# Patient Record
Sex: FEMALE | Race: WHITE | NOT HISPANIC OR LATINO
[De-identification: names, ages, dates, MRNs, and addresses within clinical notes are randomized per-mention and may not be internally consistent; named-entity substitution may affect disease eponyms.]

---

## 2017-05-26 ENCOUNTER — NON-APPOINTMENT (OUTPATIENT)
Age: 37
End: 2017-05-26

## 2017-05-26 ENCOUNTER — APPOINTMENT (OUTPATIENT)
Dept: CARDIOLOGY | Facility: CLINIC | Age: 37
End: 2017-05-26

## 2017-05-26 VITALS
OXYGEN SATURATION: 97 % | DIASTOLIC BLOOD PRESSURE: 69 MMHG | HEIGHT: 63 IN | BODY MASS INDEX: 19.31 KG/M2 | WEIGHT: 109 LBS | HEART RATE: 72 BPM | SYSTOLIC BLOOD PRESSURE: 110 MMHG

## 2017-05-26 DIAGNOSIS — Z00.00 ENCOUNTER FOR GENERAL ADULT MEDICAL EXAMINATION W/OUT ABNORMAL FINDINGS: ICD-10-CM

## 2017-05-26 DIAGNOSIS — R00.2 PALPITATIONS: ICD-10-CM

## 2017-12-21 ENCOUNTER — NON-APPOINTMENT (OUTPATIENT)
Age: 37
End: 2017-12-21

## 2017-12-21 ENCOUNTER — APPOINTMENT (OUTPATIENT)
Dept: CARDIOLOGY | Facility: CLINIC | Age: 37
End: 2017-12-21
Payer: COMMERCIAL

## 2017-12-21 VITALS
BODY MASS INDEX: 18.43 KG/M2 | WEIGHT: 104 LBS | HEIGHT: 63 IN | OXYGEN SATURATION: 97 % | SYSTOLIC BLOOD PRESSURE: 108 MMHG | HEART RATE: 77 BPM | RESPIRATION RATE: 15 BRPM | DIASTOLIC BLOOD PRESSURE: 73 MMHG

## 2017-12-21 DIAGNOSIS — R07.9 CHEST PAIN, UNSPECIFIED: ICD-10-CM

## 2017-12-21 PROCEDURE — 93000 ELECTROCARDIOGRAM COMPLETE: CPT

## 2017-12-21 PROCEDURE — 99204 OFFICE O/P NEW MOD 45 MIN: CPT

## 2018-01-25 ENCOUNTER — APPOINTMENT (OUTPATIENT)
Dept: CARDIOLOGY | Facility: CLINIC | Age: 38
End: 2018-01-25
Payer: COMMERCIAL

## 2018-01-25 ENCOUNTER — NON-APPOINTMENT (OUTPATIENT)
Age: 38
End: 2018-01-25

## 2018-01-25 VITALS
DIASTOLIC BLOOD PRESSURE: 78 MMHG | BODY MASS INDEX: 18.78 KG/M2 | SYSTOLIC BLOOD PRESSURE: 111 MMHG | WEIGHT: 106 LBS | HEIGHT: 63 IN | HEART RATE: 80 BPM | OXYGEN SATURATION: 97 % | RESPIRATION RATE: 15 BRPM

## 2018-01-25 PROCEDURE — 93306 TTE W/DOPPLER COMPLETE: CPT

## 2018-01-25 PROCEDURE — 99213 OFFICE O/P EST LOW 20 MIN: CPT

## 2018-01-25 PROCEDURE — 93000 ELECTROCARDIOGRAM COMPLETE: CPT

## 2018-01-26 ENCOUNTER — APPOINTMENT (OUTPATIENT)
Dept: CARDIOLOGY | Facility: CLINIC | Age: 38
End: 2018-01-26

## 2018-04-06 ENCOUNTER — OUTPATIENT (OUTPATIENT)
Dept: OUTPATIENT SERVICES | Facility: HOSPITAL | Age: 38
LOS: 1 days | End: 2018-04-06
Payer: COMMERCIAL

## 2018-04-06 VITALS
TEMPERATURE: 99 F | SYSTOLIC BLOOD PRESSURE: 105 MMHG | HEART RATE: 83 BPM | HEIGHT: 63 IN | RESPIRATION RATE: 18 BRPM | DIASTOLIC BLOOD PRESSURE: 73 MMHG | WEIGHT: 104.94 LBS | OXYGEN SATURATION: 98 %

## 2018-04-06 DIAGNOSIS — M67.40 GANGLION, UNSPECIFIED SITE: ICD-10-CM

## 2018-04-06 DIAGNOSIS — Z01.818 ENCOUNTER FOR OTHER PREPROCEDURAL EXAMINATION: ICD-10-CM

## 2018-04-06 PROCEDURE — G0463: CPT

## 2018-04-06 RX ORDER — SODIUM CHLORIDE 9 MG/ML
3 INJECTION INTRAMUSCULAR; INTRAVENOUS; SUBCUTANEOUS EVERY 8 HOURS
Qty: 0 | Refills: 0 | Status: DISCONTINUED | OUTPATIENT
Start: 2018-04-13 | End: 2018-04-28

## 2018-04-06 RX ORDER — ACETAMINOPHEN 500 MG
1000 TABLET ORAL ONCE
Qty: 0 | Refills: 0 | Status: COMPLETED | OUTPATIENT
Start: 2018-04-13 | End: 2018-04-13

## 2018-04-06 RX ORDER — LIDOCAINE HCL 20 MG/ML
0.2 VIAL (ML) INJECTION ONCE
Qty: 0 | Refills: 0 | Status: DISCONTINUED | OUTPATIENT
Start: 2018-04-13 | End: 2018-04-28

## 2018-04-06 NOTE — H&P PST ADULT - NSANTHOSAYNRD_GEN_A_CORE
No. IVY screening performed.  STOP BANG Legend: 0-2 = LOW Risk; 3-4 = INTERMEDIATE Risk; 5-8 = HIGH Risk

## 2018-04-13 ENCOUNTER — OUTPATIENT (OUTPATIENT)
Dept: OUTPATIENT SERVICES | Facility: HOSPITAL | Age: 38
LOS: 1 days | End: 2018-04-13
Payer: COMMERCIAL

## 2018-04-13 ENCOUNTER — RESULT REVIEW (OUTPATIENT)
Age: 38
End: 2018-04-13

## 2018-04-13 VITALS
OXYGEN SATURATION: 99 % | TEMPERATURE: 98 F | HEART RATE: 78 BPM | RESPIRATION RATE: 16 BRPM | SYSTOLIC BLOOD PRESSURE: 108 MMHG | DIASTOLIC BLOOD PRESSURE: 58 MMHG

## 2018-04-13 VITALS
TEMPERATURE: 98 F | HEIGHT: 63 IN | WEIGHT: 104.94 LBS | RESPIRATION RATE: 20 BRPM | OXYGEN SATURATION: 100 % | HEART RATE: 865 BPM | DIASTOLIC BLOOD PRESSURE: 78 MMHG | SYSTOLIC BLOOD PRESSURE: 111 MMHG

## 2018-04-13 DIAGNOSIS — M67.40 GANGLION, UNSPECIFIED SITE: ICD-10-CM

## 2018-04-13 PROCEDURE — 25111 REMOVE WRIST TENDON LESION: CPT | Mod: RT

## 2018-04-13 PROCEDURE — 88304 TISSUE EXAM BY PATHOLOGIST: CPT

## 2018-04-13 PROCEDURE — 88304 TISSUE EXAM BY PATHOLOGIST: CPT | Mod: 26

## 2018-04-13 RX ORDER — ONDANSETRON 8 MG/1
4 TABLET, FILM COATED ORAL ONCE
Qty: 0 | Refills: 0 | Status: DISCONTINUED | OUTPATIENT
Start: 2018-04-13 | End: 2018-04-28

## 2018-04-13 RX ORDER — CELECOXIB 200 MG/1
200 CAPSULE ORAL ONCE
Qty: 0 | Refills: 0 | Status: COMPLETED | OUTPATIENT
Start: 2018-04-13 | End: 2018-04-13

## 2018-04-13 RX ORDER — FAMOTIDINE 10 MG/ML
0 INJECTION INTRAVENOUS
Qty: 0 | Refills: 0 | COMMUNITY

## 2018-04-13 RX ORDER — CELECOXIB 200 MG/1
200 CAPSULE ORAL ONCE
Qty: 0 | Refills: 0 | Status: DISCONTINUED | OUTPATIENT
Start: 2018-04-13 | End: 2018-04-28

## 2018-04-13 RX ORDER — SODIUM CHLORIDE 9 MG/ML
1000 INJECTION, SOLUTION INTRAVENOUS
Qty: 0 | Refills: 0 | Status: DISCONTINUED | OUTPATIENT
Start: 2018-04-13 | End: 2018-04-28

## 2018-04-13 RX ADMIN — Medication 1000 MILLIGRAM(S): at 14:03

## 2018-04-13 RX ADMIN — CELECOXIB 200 MILLIGRAM(S): 200 CAPSULE ORAL at 14:04

## 2018-04-13 NOTE — ASU DISCHARGE PLAN (ADULT/PEDIATRIC). - NURSING INSTRUCTIONS
Tylenol/Motrin as needed for pain, OK @ 8:00pm.  Keep right wrist elevated as much as possible.  Follow up as per MD request.

## 2018-04-13 NOTE — ASU DISCHARGE PLAN (ADULT/PEDIATRIC). - SPECIAL INSTRUCTIONS
You may take over-the-counter pain medications for post-operative pain.      Keep you arm elevated when not in use.  This will help reduce swelling.  You may apply cold-packs to the wrist as well for pain/swelling relief.

## 2019-02-09 ENCOUNTER — APPOINTMENT (OUTPATIENT)
Dept: ORTHOPEDIC SURGERY | Facility: CLINIC | Age: 39
End: 2019-02-09
Payer: COMMERCIAL

## 2019-02-09 VITALS
HEIGHT: 63 IN | WEIGHT: 106 LBS | SYSTOLIC BLOOD PRESSURE: 111 MMHG | BODY MASS INDEX: 18.78 KG/M2 | DIASTOLIC BLOOD PRESSURE: 77 MMHG | HEART RATE: 78 BPM

## 2019-02-09 DIAGNOSIS — M54.5 LOW BACK PAIN: ICD-10-CM

## 2019-02-09 DIAGNOSIS — M51.36 OTHER INTERVERTEBRAL DISC DEGENERATION, LUMBAR REGION: ICD-10-CM

## 2019-02-09 PROCEDURE — 99214 OFFICE O/P EST MOD 30 MIN: CPT

## 2019-02-09 PROCEDURE — 72170 X-RAY EXAM OF PELVIS: CPT | Mod: 59

## 2019-02-09 PROCEDURE — 72110 X-RAY EXAM L-2 SPINE 4/>VWS: CPT

## 2019-02-09 NOTE — HISTORY OF PRESENT ILLNESS
[Pain Location] : pain [] : right buttock [Lumbar] : lumbar region [___ wks] : [unfilled] week(s) ago [0] : a current pain level of 0/10 [Bending] : worsened by bending [Lifting] : worsened by lifting [Prolonged Sitting] : worsened by prolonged sitting [Prolonged Standing] : worsened by prolonged standing [None] : No exacerbating factors are noted [NSAIDs] : relieved by nonsteroidal anti-inflammatory drugs [Rest] : relieved by rest [de-identified] : Patient was lifting heavy items and stretch over to redecorate the back splash and had a sudden onset of low back pain with no radiculopathy to the lower extremities. Patient has some mild weakness on the right buttock and right leg.\par Denies any buckling.\par Patient lies down and rests for several days and takes Aleve to help decrease the pain.

## 2019-02-09 NOTE — DISCUSSION/SUMMARY
[Medication Risks Reviewed] : Medication risks reviewed [Surgical risks reviewed] : Surgical risks reviewed [de-identified] : Physical therapy prescription provided and patient is to return in 4-6 weeks\par If PT helps alleviate the back pain to continue, if not will discuss of MRI of the lumbar spine.

## 2019-02-09 NOTE — PHYSICAL EXAM
[Antalgic] : antalgic [UE/LE] : Sensory: Intact in bilateral upper & lower extremities [ALL] : dorsalis pedis, posterior tibial, femoral, popliteal, and radial 2+ and symmetric bilaterally [Normal RLE] : Right Lower Extremity: No scars, rashes, lesions, ulcers, skin intact [Normal LLE] : Left Lower Extremity: No scars, rashes, lesions, ulcers, skin intact [Normal DTR Reflexes] : DTR reflexes normal [Normal Touch] : sensation intact for touch [Normal] : No costovertebral angle tenderness and no spinal tenderness [Poor Appearance] : well-appearing [Acute Distress] : not in acute distress [Obese] : not obese [Poor Coordination] : normal coordination [de-identified] : Negative Fabers test [de-identified] : Straight leg raise of the right leg demonstrates low back pain.\par Normal toe and heel walk [de-identified] : No palpable tenderness of the spine [de-identified] : Xrays 4 views of the lumbar spine with good alignment on AP view, Lateral view demonstrating a decreased disc space at L5-S1 and vertebral spurring at L5. No fractures. No instability with flexion and extension views. \par AP view of the pelvis with well preserved joint spaces of bilateral hips.

## 2019-03-11 ENCOUNTER — APPOINTMENT (OUTPATIENT)
Dept: ORTHOPEDIC SURGERY | Facility: CLINIC | Age: 39
End: 2019-03-11

## 2019-03-20 ENCOUNTER — RESULT REVIEW (OUTPATIENT)
Age: 39
End: 2019-03-20

## 2019-10-27 ENCOUNTER — TRANSCRIPTION ENCOUNTER (OUTPATIENT)
Age: 39
End: 2019-10-27

## 2020-01-16 ENCOUNTER — TRANSCRIPTION ENCOUNTER (OUTPATIENT)
Age: 40
End: 2020-01-16

## 2020-01-20 ENCOUNTER — TRANSCRIPTION ENCOUNTER (OUTPATIENT)
Age: 40
End: 2020-01-20

## 2020-01-29 ENCOUNTER — APPOINTMENT (OUTPATIENT)
Dept: SURGERY | Facility: CLINIC | Age: 40
End: 2020-01-29
Payer: COMMERCIAL

## 2020-01-29 VITALS — BODY MASS INDEX: 18.61 KG/M2 | HEIGHT: 63 IN | WEIGHT: 105 LBS

## 2020-01-29 DIAGNOSIS — Z87.09 PERSONAL HISTORY OF OTHER DISEASES OF THE RESPIRATORY SYSTEM: ICD-10-CM

## 2020-01-29 PROCEDURE — 99203 OFFICE O/P NEW LOW 30 MIN: CPT

## 2020-01-30 NOTE — HISTORY OF PRESENT ILLNESS
[de-identified] : This 39 year old woman developed a painful anal mass four days ago. The mass is no longer painful and is slightly smaller. She has a past history of a similar problem which occurs several times a year. The patient denies any bleeding or passage of pus from the area.

## 2020-01-30 NOTE — PHYSICAL EXAM
[Normal Breath Sounds] : Normal breath sounds [Normal Heart Sounds] : normal heart sounds [Normal Rate and Rhythm] : normal rate and rhythm [Abdomen Tenderness] : ~T ~M No abdominal tenderness [Abdominal Masses] : No abdominal masses [No Rash or Lesion] : No rash or lesion [de-identified] : nl [de-identified] : nl [de-identified] : resolving thrombosed external hemorrhoid - anterior - 6 o'clock [de-identified] : nl

## 2020-01-30 NOTE — ASSESSMENT
[FreeTextEntry1] : Long discussion all options and risks\par Advised ice on the area to speed the healing process\par  I assured her that this was a self-limiting process that did not require any specific treatment\par  We discussed the possibility of undergoing a hemorrhoidectomy in the future as this has been a frequent occurrence.

## 2020-01-30 NOTE — REVIEW OF SYSTEMS
[Chest Pain] : no chest pain [Heart Rate Is Slow] : the heart rate was not slow [Shortness Of Breath] : no shortness of breath [Abdominal Pain] : no abdominal pain [Constipation] : no constipation [Negative] : Genitourinary

## 2020-12-18 ENCOUNTER — TRANSCRIPTION ENCOUNTER (OUTPATIENT)
Age: 40
End: 2020-12-18

## 2021-03-21 ENCOUNTER — TRANSCRIPTION ENCOUNTER (OUTPATIENT)
Age: 41
End: 2021-03-21

## 2021-03-29 ENCOUNTER — TRANSCRIPTION ENCOUNTER (OUTPATIENT)
Age: 41
End: 2021-03-29

## 2022-01-13 NOTE — H&P PST ADULT - NSANTHTOTALSCORECAL_ENT_A_CORE
[NL] : regular rate and rhythm, normal S1, S2 audible, no murmurs [Normotonic] : normotonic [de-identified] : moist mucus membranes [de-identified] : diffuse eczematous patches on b/l antecubital fossa and behind knees, dry skin throughout. 0

## 2022-10-28 ENCOUNTER — OUTPATIENT (OUTPATIENT)
Dept: OUTPATIENT SERVICES | Facility: HOSPITAL | Age: 42
LOS: 1 days | End: 2022-10-28
Payer: COMMERCIAL

## 2022-10-28 ENCOUNTER — APPOINTMENT (OUTPATIENT)
Dept: CT IMAGING | Facility: CLINIC | Age: 42
End: 2022-10-28

## 2022-10-28 DIAGNOSIS — Z00.8 ENCOUNTER FOR OTHER GENERAL EXAMINATION: ICD-10-CM

## 2022-10-28 PROCEDURE — 70486 CT MAXILLOFACIAL W/O DYE: CPT

## 2022-10-28 PROCEDURE — 70486 CT MAXILLOFACIAL W/O DYE: CPT | Mod: 26

## 2022-11-27 ENCOUNTER — NON-APPOINTMENT (OUTPATIENT)
Age: 42
End: 2022-11-27

## 2022-12-06 ENCOUNTER — NON-APPOINTMENT (OUTPATIENT)
Age: 42
End: 2022-12-06

## 2022-12-06 ENCOUNTER — APPOINTMENT (OUTPATIENT)
Dept: GASTROENTEROLOGY | Facility: CLINIC | Age: 42
End: 2022-12-06

## 2022-12-06 VITALS
SYSTOLIC BLOOD PRESSURE: 109 MMHG | RESPIRATION RATE: 15 BRPM | DIASTOLIC BLOOD PRESSURE: 75 MMHG | TEMPERATURE: 98.5 F | WEIGHT: 113 LBS | OXYGEN SATURATION: 98 % | BODY MASS INDEX: 20.02 KG/M2 | HEART RATE: 80 BPM | HEIGHT: 63 IN

## 2022-12-06 DIAGNOSIS — R12 HEARTBURN: ICD-10-CM

## 2022-12-06 DIAGNOSIS — K21.9 GASTRO-ESOPHAGEAL REFLUX DISEASE W/OUT ESOPHAGITIS: ICD-10-CM

## 2022-12-06 PROCEDURE — 99203 OFFICE O/P NEW LOW 30 MIN: CPT

## 2022-12-06 RX ORDER — OMEPRAZOLE 40 MG/1
CAPSULE, DELAYED RELEASE ORAL
Refills: 0 | Status: COMPLETED | COMMUNITY
End: 2022-12-06

## 2022-12-06 RX ORDER — CAMPHOR 0.45 %
GEL (GRAM) TOPICAL
Refills: 0 | Status: COMPLETED | COMMUNITY
End: 2022-12-06

## 2022-12-06 RX ORDER — SODIUM SULFATE, POTASSIUM SULFATE AND MAGNESIUM SULFATE 1.6; 3.13; 17.5 G/177ML; G/177ML; G/177ML
17.5-3.13-1.6 SOLUTION ORAL
Qty: 1 | Refills: 0 | Status: COMPLETED | COMMUNITY
Start: 2022-12-06 | End: 2022-12-07

## 2022-12-06 RX ORDER — VALACYCLOVIR 500 MG/1
500 TABLET, FILM COATED ORAL
Qty: 30 | Refills: 0 | Status: COMPLETED | COMMUNITY
Start: 2018-09-08 | End: 2022-12-06

## 2022-12-06 NOTE — PHYSICAL EXAM
[Alert] : alert [Normal Voice/Communication] : normal voice/communication [Healthy Appearing] : healthy appearing [No Acute Distress] : no acute distress [Sclera] : the sclera and conjunctiva were normal [Hearing Threshold Finger Rub Not [place]] : hearing was normal [Normal Lips/Gums] : the lips and gums were normal [Oropharynx] : the oropharynx was normal [Normal Appearance] : the appearance of the neck was normal [No Neck Mass] : no neck mass was observed [No Respiratory Distress] : no respiratory distress [No Acc Muscle Use] : no accessory muscle use [Respiration, Rhythm And Depth] : normal respiratory rhythm and effort [Auscultation Breath Sounds / Voice Sounds] : lungs were clear to auscultation bilaterally [Heart Rate And Rhythm] : heart rate was normal and rhythm regular [Normal S1, S2] : normal S1 and S2 [Murmurs] : no murmurs [Bowel Sounds] : normal bowel sounds [Abdomen Tenderness] : non-tender [No Masses] : no abdominal mass palpated [Abdomen Soft] : soft [Oriented To Time, Place, And Person] : oriented to person, place, and time [No CVA Tenderness] : no CVA  tenderness [Involuntary Movements] : no involuntary movements were seen [Normal Color / Pigmentation] : normal skin color and pigmentation [] : no rash [Cranial Nerves Intact] : cranial nerves 2-12 were intact

## 2022-12-06 NOTE — REVIEW OF SYSTEMS
[As Noted in HPI] : as noted in HPI [Negative] : Heme/Lymph [FreeTextEntry7] : rectal bleeding/melena per HPI

## 2022-12-06 NOTE — ASSESSMENT
[FreeTextEntry1] : Check CBC to r/o anemia.\par \par Get colonoscopy.\par Explained the risks, benefits, indications, alternatives. The risks include but are not limited to bleeding, infection, perforation, reaction to anesthesia, or missed lesions. The patient verbalized understanding and wishes to proceed.\par \par Follow up after testing. She agrees.\par

## 2022-12-06 NOTE — HISTORY OF PRESENT ILLNESS
[FreeTextEntry1] : Rectal bleeding with urgency starting 9 days ago.\par Denies change in stools.\par Denies pain with BM at present.\par Denies weight loss or loss of appetite.\par Has had previous episodes attributed to hemorrhoids.\par Denies previous anoscopy, sigmoidoscopy, colonoscopy.\par \par No PMD or labs since before pandemic, per patient.

## 2022-12-07 ENCOUNTER — NON-APPOINTMENT (OUTPATIENT)
Age: 42
End: 2022-12-07

## 2022-12-07 LAB
BASOPHILS # BLD AUTO: 0.05 K/UL
BASOPHILS NFR BLD AUTO: 0.6 %
EOSINOPHIL # BLD AUTO: 0.12 K/UL
EOSINOPHIL NFR BLD AUTO: 1.5 %
HCT VFR BLD CALC: 34.6 %
HGB BLD-MCNC: 10.5 G/DL
IMM GRANULOCYTES NFR BLD AUTO: 0.4 %
LYMPHOCYTES # BLD AUTO: 1.69 K/UL
LYMPHOCYTES NFR BLD AUTO: 21.6 %
MAN DIFF?: NORMAL
MCHC RBC-ENTMCNC: 24.3 PG
MCHC RBC-ENTMCNC: 30.3 GM/DL
MCV RBC AUTO: 80.1 FL
MONOCYTES # BLD AUTO: 0.54 K/UL
MONOCYTES NFR BLD AUTO: 6.9 %
NEUTROPHILS # BLD AUTO: 5.39 K/UL
NEUTROPHILS NFR BLD AUTO: 69 %
PLATELET # BLD AUTO: 369 K/UL
RBC # BLD: 4.32 M/UL
RBC # FLD: 15.3 %
WBC # FLD AUTO: 7.82 K/UL

## 2023-02-02 ENCOUNTER — APPOINTMENT (OUTPATIENT)
Dept: GASTROENTEROLOGY | Facility: HOSPITAL | Age: 43
End: 2023-02-02

## 2023-03-01 ENCOUNTER — NON-APPOINTMENT (OUTPATIENT)
Age: 43
End: 2023-03-01

## 2023-03-02 ENCOUNTER — EMERGENCY (EMERGENCY)
Facility: HOSPITAL | Age: 43
LOS: 1 days | Discharge: HOME | End: 2023-03-02
Attending: EMERGENCY MEDICINE | Admitting: EMERGENCY MEDICINE
Payer: COMMERCIAL

## 2023-03-02 VITALS
SYSTOLIC BLOOD PRESSURE: 134 MMHG | TEMPERATURE: 98 F | OXYGEN SATURATION: 94 % | HEART RATE: 97 BPM | HEIGHT: 63 IN | WEIGHT: 104.94 LBS | DIASTOLIC BLOOD PRESSURE: 63 MMHG | RESPIRATION RATE: 25 BRPM

## 2023-03-02 VITALS
RESPIRATION RATE: 18 BRPM | DIASTOLIC BLOOD PRESSURE: 67 MMHG | OXYGEN SATURATION: 96 % | SYSTOLIC BLOOD PRESSURE: 100 MMHG | HEART RATE: 112 BPM | TEMPERATURE: 100 F

## 2023-03-02 LAB
ALBUMIN SERPL ELPH-MCNC: 3.4 G/DL — SIGNIFICANT CHANGE UP (ref 3.3–5)
ALP SERPL-CCNC: 96 U/L — SIGNIFICANT CHANGE UP (ref 40–120)
ALT FLD-CCNC: 14 U/L — SIGNIFICANT CHANGE UP (ref 10–45)
ANION GAP SERPL CALC-SCNC: 8 MMOL/L — SIGNIFICANT CHANGE UP (ref 5–17)
APPEARANCE UR: CLEAR — SIGNIFICANT CHANGE UP
AST SERPL-CCNC: 15 U/L — SIGNIFICANT CHANGE UP (ref 10–40)
BACTERIA # UR AUTO: ABNORMAL /HPF
BASOPHILS # BLD AUTO: 0.01 K/UL — SIGNIFICANT CHANGE UP (ref 0–0.2)
BASOPHILS NFR BLD AUTO: 0.1 % — SIGNIFICANT CHANGE UP (ref 0–2)
BILIRUB SERPL-MCNC: 0.6 MG/DL — SIGNIFICANT CHANGE UP (ref 0.2–1.2)
BILIRUB UR-MCNC: NEGATIVE — SIGNIFICANT CHANGE UP
BUN SERPL-MCNC: 9 MG/DL — SIGNIFICANT CHANGE UP (ref 7–23)
CALCIUM SERPL-MCNC: 8.9 MG/DL — SIGNIFICANT CHANGE UP (ref 8.4–10.5)
CHLORIDE SERPL-SCNC: 100 MMOL/L — SIGNIFICANT CHANGE UP (ref 96–108)
CO2 SERPL-SCNC: 27 MMOL/L — SIGNIFICANT CHANGE UP (ref 22–31)
COLOR SPEC: YELLOW — SIGNIFICANT CHANGE UP
CREAT SERPL-MCNC: 0.59 MG/DL — SIGNIFICANT CHANGE UP (ref 0.5–1.3)
DIFF PNL FLD: ABNORMAL
EGFR: 116 ML/MIN/1.73M2 — SIGNIFICANT CHANGE UP
EOSINOPHIL # BLD AUTO: 0.01 K/UL — SIGNIFICANT CHANGE UP (ref 0–0.5)
EOSINOPHIL NFR BLD AUTO: 0.1 % — SIGNIFICANT CHANGE UP (ref 0–6)
EPI CELLS # UR: SIGNIFICANT CHANGE UP
GLUCOSE SERPL-MCNC: 111 MG/DL — HIGH (ref 70–99)
GLUCOSE UR QL: NEGATIVE — SIGNIFICANT CHANGE UP
HCG SERPL-ACNC: <1 MIU/ML — SIGNIFICANT CHANGE UP
HCT VFR BLD CALC: 33.1 % — LOW (ref 34.5–45)
HGB BLD-MCNC: 10.3 G/DL — LOW (ref 11.5–15.5)
IMM GRANULOCYTES NFR BLD AUTO: 0.2 % — SIGNIFICANT CHANGE UP (ref 0–0.9)
KETONES UR-MCNC: ABNORMAL
LEUKOCYTE ESTERASE UR-ACNC: NEGATIVE — SIGNIFICANT CHANGE UP
LYMPHOCYTES # BLD AUTO: 0.42 K/UL — LOW (ref 1–3.3)
LYMPHOCYTES # BLD AUTO: 5.2 % — LOW (ref 13–44)
MCHC RBC-ENTMCNC: 23.4 PG — LOW (ref 27–34)
MCHC RBC-ENTMCNC: 31.1 GM/DL — LOW (ref 32–36)
MCV RBC AUTO: 75.2 FL — LOW (ref 80–100)
MONOCYTES # BLD AUTO: 0.07 K/UL — SIGNIFICANT CHANGE UP (ref 0–0.9)
MONOCYTES NFR BLD AUTO: 0.9 % — LOW (ref 2–14)
NEUTROPHILS # BLD AUTO: 7.48 K/UL — HIGH (ref 1.8–7.4)
NEUTROPHILS NFR BLD AUTO: 93.5 % — HIGH (ref 43–77)
NITRITE UR-MCNC: NEGATIVE — SIGNIFICANT CHANGE UP
NRBC # BLD: 0 /100 WBCS — SIGNIFICANT CHANGE UP (ref 0–0)
PH UR: 7 — SIGNIFICANT CHANGE UP (ref 5–8)
PLATELET # BLD AUTO: 312 K/UL — SIGNIFICANT CHANGE UP (ref 150–400)
POTASSIUM SERPL-MCNC: 3.8 MMOL/L — SIGNIFICANT CHANGE UP (ref 3.5–5.3)
POTASSIUM SERPL-SCNC: 3.8 MMOL/L — SIGNIFICANT CHANGE UP (ref 3.5–5.3)
PROT SERPL-MCNC: 6.8 G/DL — SIGNIFICANT CHANGE UP (ref 6–8.3)
PROT UR-MCNC: 15
RBC # BLD: 4.4 M/UL — SIGNIFICANT CHANGE UP (ref 3.8–5.2)
RBC # FLD: 15.2 % — HIGH (ref 10.3–14.5)
RBC CASTS # UR COMP ASSIST: SIGNIFICANT CHANGE UP /HPF (ref 0–4)
SODIUM SERPL-SCNC: 135 MMOL/L — SIGNIFICANT CHANGE UP (ref 135–145)
SP GR SPEC: 1 — LOW (ref 1.01–1.02)
UROBILINOGEN FLD QL: NEGATIVE — SIGNIFICANT CHANGE UP
WBC # BLD: 8.01 K/UL — SIGNIFICANT CHANGE UP (ref 3.8–10.5)
WBC # FLD AUTO: 8.01 K/UL — SIGNIFICANT CHANGE UP (ref 3.8–10.5)
WBC UR QL: NEGATIVE /HPF — SIGNIFICANT CHANGE UP (ref 0–5)

## 2023-03-02 PROCEDURE — 74177 CT ABD & PELVIS W/CONTRAST: CPT | Mod: MA

## 2023-03-02 PROCEDURE — 85025 COMPLETE CBC W/AUTO DIFF WBC: CPT

## 2023-03-02 PROCEDURE — 36415 COLL VENOUS BLD VENIPUNCTURE: CPT

## 2023-03-02 PROCEDURE — 76830 TRANSVAGINAL US NON-OB: CPT

## 2023-03-02 PROCEDURE — 96361 HYDRATE IV INFUSION ADD-ON: CPT

## 2023-03-02 PROCEDURE — 74177 CT ABD & PELVIS W/CONTRAST: CPT | Mod: 26,MA

## 2023-03-02 PROCEDURE — 99284 EMERGENCY DEPT VISIT MOD MDM: CPT | Mod: 25

## 2023-03-02 PROCEDURE — 99285 EMERGENCY DEPT VISIT HI MDM: CPT

## 2023-03-02 PROCEDURE — 76830 TRANSVAGINAL US NON-OB: CPT | Mod: 26

## 2023-03-02 PROCEDURE — 0225U NFCT DS DNA&RNA 21 SARSCOV2: CPT

## 2023-03-02 PROCEDURE — 84702 CHORIONIC GONADOTROPIN TEST: CPT

## 2023-03-02 PROCEDURE — 71250 CT THORAX DX C-: CPT | Mod: 26,MA

## 2023-03-02 PROCEDURE — 71250 CT THORAX DX C-: CPT | Mod: MA

## 2023-03-02 PROCEDURE — 80053 COMPREHEN METABOLIC PANEL: CPT

## 2023-03-02 PROCEDURE — 96374 THER/PROPH/DIAG INJ IV PUSH: CPT | Mod: XU

## 2023-03-02 PROCEDURE — 81001 URINALYSIS AUTO W/SCOPE: CPT

## 2023-03-02 RX ORDER — OXYCODONE AND ACETAMINOPHEN 5; 325 MG/1; MG/1
1 TABLET ORAL
Qty: 12 | Refills: 0
Start: 2023-03-02 | End: 2023-03-04

## 2023-03-02 RX ORDER — METHOCARBAMOL 500 MG/1
500 TABLET, FILM COATED ORAL ONCE
Refills: 0 | Status: COMPLETED | OUTPATIENT
Start: 2023-03-02 | End: 2023-03-02

## 2023-03-02 RX ORDER — SODIUM CHLORIDE 9 MG/ML
1000 INJECTION INTRAMUSCULAR; INTRAVENOUS; SUBCUTANEOUS ONCE
Refills: 0 | Status: COMPLETED | OUTPATIENT
Start: 2023-03-02 | End: 2023-03-02

## 2023-03-02 RX ORDER — KETOROLAC TROMETHAMINE 30 MG/ML
15 SYRINGE (ML) INJECTION ONCE
Refills: 0 | Status: DISCONTINUED | OUTPATIENT
Start: 2023-03-02 | End: 2023-03-02

## 2023-03-02 RX ORDER — LIDOCAINE 4 G/100G
1 CREAM TOPICAL ONCE
Refills: 0 | Status: COMPLETED | OUTPATIENT
Start: 2023-03-02 | End: 2023-03-02

## 2023-03-02 RX ORDER — METHOCARBAMOL 500 MG/1
1 TABLET, FILM COATED ORAL
Qty: 15 | Refills: 0
Start: 2023-03-02 | End: 2023-03-06

## 2023-03-02 RX ORDER — ACETAMINOPHEN 500 MG
650 TABLET ORAL ONCE
Refills: 0 | Status: COMPLETED | OUTPATIENT
Start: 2023-03-02 | End: 2023-03-02

## 2023-03-02 RX ORDER — IBUPROFEN 200 MG
1 TABLET ORAL
Qty: 28 | Refills: 0
Start: 2023-03-02 | End: 2023-03-08

## 2023-03-02 RX ADMIN — LIDOCAINE 1 PATCH: 4 CREAM TOPICAL at 13:40

## 2023-03-02 RX ADMIN — Medication 15 MILLIGRAM(S): at 13:42

## 2023-03-02 RX ADMIN — METHOCARBAMOL 500 MILLIGRAM(S): 500 TABLET, FILM COATED ORAL at 13:43

## 2023-03-02 RX ADMIN — SODIUM CHLORIDE 1000 MILLILITER(S): 9 INJECTION INTRAMUSCULAR; INTRAVENOUS; SUBCUTANEOUS at 14:42

## 2023-03-02 RX ADMIN — Medication 650 MILLIGRAM(S): at 20:22

## 2023-03-02 RX ADMIN — SODIUM CHLORIDE 2000 MILLILITER(S): 9 INJECTION INTRAMUSCULAR; INTRAVENOUS; SUBCUTANEOUS at 13:42

## 2023-03-02 RX ADMIN — Medication 15 MILLIGRAM(S): at 13:57

## 2023-03-02 NOTE — ED PROVIDER NOTE - PHYSICAL EXAMINATION
Gen: Well appearing in NAD.  AAOx3  Head: atraumatic  Abd: soft, +mild TTP over LLQ, no rebound or guarding, NCVAT  Msk: pelvis stable.  +TTP over the left inguinal region. limited ROM of left hip 2/2 pain.  Pain to the left inguinal region with SLR. No bruising, hematoma or rash noted. 2+ pedal pulses. No neurovasc compromise. compartments soft   Neuro: patient moving all extremity equally, no focal neuro deficits noted  Skin: Normal for race. No rashes or bruising   Psych: Alert and oriented

## 2023-03-02 NOTE — ED PROVIDER NOTE - CLINICAL SUMMARY MEDICAL DECISION MAKING FREE TEXT BOX
42-year-old female with past medical history of acid reflux was sent to the ED by urgent care for left hip pain X 1-1/2 to 2 months worsened today.  Patient reports she was on a ski trip about a month ago she thinks she aggravated the injury in her left hip.  She was doing stretching exercises today and felt the spasm in her hip got worse so she went to urgent care.  Patient reports that she was having chills from the pain in urgent care left eye they sent her in.  She denies fever, direct trauma, numbness or tingling down the leg, urinary or bowel incontinence continence, abdominal pain, nausea, vomiting or urinary symptoms. PE as noted above. labs/UA/CT pending. pain control, reassess.  Pt likely has psoas muscle injury. 42-year-old female with past medical history of acid reflux was sent to the ED by urgent care for left hip pain X 1-1/2 to 2 months worsened today.  Patient reports she was on a ski trip about a month ago she thinks she aggravated the injury in her left hip.  She was doing stretching exercises today and felt the spasm in her hip got worse so she went to urgent care.  Patient reports that she was having chills from the pain in urgent care left eye they sent her in.  She denies fever, direct trauma, numbness or tingling down the leg, urinary or bowel incontinence continence, abdominal pain, nausea, vomiting or urinary symptoms. PE as noted above. labs/UA/CT pending. pain control, reassess.  Pt likely has psoas muscle injury.    732pm: labs reviewed. UA reviewed. US/Abd CT and chest CT results reviewed. I spoke with Dr. Donaldson from [facility], he will see patient on Monday morning, he plans patient to call the office tomorrow for an appointment time slot. I discussed with patient and her mother at bedside. They verbalized understanding of the plan

## 2023-03-02 NOTE — ED PROVIDER NOTE - OBJECTIVE STATEMENT
42-year-old female with past medical history of acid reflux was sent to the ED by urgent care for left hip pain X 1-1/2 to 2 months worsened today.  Patient reports she was on a ski trip about a month ago she thinks she aggravated the injury in her left hip. She was doing stretching exercises today and felt the spasm in her hip got worse so she went to urgent care.  Patient reports that she was having chills from the pain in urgent care left eye they sent her in.  She denies fever, direct trauma, numbness or tingling down the leg, urinary or bowel incontinence continence, abdominal pain, nausea, vomiting or urinary symptoms

## 2023-03-02 NOTE — ED ADULT TRIAGE NOTE - CHIEF COMPLAINT QUOTE
c/o left hip pain with spasms since this morning. pt reports she went skiing a few days ago and pain has worsened since injury in jan. Reports she took an aspirin at 10 am.

## 2023-03-02 NOTE — ED PROVIDER NOTE - PATIENT PORTAL LINK FT
You can access the FollowMyHealth Patient Portal offered by [facility] by registering at the following website: [email or web address]. By joining [employer]’s FollowMyHealth portal, you will also be able to view your health information using other applications (apps) compatible with our system.

## 2023-03-02 NOTE — ED PROVIDER NOTE - NSFOLLOWUPINSTRUCTIONS_ED_ALL_ED_FT
Follow up with The gynecologist Dr. Donaldson on Monday.  Call his office tomorrow morning for an appointment time slot for Monday    Take the prescribed medications as directed for pain     Stay hydrated    Return to the ER if your symptoms worsen or for any other medical emergencies   *************

## 2023-03-02 NOTE — ED ADULT NURSE NOTE - OBJECTIVE STATEMENT
pt c/o L-groin pain rated 10/10 worsening since injury while skiing 1month ago. reports she was stretching this morning and got worse.

## 2023-03-02 NOTE — ED ADULT NURSE NOTE - NS PRO AD BILL OF RIGHTS
I can refer him back to dietician but he can't read and has poor focus - so if he doesn't remember what was said - he can't read handouts later. He really needs someone to shop with him and instructions in photos - but he has even had trouble following photos on TV dinners. It is rather frustrating. Germán Maciel was not hopeful for him the last time she saw him. I discussed with him at last visit - moving into assisted living and having them make food for him and he won't. Ren Baldwin was doing a really good job keeping him on track and shopping with him but now that he is out of state, patient is worsening. He is not taking medication from [facility] and I sent him back to [facility] to get meds restarted to help him sleep, focus, and treat irritability. Carin - order referral to karely Muniz.
Yes

## 2023-03-02 NOTE — ED PROVIDER NOTE - CARE PROVIDER_API CALL
Oscar Donaldson)  Obstetrics and Gynecology  [address], [address]  [place] [address]  Phone: [phone]  Fax: [phone]  Follow Up Time:

## 2023-03-02 NOTE — ED ADULT NURSE NOTE - NSICDXPASTSURGICALHX_GEN_ALL_CORE_FT
Bed rails are up and call light is within patient reach.   PAST SURGICAL HISTORY:  No significant past surgical history

## 2023-03-02 NOTE — ED PROVIDER NOTE - ATTENDING APP SHARED VISIT CONTRIBUTION OF CARE
Dylan with [facility]. 42-year-old female with past medical history of acid reflux was sent to the ED by urgent care for left hip pain X 1-1/2 to 2 months worsened today.  Patient reports she was on a ski trip about a month ago she thinks she aggravated the injury in her left hip.  She was doing stretching exercises today and felt the spasm in her hip got worse so she went to urgent care.  Patient reports that she was having chills from the pain in urgent care left eye they sent her in.  She denies fever, direct trauma, numbness or tingling down the leg, urinary or bowel incontinence continence, abdominal pain, nausea, vomiting or urinary symptoms. PE as noted above. labs/UA/CT pending. pain control, reassess.  Pt likely has psoas muscle injury. Will reassess      I performed a face to face bedside interview with patient regarding history of present illness, review of symptoms and past medical history. I completed an independent physical exam.  I have discussed the patient's plan of care with Physician Assistant (PA). I agree with note as stated above, having amended the EMR as needed to reflect my findings.   This includes History of Present Illness, HIV, Past Medical/Surgical/Family/Social History, Allergies and Home Medications, Review of Systems, Physical Exam, and any Progress Notes during the time I functioned as the attending physician for this patient.

## 2023-03-03 ENCOUNTER — INPATIENT (INPATIENT)
Facility: HOSPITAL | Age: 43
LOS: 5 days | Discharge: HOME HEALTH | DRG: 872 | End: 2023-03-09
Attending: OBSTETRICS & GYNECOLOGY | Admitting: OBSTETRICS & GYNECOLOGY
Payer: COMMERCIAL

## 2023-03-03 VITALS
DIASTOLIC BLOOD PRESSURE: 67 MMHG | WEIGHT: 100.09 LBS | TEMPERATURE: 98 F | OXYGEN SATURATION: 98 % | RESPIRATION RATE: 18 BRPM | SYSTOLIC BLOOD PRESSURE: 100 MMHG | HEART RATE: 124 BPM | HEIGHT: 63 IN

## 2023-03-03 DIAGNOSIS — N83.209 UNSPECIFIED OVARIAN CYST, UNSPECIFIED SIDE: ICD-10-CM

## 2023-03-03 LAB
ALBUMIN SERPL ELPH-MCNC: 3 G/DL — LOW (ref 3.3–5)
ALBUMIN SERPL ELPH-MCNC: 3.6 G/DL — SIGNIFICANT CHANGE UP (ref 3.3–5)
ALP SERPL-CCNC: 80 U/L — SIGNIFICANT CHANGE UP (ref 40–120)
ALP SERPL-CCNC: 84 U/L — SIGNIFICANT CHANGE UP (ref 40–120)
ALT FLD-CCNC: 11 U/L — SIGNIFICANT CHANGE UP (ref 10–45)
ALT FLD-CCNC: 11 U/L — SIGNIFICANT CHANGE UP (ref 10–45)
ANION GAP SERPL CALC-SCNC: 11 MMOL/L — SIGNIFICANT CHANGE UP (ref 5–17)
ANION GAP SERPL CALC-SCNC: 13 MMOL/L — SIGNIFICANT CHANGE UP (ref 5–17)
APTT BLD: 33.8 SEC — SIGNIFICANT CHANGE UP (ref 27.5–35.5)
APTT BLD: 33.9 SEC — SIGNIFICANT CHANGE UP (ref 27.5–35.5)
AST SERPL-CCNC: 10 U/L — SIGNIFICANT CHANGE UP (ref 10–40)
AST SERPL-CCNC: 10 U/L — SIGNIFICANT CHANGE UP (ref 10–40)
BASE EXCESS BLDV CALC-SCNC: -0.7 MMOL/L — SIGNIFICANT CHANGE UP (ref -2–3)
BASOPHILS # BLD AUTO: 0.42 K/UL — HIGH (ref 0–0.2)
BASOPHILS NFR BLD AUTO: 2.4 % — HIGH (ref 0–2)
BILIRUB SERPL-MCNC: 0.5 MG/DL — SIGNIFICANT CHANGE UP (ref 0.2–1.2)
BILIRUB SERPL-MCNC: 0.7 MG/DL — SIGNIFICANT CHANGE UP (ref 0.2–1.2)
BLD GP AB SCN SERPL QL: NEGATIVE — SIGNIFICANT CHANGE UP
BUN SERPL-MCNC: 10 MG/DL — SIGNIFICANT CHANGE UP (ref 7–23)
BUN SERPL-MCNC: 13 MG/DL — SIGNIFICANT CHANGE UP (ref 7–23)
CA-I SERPL-SCNC: 1.2 MMOL/L — SIGNIFICANT CHANGE UP (ref 1.15–1.33)
CALCIUM SERPL-MCNC: 8.4 MG/DL — SIGNIFICANT CHANGE UP (ref 8.4–10.5)
CALCIUM SERPL-MCNC: 9 MG/DL — SIGNIFICANT CHANGE UP (ref 8.4–10.5)
CHLORIDE BLDV-SCNC: 100 MMOL/L — SIGNIFICANT CHANGE UP (ref 96–108)
CHLORIDE SERPL-SCNC: 106 MMOL/L — SIGNIFICANT CHANGE UP (ref 96–108)
CHLORIDE SERPL-SCNC: 98 MMOL/L — SIGNIFICANT CHANGE UP (ref 96–108)
CO2 BLDV-SCNC: 25 MMOL/L — SIGNIFICANT CHANGE UP (ref 22–26)
CO2 SERPL-SCNC: 20 MMOL/L — LOW (ref 22–31)
CO2 SERPL-SCNC: 23 MMOL/L — SIGNIFICANT CHANGE UP (ref 22–31)
CREAT SERPL-MCNC: 0.51 MG/DL — SIGNIFICANT CHANGE UP (ref 0.5–1.3)
CREAT SERPL-MCNC: 0.63 MG/DL — SIGNIFICANT CHANGE UP (ref 0.5–1.3)
EGFR: 114 ML/MIN/1.73M2 — SIGNIFICANT CHANGE UP
EGFR: 120 ML/MIN/1.73M2 — SIGNIFICANT CHANGE UP
ELLIPTOCYTES BLD QL SMEAR: SIGNIFICANT CHANGE UP
EOSINOPHIL # BLD AUTO: 0 K/UL — SIGNIFICANT CHANGE UP (ref 0–0.5)
EOSINOPHIL NFR BLD AUTO: 0 % — SIGNIFICANT CHANGE UP (ref 0–6)
GAS PNL BLDV: 132 MMOL/L — LOW (ref 136–145)
GAS PNL BLDV: SIGNIFICANT CHANGE UP
GAS PNL BLDV: SIGNIFICANT CHANGE UP
GLUCOSE BLDV-MCNC: 114 MG/DL — HIGH (ref 70–99)
GLUCOSE SERPL-MCNC: 109 MG/DL — HIGH (ref 70–99)
GLUCOSE SERPL-MCNC: 121 MG/DL — HIGH (ref 70–99)
HCG SERPL-ACNC: <2 MIU/ML — SIGNIFICANT CHANGE UP
HCO3 BLDV-SCNC: 24 MMOL/L — SIGNIFICANT CHANGE UP (ref 22–29)
HCT VFR BLD CALC: 27.5 % — LOW (ref 34.5–45)
HCT VFR BLD CALC: 29.7 % — LOW (ref 34.5–45)
HCT VFR BLDA CALC: 27 % — LOW (ref 34.5–46.5)
HGB BLD CALC-MCNC: 9.1 G/DL — LOW (ref 11.7–16.1)
HGB BLD-MCNC: 8.6 G/DL — LOW (ref 11.5–15.5)
HGB BLD-MCNC: 9.2 G/DL — LOW (ref 11.5–15.5)
HIV 1 & 2 AB SERPL IA.RAPID: SIGNIFICANT CHANGE UP
INR BLD: 2.07 RATIO — HIGH (ref 0.88–1.16)
INR BLD: 2.29 RATIO — HIGH (ref 0.88–1.16)
LACTATE BLDV-MCNC: 1.6 MMOL/L — SIGNIFICANT CHANGE UP (ref 0.5–2)
LIDOCAIN IGE QN: 13 U/L — SIGNIFICANT CHANGE UP (ref 7–60)
LYMPHOCYTES # BLD AUTO: 30.9 % — SIGNIFICANT CHANGE UP (ref 13–44)
LYMPHOCYTES # BLD AUTO: 5.43 K/UL — HIGH (ref 1–3.3)
MAGNESIUM SERPL-MCNC: 1.7 MG/DL — SIGNIFICANT CHANGE UP (ref 1.6–2.6)
MANUAL SMEAR VERIFICATION: SIGNIFICANT CHANGE UP
MCHC RBC-ENTMCNC: 23.1 PG — LOW (ref 27–34)
MCHC RBC-ENTMCNC: 23.2 PG — LOW (ref 27–34)
MCHC RBC-ENTMCNC: 31 GM/DL — LOW (ref 32–36)
MCHC RBC-ENTMCNC: 31.3 GM/DL — LOW (ref 32–36)
MCV RBC AUTO: 74.3 FL — LOW (ref 80–100)
MCV RBC AUTO: 74.4 FL — LOW (ref 80–100)
METAMYELOCYTES # FLD: 2.4 % — HIGH (ref 0–0)
MONOCYTES # BLD AUTO: 0.84 K/UL — SIGNIFICANT CHANGE UP (ref 0–0.9)
MONOCYTES NFR BLD AUTO: 4.8 % — SIGNIFICANT CHANGE UP (ref 2–14)
NEUTROPHILS # BLD AUTO: 10.46 K/UL — HIGH (ref 1.8–7.4)
NEUTROPHILS NFR BLD AUTO: 50 % — SIGNIFICANT CHANGE UP (ref 43–77)
NEUTS BAND # BLD: 9.5 % — HIGH (ref 0–8)
NRBC # BLD: 0 /100 WBCS — SIGNIFICANT CHANGE UP (ref 0–0)
PCO2 BLDV: 40 MMHG — SIGNIFICANT CHANGE UP (ref 39–42)
PH BLDV: 7.39 — SIGNIFICANT CHANGE UP (ref 7.32–7.43)
PHOSPHATE SERPL-MCNC: 1.5 MG/DL — LOW (ref 2.5–4.5)
PLAT MORPH BLD: NORMAL — SIGNIFICANT CHANGE UP
PLATELET # BLD AUTO: 260 K/UL — SIGNIFICANT CHANGE UP (ref 150–400)
PLATELET # BLD AUTO: 317 K/UL — SIGNIFICANT CHANGE UP (ref 150–400)
PO2 BLDV: 49 MMHG — HIGH (ref 25–45)
POIKILOCYTOSIS BLD QL AUTO: SIGNIFICANT CHANGE UP
POTASSIUM BLDV-SCNC: 3.3 MMOL/L — LOW (ref 3.5–5.1)
POTASSIUM SERPL-MCNC: 3.4 MMOL/L — LOW (ref 3.5–5.3)
POTASSIUM SERPL-MCNC: 3.5 MMOL/L — SIGNIFICANT CHANGE UP (ref 3.5–5.3)
POTASSIUM SERPL-SCNC: 3.4 MMOL/L — LOW (ref 3.5–5.3)
POTASSIUM SERPL-SCNC: 3.5 MMOL/L — SIGNIFICANT CHANGE UP (ref 3.5–5.3)
PROCALCITONIN SERPL-MCNC: 16.61 NG/ML — HIGH (ref 0.02–0.1)
PROT SERPL-MCNC: 6.1 G/DL — SIGNIFICANT CHANGE UP (ref 6–8.3)
PROT SERPL-MCNC: 6.6 G/DL — SIGNIFICANT CHANGE UP (ref 6–8.3)
PROTHROM AB SERPL-ACNC: 24.2 SEC — HIGH (ref 10.5–13.4)
PROTHROM AB SERPL-ACNC: 26.8 SEC — HIGH (ref 10.5–13.4)
RAPID RVP RESULT: SIGNIFICANT CHANGE UP
RBC # BLD: 3.7 M/UL — LOW (ref 3.8–5.2)
RBC # BLD: 3.99 M/UL — SIGNIFICANT CHANGE UP (ref 3.8–5.2)
RBC # FLD: 15.8 % — HIGH (ref 10.3–14.5)
RBC # FLD: 15.8 % — HIGH (ref 10.3–14.5)
RBC BLD AUTO: ABNORMAL
RH IG SCN BLD-IMP: POSITIVE — SIGNIFICANT CHANGE UP
SAO2 % BLDV: 81.4 % — SIGNIFICANT CHANGE UP (ref 67–88)
SARS-COV-2 RNA SPEC QL NAA+PROBE: SIGNIFICANT CHANGE UP
SODIUM SERPL-SCNC: 134 MMOL/L — LOW (ref 135–145)
SODIUM SERPL-SCNC: 137 MMOL/L — SIGNIFICANT CHANGE UP (ref 135–145)
T PALLIDUM AB TITR SER: NEGATIVE — SIGNIFICANT CHANGE UP
WBC # BLD: 14.94 K/UL — HIGH (ref 3.8–10.5)
WBC # BLD: 17.58 K/UL — HIGH (ref 3.8–10.5)
WBC # FLD AUTO: 14.94 K/UL — HIGH (ref 3.8–10.5)
WBC # FLD AUTO: 17.58 K/UL — HIGH (ref 3.8–10.5)

## 2023-03-03 PROCEDURE — 93975 VASCULAR STUDY: CPT | Mod: 26

## 2023-03-03 PROCEDURE — 76830 TRANSVAGINAL US NON-OB: CPT | Mod: 26

## 2023-03-03 PROCEDURE — 99285 EMERGENCY DEPT VISIT HI MDM: CPT

## 2023-03-03 PROCEDURE — 99222 1ST HOSP IP/OBS MODERATE 55: CPT

## 2023-03-03 PROCEDURE — 74183 MRI ABD W/O CNTR FLWD CNTR: CPT | Mod: 26

## 2023-03-03 PROCEDURE — 72197 MRI PELVIS W/O & W/DYE: CPT | Mod: 26

## 2023-03-03 RX ORDER — OXYCODONE HYDROCHLORIDE 5 MG/1
5 TABLET ORAL EVERY 4 HOURS
Refills: 0 | Status: DISCONTINUED | OUTPATIENT
Start: 2023-03-03 | End: 2023-03-04

## 2023-03-03 RX ORDER — METRONIDAZOLE 500 MG
500 TABLET ORAL ONCE
Refills: 0 | Status: COMPLETED | OUTPATIENT
Start: 2023-03-03 | End: 2023-03-03

## 2023-03-03 RX ORDER — SODIUM CHLORIDE 9 MG/ML
1000 INJECTION, SOLUTION INTRAVENOUS
Refills: 0 | Status: DISCONTINUED | OUTPATIENT
Start: 2023-03-03 | End: 2023-03-05

## 2023-03-03 RX ORDER — CHLORHEXIDINE GLUCONATE 213 G/1000ML
1 SOLUTION TOPICAL DAILY
Refills: 0 | Status: DISCONTINUED | OUTPATIENT
Start: 2023-03-03 | End: 2023-03-08

## 2023-03-03 RX ORDER — CEFOTETAN DISODIUM 1 G
2 VIAL (EA) INJECTION EVERY 12 HOURS
Refills: 0 | Status: DISCONTINUED | OUTPATIENT
Start: 2023-03-03 | End: 2023-03-08

## 2023-03-03 RX ORDER — MAGNESIUM SULFATE 500 MG/ML
2 VIAL (ML) INJECTION ONCE
Refills: 0 | Status: COMPLETED | OUTPATIENT
Start: 2023-03-03 | End: 2023-03-03

## 2023-03-03 RX ORDER — SODIUM CHLORIDE 9 MG/ML
1000 INJECTION INTRAMUSCULAR; INTRAVENOUS; SUBCUTANEOUS ONCE
Refills: 0 | Status: COMPLETED | OUTPATIENT
Start: 2023-03-03 | End: 2023-03-03

## 2023-03-03 RX ORDER — HYDROMORPHONE HYDROCHLORIDE 2 MG/ML
0.25 INJECTION INTRAMUSCULAR; INTRAVENOUS; SUBCUTANEOUS
Refills: 0 | Status: DISCONTINUED | OUTPATIENT
Start: 2023-03-03 | End: 2023-03-03

## 2023-03-03 RX ORDER — METRONIDAZOLE 500 MG
500 TABLET ORAL EVERY 8 HOURS
Refills: 0 | Status: DISCONTINUED | OUTPATIENT
Start: 2023-03-03 | End: 2023-03-08

## 2023-03-03 RX ORDER — ACETAMINOPHEN 500 MG
750 TABLET ORAL EVERY 6 HOURS
Refills: 0 | Status: COMPLETED | OUTPATIENT
Start: 2023-03-03 | End: 2023-03-04

## 2023-03-03 RX ORDER — ACETAMINOPHEN 500 MG
675 TABLET ORAL ONCE
Refills: 0 | Status: COMPLETED | OUTPATIENT
Start: 2023-03-03 | End: 2023-03-03

## 2023-03-03 RX ORDER — HYDROMORPHONE HYDROCHLORIDE 2 MG/ML
0.5 INJECTION INTRAMUSCULAR; INTRAVENOUS; SUBCUTANEOUS ONCE
Refills: 0 | Status: DISCONTINUED | OUTPATIENT
Start: 2023-03-03 | End: 2023-03-03

## 2023-03-03 RX ORDER — POTASSIUM CHLORIDE 20 MEQ
40 PACKET (EA) ORAL ONCE
Refills: 0 | Status: COMPLETED | OUTPATIENT
Start: 2023-03-03 | End: 2023-03-03

## 2023-03-03 RX ORDER — POTASSIUM PHOSPHATE, MONOBASIC POTASSIUM PHOSPHATE, DIBASIC 236; 224 MG/ML; MG/ML
15 INJECTION, SOLUTION INTRAVENOUS ONCE
Refills: 0 | Status: COMPLETED | OUTPATIENT
Start: 2023-03-03 | End: 2023-03-03

## 2023-03-03 RX ORDER — HYDROMORPHONE HYDROCHLORIDE 2 MG/ML
0.25 INJECTION INTRAMUSCULAR; INTRAVENOUS; SUBCUTANEOUS
Refills: 0 | Status: DISCONTINUED | OUTPATIENT
Start: 2023-03-03 | End: 2023-03-05

## 2023-03-03 RX ORDER — CEFOTETAN DISODIUM 1 G
2 VIAL (EA) INJECTION ONCE
Refills: 0 | Status: COMPLETED | OUTPATIENT
Start: 2023-03-03 | End: 2023-03-03

## 2023-03-03 RX ORDER — OXYCODONE HYDROCHLORIDE 5 MG/1
2.5 TABLET ORAL EVERY 4 HOURS
Refills: 0 | Status: DISCONTINUED | OUTPATIENT
Start: 2023-03-03 | End: 2023-03-04

## 2023-03-03 RX ADMIN — Medication 270 MILLIGRAM(S): at 13:10

## 2023-03-03 RX ADMIN — Medication 100 MILLIGRAM(S): at 15:33

## 2023-03-03 RX ADMIN — SODIUM CHLORIDE 1000 MILLILITER(S): 9 INJECTION INTRAMUSCULAR; INTRAVENOUS; SUBCUTANEOUS at 11:40

## 2023-03-03 RX ADMIN — Medication 40 MILLIEQUIVALENT(S): at 21:13

## 2023-03-03 RX ADMIN — Medication 300 MILLIGRAM(S): at 23:38

## 2023-03-03 RX ADMIN — Medication 100 MILLIGRAM(S): at 16:41

## 2023-03-03 RX ADMIN — HYDROMORPHONE HYDROCHLORIDE 0.25 MILLIGRAM(S): 2 INJECTION INTRAMUSCULAR; INTRAVENOUS; SUBCUTANEOUS at 18:06

## 2023-03-03 RX ADMIN — POTASSIUM PHOSPHATE, MONOBASIC POTASSIUM PHOSPHATE, DIBASIC 62.5 MILLIMOLE(S): 236; 224 INJECTION, SOLUTION INTRAVENOUS at 21:13

## 2023-03-03 RX ADMIN — HYDROMORPHONE HYDROCHLORIDE 0.5 MILLIGRAM(S): 2 INJECTION INTRAMUSCULAR; INTRAVENOUS; SUBCUTANEOUS at 23:26

## 2023-03-03 RX ADMIN — Medication 100 GRAM(S): at 18:48

## 2023-03-03 RX ADMIN — HYDROMORPHONE HYDROCHLORIDE 0.25 MILLIGRAM(S): 2 INJECTION INTRAMUSCULAR; INTRAVENOUS; SUBCUTANEOUS at 21:20

## 2023-03-03 RX ADMIN — Medication 100 MILLIGRAM(S): at 21:05

## 2023-03-03 RX ADMIN — Medication 25 GRAM(S): at 21:28

## 2023-03-03 RX ADMIN — SODIUM CHLORIDE 100 MILLILITER(S): 9 INJECTION, SOLUTION INTRAVENOUS at 18:06

## 2023-03-03 RX ADMIN — Medication 675 MILLIGRAM(S): at 14:37

## 2023-03-03 RX ADMIN — Medication 100 GRAM(S): at 13:51

## 2023-03-03 RX ADMIN — HYDROMORPHONE HYDROCHLORIDE 0.5 MILLIGRAM(S): 2 INJECTION INTRAMUSCULAR; INTRAVENOUS; SUBCUTANEOUS at 23:41

## 2023-03-03 RX ADMIN — HYDROMORPHONE HYDROCHLORIDE 0.25 MILLIGRAM(S): 2 INJECTION INTRAMUSCULAR; INTRAVENOUS; SUBCUTANEOUS at 18:30

## 2023-03-03 RX ADMIN — Medication 100 MILLIGRAM(S): at 19:47

## 2023-03-03 RX ADMIN — Medication 675 MILLIGRAM(S): at 14:50

## 2023-03-03 RX ADMIN — SODIUM CHLORIDE 1000 MILLILITER(S): 9 INJECTION INTRAMUSCULAR; INTRAVENOUS; SUBCUTANEOUS at 13:00

## 2023-03-03 RX ADMIN — SODIUM CHLORIDE 1000 MILLILITER(S): 9 INJECTION INTRAMUSCULAR; INTRAVENOUS; SUBCUTANEOUS at 14:50

## 2023-03-03 RX ADMIN — SODIUM CHLORIDE 100 MILLILITER(S): 9 INJECTION, SOLUTION INTRAVENOUS at 19:49

## 2023-03-03 RX ADMIN — SODIUM CHLORIDE 100 MILLILITER(S): 9 INJECTION, SOLUTION INTRAVENOUS at 23:31

## 2023-03-03 RX ADMIN — HYDROMORPHONE HYDROCHLORIDE 0.25 MILLIGRAM(S): 2 INJECTION INTRAMUSCULAR; INTRAVENOUS; SUBCUTANEOUS at 21:05

## 2023-03-03 NOTE — CONSULT NOTE ADULT - SUBJECTIVE AND OBJECTIVE BOX
Vascular & Interventional Radiology    HPI: 42y Female with _______    Data:  T(C): 36.9  HR: 114  BP: 94/59  RR: 37  SpO2: 98%    -WBC 17.58 / HgB 9.2 / Hct 29.7 / Plt 317  -Na 134 / Cl 98 / BUN 13 / Glucose 121  -K 3.4 / CO2 23 / Cr 0.63  -ALT 11 / Alk Phos 84 / T.Bili 0.7  -INR2.29    Assessment:   42y Female with_______    Plan:  Vascular & Interventional Radiology    HPI: 41yo G0 LMP 3/1 presented w/ acute worsening of LLQ, tvus w/  enlarged left ovary with complex cystic lesion, likely a hemorrhagic cyst vs ovarian cystic neoplasm cannot be excluded. VS significant for hypotension and tachycardia, leukocytosis to 17. Clinically patient appears septic with possible TOA, IR consulted for drainage.     Data:  T(C): 36.9  HR: 114  BP: 94/59  RR: 37  SpO2: 98%    -WBC 17.58 / HgB 9.2 / Hct 29.7 / Plt 317  -Na 134 / Cl 98 / BUN 13 / Glucose 121  -K 3.4 / CO2 23 / Cr 0.63  -ALT 11 / Alk Phos 84 / T.Bili 0.7  -INR2.29    Assessment: : 41yo G0 LMP 3/1 presented w/ acute worsening of LLQ, tvus w/  enlarged left ovary with complex cystic lesion, likely a hemorrhagic cyst vs ovarian cystic neoplasm cannot be excluded. VS significant for hypotension and tachycardia, leukocytosis to 17. Clinically patient appears septic with possible TOA, IR consulted for drainage.        Plan: Please obtain MRI to r/o abscess.  Vascular & Interventional Radiology    HPI: 41yo G0 LMP 3/1 presented w/ acute worsening of LLQ, tvus w/  enlarged left ovary with complex cystic lesion, likely a hemorrhagic cyst vs ovarian cystic neoplasm cannot be excluded. VS significant for hypotension and tachycardia, leukocytosis to 17. Clinically patient appears septic with possible TOA, IR consulted for drainage.     Data:  T(C): 36.9  HR: 114  BP: 94/59  RR: 37  SpO2: 98%    -WBC 17.58 / HgB 9.2 / Hct 29.7 / Plt 317  -Na 134 / Cl 98 / BUN 13 / Glucose 121  -K 3.4 / CO2 23 / Cr 0.63  -ALT 11 / Alk Phos 84 / T.Bili 0.7  -INR2.29    Assessment: : 41yo G0 LMP 3/1 presented w/ acute worsening of LLQ, tvus w/ enlarged left ovary with complex cystic lesion, likely a hemorrhagic cyst vs ovarian cystic neoplasm cannot be excluded. VS significant for hypotension and tachycardia, leukocytosis to 17. Clinically patient appears septic with possible TOA, IR consulted for drainage.     Plan:   - Please obtain MRI to better characterize lesion and r/o malignancy.  - Would need to evaluate risk/benefit of drainage given ovarian vascularity and possible dysfunction from drain placement.  - Continue conservative management/IV abx for now.  - Trend labs. WBC decreasing.  - Page needed.      --  Gustavo Zavala MD, PGY-6  Vascular and Interventional Radiology  Available on [employer] Teams    - Nonemergent consults:  [facility] order "Consult- Interventional Radiology"  - Emergent issues (pager): [facility] [phone]; [facility] [phone]; [phone]  - Scheduling questions: [facility] [phone]; [facility] [phone]  - Clinic/outpatient booking: [facility] [phone]; [facility] [phone]

## 2023-03-03 NOTE — H&P ADULT - HISTORY OF PRESENT ILLNESS
MOLINA BAKER  42y  Female [id number]    HPI: 43yo G0 LMP 3/1 presented w/ acute worsening of LLQ pain that started about 1 mo ago. Pt reports she presented to the ED yesterday to [facility], was told of a 9cm L adnexal cyst patient was not aware of. Pt states there, she ran a max temperature of 100.3*F. Patient was observed and then discharge on pain medications (percocet) and outpatient f/u. Pt represented to [facility] this AM because of persistent bleeding through her percocet, and persistent chills. Patient reports she has  had nightly chills on and off this past three months, especially around the time of her period. This AM, she was dizzy, nausea. Patient denies chest pain, shortness of breath, vomiting or diarrhea.    On ED presentation, she was tachycardic to 123 bmp, hypotensive to 80s/50s despite 1L bolus x3, with leukocytosis to 17 on labs.     Sexually active w/ , no hx of prior pelvic infections. No changes in bowel or bladder habits.          Name of GYN Physician:  NONE    OBHx:none  GYNHx: denies fibroids, cysts, abnormal paps, STDs  PMH: denies  PSH: wrist surgery  Meds: PNVs  All: NKDA  Soc: no substance use, anxiety/depression

## 2023-03-03 NOTE — ED ADULT NURSE NOTE - OBJECTIVE STATEMENT
Pleasant middle aged female with reports of LLQ abdominal pain with associated nausea and diarrhea. No blood in stools. No vomiting. Previously evaluated and noted to have an ovarian cyst. Pain is improved with percocet per patient.  Reporting low-grade fevers and night sweats.

## 2023-03-03 NOTE — DISCHARGE NOTE PROVIDER - NSDCFUADDAPPT_GEN_ALL_CORE_FT
-needs immediate follow up with PMD to discuss further work up of incidental lung nodules findings, report given to patient -needs immediate follow up with PMD to discuss further work up of incidental lung nodules findings, report given to patient  -Make an appointment with interventional radiology by calling the IR booking office at [phone]

## 2023-03-03 NOTE — ED PROVIDER NOTE - CLINICAL SUMMARY MEDICAL DECISION MAKING FREE TEXT BOX
Attending MD Larsen: 43 yo F with no reported PMH p/w LLQ x 2 days. Seen at [facility] ED yesterday for same sxs. They performed CT AP with IV contrast which revealed "left adnexal mass measuring 9.0 cm as described above with infiltration of the fat adjacent to the mass; differential for the mass includes a   torsed ovary (possibly with an associated cystic mass) tubo-ovarian vs neoplasm." Pt had f/u transvag that showed "left ovary is enlarged consistent with the appearance on the CT with a central heterogeneous focus. This may represent a large hemorrhagic cyst." Negative beta hcg, UA with trace bacteria. Patient with continued pain and presents to [facility] today.  Abd soft but with rebound and LLQ tenderness to palpation.  Repeat US shows same mass but there is arterial and venous flow to the ovary.  Will consult with GYN and start IV antibiotics.  Patient give IVF for hypotension and SICU consulted.

## 2023-03-03 NOTE — DISCHARGE NOTE PROVIDER - NSDCCPCAREPLAN_GEN_ALL_CORE_FT
PRINCIPAL DISCHARGE DIAGNOSIS  Diagnosis: Ovarian cyst  Assessment and Plan of Treatment:       SECONDARY DISCHARGE DIAGNOSES  Diagnosis: Fever  Assessment and Plan of Treatment:

## 2023-03-03 NOTE — ED PROVIDER NOTE - MDM ORDERS SUBMITTED SELECTION
Constitutional: no fever, sweats, and no chills.  Eyes: no pain, no redness, and no visual changes.  ENMT: no ear pain and no hearing problems, no nasal congestion/drainage, no dysphagia, and no throat pain, no neck pain, no stiffness  CV: no chest pain, no edema.  Resp: no cough, +dyspnea  GI: no abdominal pain, no bloating, no constipation, no diarrhea, +nausea and no vomiting.  : no dysuria, no hematuria  MSK: no msk pain, no weakness  Skin: no lesions, and no rashes.  Neuro: no LOC, no headache, no sensory deficits, and no weakness. Labs/Imaging Studies

## 2023-03-03 NOTE — ED PROVIDER NOTE - ATTENDING APP SHARED VISIT CONTRIBUTION OF CARE
Attending MD Larsen:  I personally have seen and examined this patient.  PA note reviewed and agree on plan of care and except where noted.  Please see my MDM for further details.

## 2023-03-03 NOTE — ED ADULT NURSE NOTE - CHIEF COMPLAINT QUOTE
left lower abdominal pain  intermittent since January, was seen in [facility] ER yesterday, told she has Ovarian Cyst, worsening pain, diarrhea and fever last night

## 2023-03-03 NOTE — CONSULT NOTE ADULT - SUBJECTIVE AND OBJECTIVE BOX
HISTORY OF PRESENT ILLNESS:  MOLINA BAKER is a 43yo G0 LMP 3/1 with no pertinent PMHx presented 3/3 to OSH w/ acute worsening of LLQ pain that x 1 month. CT shows L adnexal mass measuring 9 cm with infiltration of fat adjacent to the mass. Patient reports she is sexually active w/ , no hx of prior pelvic infections. No changes in bowel or bladder habits. TMax 100.3, patient endorses fever and chills.  Patient denies chest pain, shortness of breath, vomiting or diarrhea. Patient was observed and then discharge on pain medications (percocet) and outpatient f/u. Patient represented to [facility] this AM because of persistent bleeding through her percocet, and persistent chills. Here patient tachycardic, hypotensive to 80s/50s despite 3L IVF, with leukocytosis to 17 on labs. Admitted to SICU for close hemodynamic monitoring.     PAST MEDICAL HISTORY: No pertinent past medical history        PAST SURGICAL HISTORY: No significant past surgical history        FAMILY HISTORY:     SOCIAL HISTORY:  · Substance use	No  · Social History (marital status, living situation, occupation, and sexual history)	no toxic exposures      CODE STATUS: Full    HOME MEDICATIONS:  * Patient Currently Takes Medications as of 02-Mar-2023 19:43 documented in Structured Notes  · 	oxycodone-acetaminophen 5 mg-325 mg oral tablet: 1 tab(s) orally every 6 hours, As Needed for severe pain MDD:4   · 	methocarbamol 500 mg oral tablet: 1 tab(s) orally every 8 hours, As Needed   · 	 mg oral tablet: 1 tab(s) orally every 6 hours, As Needed     ALLERGIES: No Known Allergies      VITAL SIGNS:  ICU Vital Signs Last 24 Hrs  T(C): 36.9 (03 Mar 2023 17:35), Max: 38 (03 Mar 2023 11:30)  T(F): 98.4 (03 Mar 2023 17:35), Max: 100.4 (03 Mar 2023 11:30)  HR: 110 (03 Mar 2023 18:00) (104 - 124)  BP: 91/58 (03 Mar 2023 18:00) (83/58 - 100/67)  BP(mean): 69 (03 Mar 2023 18:00) (69 - 72)  ABP: --  ABP(mean): --  RR: 25 (03 Mar 2023 18:00) (18 - 37)  SpO2: 99% (03 Mar 2023 18:00) (96% - 100%)    O2 Parameters below as of 03 Mar 2023 17:35  Patient On (Oxygen Delivery Method): room air            NEURO  Exam:  Meds:HYDROmorphone  Injectable 0.25 milliGRAM(s) IV Push every 3 hours PRN breakthrough pain  oxyCODONE    IR 2.5 milliGRAM(s) Oral every 4 hours PRN Moderate Pain (4 - 6)  oxyCODONE    IR 5 milliGRAM(s) Oral every 4 hours PRN Severe Pain (7 - 10)      RESPIRATORY  Mechanical Ventilation:   Exam: CTA b/l. No wheezing, rales, rhonchi appreciated.   Meds:    CARDIOVASCULAR  VBG - ( 03 Mar 2023 11:42 )  pH: 7.39  /  pCO2: 40    /  pO2: 49    / HCO3: 24    / Base Excess: -0.7  /  SaO2: 81.4   Lactate: 1.6    Exam: S1S2. No murmurs, rubs, gallops appreciated.   Cardiac Rhythm: Tachycardic rate 115  Meds:    GI/NUTRITION  Exam: Soft, non distended, non-tender.  Diet: NPO except medications   Meds:    GENITOURINARY/RENAL  Meds:lactated ringers. 1000 milliLiter(s) IV Continuous <Continuous>      03-03 @ 07:01  -  03-03 @ 19:20  --------------------------------------------------------  IN:    Lactated Ringers: 100 mL  Total IN: 100 mL    OUT:  Total OUT: 0 mL    Total NET: 100 mL        Weight (kg): 45.4 (03-03 @ 10:43)  03-03    134<L>  |  98  |  13  ----------------------------<  121<H>  3.4<L>   |  23  |  0.63    Ca    9.0      03 Mar 2023 12:05    TPro  6.6  /  Alb  3.6  /  TBili  0.7  /  DBili  x   /  AST  10  /  ALT  11  /  AlkPhos  84  03-03    [ ] Holm catheter, indication: urine output monitoring in critically ill patient    HEMATOLOGIC  [ ] VTE Prophylaxis:                          9.2    17.58 )-----------( 317      ( 03 Mar 2023 12:05 )             29.7     PT/INR - ( 03 Mar 2023 12:05 )   PT: 26.8 sec;   INR: 2.29 ratio         PTT - ( 03 Mar 2023 12:05 )  PTT:33.8 sec  Transfusion: [ ] PRBC	[ ] Platelets	[ ] FFP	[ ] Cryoprecipitate      INFECTIOUS DISEASES  Meds:cefoTEtan  IVPB 2 Gram(s) IV Intermittent every 12 hours  doxycycline IVPB 100 milliGRAM(s) IV Intermittent every 12 hours  metroNIDAZOLE  IVPB 500 milliGRAM(s) IV Intermittent every 8 hours    RECENT CULTURES:      ENDOCRINE  Meds:  CAPILLARY BLOOD GLUCOSE          PATIENT CARE ACCESS DEVICES:  [ X ] Peripheral IV  [ ] Central Venous Line	[ ] R	[ ] L	[ ] IJ	[ ] Fem	[ ] SC	Placed:   [ ] Arterial Line		[ ] R	[ ] L	[ ] Fem	[ ] Rad	[ ] Ax	Placed:   [ ] PICC:					[ ] Mediport  [ ] Urinary Catheter, Date Placed:   [x] Necessity of urinary, arterial, and venous catheters discussed    OTHER MEDICATIONS: chlorhexidine 2% Cloths 1 Application(s) Topical daily      IMAGING STUDIES: < from: CT Abdomen and Pelvis w/ IV Cont (03.02.23 @ 14:56) >    IMPRESSION:    Left adnexal mass measuring 9.0 cm as described above with infiltration   of the fat adjacent to the mass; differential for the mass includes a   torsed ovary (possibly with an associated cystic mass) tubo-ovarian   abscess or neoplasm; initial further evaluation is recommended with a   pelvic ultrasound, however a pre and post IV contrast MRI of the pelvis   is also recommended.    Infiltration of thefat adjacent to the mass and multiple lymph nodes   measuring up to 8 mm short axis; differential for the infiltration of the   fat includes inflammation or tumor.    Left lower lobe pulmonary nodule measuring 6 mm and partially imaged   right lower lobe pulmonary nodule measuring 5 mm; a noncontrast chest CT   is recommended for complete evaluation of the chest.    The findings were discussed with Dr. Vincent at 4:16 PM on 3/2/2023.    --- End of Report ---      < end of copied text >  < from: US Transvaginal (03.03.23 @ 12:55) >    IMPRESSION:    No significant change in enlarged left ovary with complex cystic lesion,   likely a hemorrhagic cyst. Ovarian cystic neoplasm cannot be excluded.    Intermittent torsion is a concern given the size and location of the left   ovary.    Small volume free fluid in cul-de-sac.    < end of copied text >  < from: US Doppler Pelvis (03.03.23 @ 12:55) >  IMPRESSION:    No significant change in enlarged left ovary with complex cystic lesion,   likely a hemorrhagic cyst. Ovarian cystic neoplasm cannot be excluded.    Intermittent torsion is a concern given the size and location of the left   ovary.    Small volume free fluid in cul-de-sac.    --- End of Report ---    < end of copied text >

## 2023-03-03 NOTE — DISCHARGE NOTE PROVIDER - NSDCMRMEDTOKEN_GEN_ALL_CORE_FT
mg oral tablet: 1 tab(s) orally every 6 hours, As Needed   methocarbamol 500 mg oral tablet: 1 tab(s) orally every 8 hours, As Needed   oxycodone-acetaminophen 5 mg-325 mg oral tablet: 1 tab(s) orally every 6 hours, As Needed for severe pain MDD:4    Bactrim  mg-160 mg oral tablet: 1 tab(s) orally 2 times a day MDD:2 tabs per day  FIRST Mouthwash BLM mucous membrane suspension: 1 milliliter(s) by transmucosal administration every 6 hours MDD:4 applications per day   mg oral tablet: 1 tab(s) orally every 6 hours, As Needed   methocarbamol 500 mg oral tablet: 1 tab(s) orally every 8 hours, As Needed   oxycodone-acetaminophen 5 mg-325 mg oral tablet: 1 tab(s) orally every 6 hours, As Needed for severe pain MDD:4   Probiotic Formula (Bacillus Coagulans) oral capsule: 1 cap(s) orally once a day MDD:1 tab per day

## 2023-03-03 NOTE — DISCHARGE NOTE PROVIDER - CARE PROVIDER_API CALL
Dennard PHILLIP,   [address]  [place], [place]  Phone: [phone]  Fax: (   )    -  Follow Up Time: 1 week   [facility],   [address]  [address]  [place], [place]  Phone: [phone]  Fax: (   )    -  Follow Up Time: 1 week    Interventional radiology,   Make appt in 2 weeks  Phone: [phone]  Fax: (   )    -  Follow Up Time: 2 weeks

## 2023-03-03 NOTE — ED PROVIDER NOTE - PHYSICAL EXAMINATION
CONSTITUTIONAL: Well appearing and in no apparent distress.  ENT: Airway patent, moist mucous membranes.   EYES: Pupils equal, round and reactive to light. EOMI. Conjunctiva normal appearing.   CARDIAC: Tachycardic rate, regular rhythm.  Heart sounds S1, S2.    RESPIRATORY: Breath sounds clear and equal bilaterally.   GASTROINTESTINAL: Abdomen soft, TTP LLQ, mildly distended. +Rebound, no guarding. No CVAT bilaterally.  PELVIC: +Vaginal bleeding, currently menstruating. No CMT. +L adnexal TTP. chaperoned by nurse Dao.  MUSCULOSKELETAL: Spine appears normal.  NEUROLOGICAL: Alert and oriented x3, no focal deficits, no motor or sensory deficits. 5/5 muscle strength throughout.  SKIN: Skin normal color, warm, dry and intact.   PSYCHIATRIC: Normal mood and affect.

## 2023-03-03 NOTE — H&P ADULT - NSHPLABSRESULTS_GEN_ALL_CORE
Vital Signs Last 24 Hrs  T(C): 38 (03 Mar 2023 11:30), Max: 38 (03 Mar 2023 11:30)  T(F): 100.4 (03 Mar 2023 11:30), Max: 100.4 (03 Mar 2023 11:30)  HR: 104 (03 Mar 2023 15:00) (104 - 124)  BP: 86/53 (03 Mar 2023 15:00) (83/58 - 100/67)  BP(mean): --  RR: 24 (03 Mar 2023 15:00) (18 - 24)  SpO2: 100% (03 Mar 2023 15:00) (96% - 100%)    Parameters below as of 03 Mar 2023 15:00  Patient On (Oxygen Delivery Method): room air                            9.2    17.58 )-----------( 317      ( 03 Mar 2023 12:05 )             29.7     03-03    134<L>  |  98  |  13  ----------------------------<  121<H>  3.4<L>   |  23  |  0.63    Ca    9.0      03 Mar 2023 12:05    TPro  6.6  /  Alb  3.6  /  TBili  0.7  /  DBili  x   /  AST  10  /  ALT  11  /  AlkPhos  84  03-03    I&O's Detail    PT/INR - ( 03 Mar 2023 12:05 )   PT: 26.8 sec;   INR: 2.29 ratio         PTT - ( 03 Mar 2023 12:05 )  PTT:33.8 sec  Urinalysis Basic - ( 02 Mar 2023 15:15 )    Color: Yellow / Appearance: Clear / S.005 / pH: x  Gluc: x / Ketone: Small  / Bili: Negative / Urobili: Negative   Blood: x / Protein: 15 / Nitrite: Negative   Leuk Esterase: Negative / RBC: 0-4 /HPF / WBC Negative /HPF   Sq Epi: x / Non Sq Epi: Neg.-Few / Bacteria: Trace /HPF      < end of copied text >

## 2023-03-03 NOTE — ED ADULT NURSE REASSESSMENT NOTE - NS ED NURSE REASSESS COMMENT FT1
Second liter completed. PA aware of most recent BP. Patient denies sensation of being lightheaded, dizzy, short of breath, or experiencing chest pain. Nondiaphoretic.

## 2023-03-03 NOTE — H&P ADULT - NSHPPHYSICALEXAM_GEN_ALL_CORE
Gen: NAD  Cards: RRR  Pulm: CTAB  Abd: reflexive guarding, diffusely tender, NR.  : dark brown discharge, + CMT  Ext: warm, well perfused,

## 2023-03-03 NOTE — PATIENT PROFILE ADULT - FUNCTIONAL ASSESSMENT - DAILY ACTIVITY 4.
atorvastatin  Last Written Prescription Date: 1/30/19  Last Fill Quantity: 135 # of Refills: 1  Last Office Visit: 5/1/19      
4 = No assist / stand by assistance

## 2023-03-03 NOTE — H&P ADULT - ASSESSMENT
41yo G0 LMP 3/1 presented w/ acute worsening of LLQ, tvus w/  enlarged left ovary with complex cystic lesion, likely a hemorrhagic cyst vs ovarian cystic neoplasm cannot be excluded. VS significant for hypotension and tachycardia, leukocytosis to 17. Clinically patient appears septic with possible TOA, however cannot r/o alternative sources of infection and/or malignant process at this time:    - Admit to GYN for presumed treatment of TOA  - Hold off on drawing tumor markers i/s/o inflammatory process  - Start IV antibiotics: cefotetan qD, doxy, flagy BID  - f/u Urine GC/Chlamydia, HIV, RPR, Hep B, Hep C  - f/u BCx x2, UCx; lactate wnl, coags elevated. will repeat PRN  - Consult IR, SICU for higher level monitoring given hemodynamic instability  - AM CBC, BMP, Coags  - IVH / bolus as needed, x4Sfsiurz IV, OxyORN  - Hold HSQ given possible IR intervention    Amyeo Afroz Jereen, PGY-2  Seen and discussed w/ Dr Bowles

## 2023-03-03 NOTE — DISCHARGE NOTE PROVIDER - NSDCFUADDINST_GEN_ALL_CORE_FT
Nothing per vagina for 6 weeks- no douching, tampons, sitz baths, sexual intercourse.  Call your doctor and go to the ER if you experience severe discomfort, chest pain, shortness of breath, fever greater than 100.4, of excessive vaginal bleeding greater than 1 pad/hr for 2 consecutive hours.  Take over the counter and prescribed medications for pain control as directed. Follow up with your doctor in 2 weeks.   Nothing per vagina for 6 weeks- no douching, tampons, sitz baths, sexual intercourse.  Call your doctor and go to the ER if you experience severe discomfort, chest pain, shortness of breath, fever greater than 100.4, of excessive vaginal bleeding greater than 1 pad/hr for 2 consecutive hours.  Take over the counter and prescribed medications for pain control as directed. Follow up with your doctor in 2 weeks.  Patient had CT chest done with incidental findings of Evidence of a few bilateral lung nodules. Dominant nodule measures 6 mm and is located in the right middle lobe, discussed with patient to follow up with PMD for further work up

## 2023-03-03 NOTE — ED ADULT TRIAGE NOTE - CHIEF COMPLAINT QUOTE
left lower abdominal pain  intermittent since January, was seen in [facility] ER yesterday, told she has Ovarian Cyst, worsening pain, diarrhea and fever last night

## 2023-03-03 NOTE — DISCHARGE NOTE PROVIDER - HOSPITAL COURSE
41yo G0 LMP 3/1 presented w/ acute worsening of LLQ, tvus w/  enlarged left ovary with complex cystic lesion and  clinically patient appeared septic with possible TOA, Patient was admitted to SICU for close observation. SHe was started on Cefotetan, Doxycyline and Flagyl (3/3-). IR was consulted for drainage and _______.       On day of discharge patient was ambulating, her pain controlled with oral medications, had adequate oral intake, and was voiding freely.  Discharge instructions and precautions were given.  Will have close follow up with [facility]. [facility].   43yo G0 LMP 3/1 presented w/ acute worsening of LLQ, tvus w/  enlarged left ovary with complex cystic lesion and  clinically patient appeared septic with possible TOA, Patient was admitted to SICU for close observation. SHe was started on Cefotetan, Doxycyline and Flagyl (3/3-). IR was consulted for drainage and _______.       On day of discharge patient was ambulating, her pain controlled with oral medications, had adequate oral intake, and was voiding freely.  Discharge instructions and precautions were given.  Will have close follow up with [facility].   43yo G0 LMP 3/1 presented w/ acute worsening of LLQ, tvus w/  enlarged left ovary with complex cystic lesion and  clinically patient appeared septic with possible TOA, Patient was admitted to SICU for close observation. SHe was started on Cefotetan, Doxycyline and Flagyl (3/3-). IR was consulted for drainage and the patient underwent an IR drainage on 3/5 of purulent fluid. Cultures were sent. On 3/5 she was stepped down to the floor as her vital signs were improved and she was hemodynamically stable. IV antibiotics were continued. On HD#4 the patient had increased nausea and vomiting after gargling mouthwash and drinking milk. She was given anti-emetics and the nausea was improved overnight. On HD#5 the patient was able to tolerate solid food. TUESDAY.       On day of discharge patient was ambulating, her pain controlled with oral medications, had adequate oral intake, and was voiding freely.  Discharge instructions and precautions were given.  Will have close follow up with [facility].   41yo G0 LMP 3/1 presented w/ acute worsening of LLQ, tvus w/  enlarged left ovary with complex cystic lesion and  clinically patient appeared septic with possible TOA, Patient was admitted to SICU for close observation. SHe was started on Cefotetan, Doxycyline and Flagyl (3/3-). IR was consulted for drainage and the patient underwent an IR drainage on 3/5 of purulent fluid. Cultures were sent. On 3/5 she was stepped down to the floor as her vital signs were improved and she was hemodynamically stable. IV antibiotics were continued. On HD#4 the patient had increased nausea and vomiting after gargling mouthwash and drinking milk. She was given anti-emetics and the nausea was improved overnight. On HD#5 the patient was able to tolerate solid food. On HD#6 the patient was transitioned to PO antibiotics based off of culture sensitivities.       On day of discharge patient was ambulating, her pain controlled with oral medications, had adequate oral intake, and was voiding freely.  Discharge instructions and precautions were given.  Will have close follow up with [facility].   43yo G0 LMP 3/1 presented w/ acute worsening of LLQ, tvus w/  enlarged left ovary with complex cystic lesion and  clinically patient appeared septic with possible TOA, Patient was admitted to SICU for close observation. SHe was started on Cefotetan, Doxycyline and Flagyl (3/3-). IR was consulted for drainage and the patient underwent an IR drainage on 3/5 of purulent fluid. Cultures were sent. On 3/5 she was stepped down to the floor as her vital signs were improved and she was hemodynamically stable. IV antibiotics were continued. On HD#4 the patient had increased nausea and vomiting after gargling mouthwash and drinking milk. She was given anti-emetics and the nausea was improved overnight. On HD#5 the patient was able to tolerate solid food. On HD#6 the patient was transitioned to PO antibiotics based off of culture sensitivities. On HD#7 she was tolerating the PO antibiotics and had no signs or symptoms of infection.       On day of discharge patient was ambulating, her pain controlled with oral medications, had adequate oral intake, and was voiding freely.  Discharge instructions and precautions were given.  Will have close follow up with [facility].

## 2023-03-03 NOTE — ED PROVIDER NOTE - OBJECTIVE STATEMENT
43 yo F with no reported PMH p/w LLQ x 2 days. Pain now radiating across lower abdomen. Described as sharp and cramp like. Has been constant since onset. +Nausea, no vomiting. Was able to take percocet this AM. +Dysuria and vaginal bleeding, currently menstruating. Has also been experiencing non blood diarrhea. A/w low grade fever of 100.2 and chills. Of note, pt was seen at [facility] ED yesterday for same sxs. They performed CT AP with IV contrast which revealed "left adnexal mass measuring 9.0 cm as described above with infiltration of the fat adjacent to the mass; differential for the mass includes a   torsed ovary (possibly with an associated cystic mass) tubo-ovarian vs neoplasm." Pt had f/u transvag that showed "left ovary is enlarged consistent with the appearance on the CT with a central heterogeneous focus. This may represent a large hemorrhagic cyst." States she was dc on percocet and a muscle relaxant. Denies chest pain, SOB, cough, hematuria, headache, dizziness, weakness. Sexually active with . Does not have GYN MD. NO hx of STIs. 41 yo F with no reported PMH p/w LLQ x 2 days. Pain now radiating across lower abdomen. Described as sharp and cramp like. Has been constant since onset. +Nausea, no vomiting. Was able to take percocet this AM. +Dysuria and vaginal bleeding, currently menstruating. Has also been experiencing non blood diarrhea. A/w low grade fever of 100.2 and chills. Of note, pt was seen at [facility] ED yesterday for same sxs. They performed CT AP with IV contrast which revealed "left adnexal mass measuring 9.0 cm as described above with infiltration of the fat adjacent to the mass; differential for the mass includes a   torsed ovary (possibly with an associated cystic mass) tubo-ovarian vs neoplasm." Pt had f/u transvag that showed "left ovary is enlarged consistent with the appearance on the CT with a central heterogeneous focus. This may represent a large hemorrhagic cyst." Negative beta hcg, UA with trace bacteria. COVID/flu negative. States she was dc on percocet and a muscle relaxant. Denies chest pain, SOB, cough, hematuria, headache, dizziness, weakness. Sexually active with . Does not have GYN MD. NO hx of STIs.

## 2023-03-03 NOTE — H&P ADULT - NSHPREVIEWOFSYSTEMS_GEN_ALL_CORE
< from: US Transvaginal (03.03.23 @ 12:55) >    FINDINGS:  Uterus: 7.7 cm x 3.7 cm x 4.5 cm. Anterior uterine body intramural   fibroid measuring 9 mm.  Endometrium: 4 mm. Within normal limits.    Right ovary: 3.9 cm x 2.4 cm x 3.0 cm. Small follicles seen. Normal   arterial and venous waveforms.    Left ovary: 10.4 cm x 7.0 cm x 7.3 cm. Normal arterial and venous   waveforms.  No significant change in enlarged left ovary with 7.6 x 4.7 x   5.8 cm cystic structure and central soft tissue components. No definite   vascularity is demonstrated on color Doppler in the soft tissue component.    Fluid: Small volume fluid in the cul-de-sac.    IMPRESSION:    No significant change in enlarged left ovary with complex cystic lesion,   likely a hemorrhagic cyst. Ovarian cystic neoplasm cannot be excluded.    Intermittent torsion is a concern given the size and location of the left   ovary.    Small volume free fluid in cul-de-sac.    --- End of Report ---

## 2023-03-03 NOTE — H&P ADULT - ATTENDING COMMENTS
Pt seen in ER w gyn resident- 41yo presents w LLQ pain and large complex adnexal mass.  Pt noted to be febrile in ER w inc WBC, tachycardia and hypotension.  On exam abd is soft w diffuse tenderness but no rebound or guarding.  Spoke w pt and ,  Explained concern for TOA.  Will admit for IV abx.  Explained that she may require IR drainage vs surgical mgt which may require LSO.  Pt w persistent hypotension despite aggressive IVF.  SICU called for consult and pt accepted to SICU care.  Case D/W ED team and pt.  Spoke w [facility]- will plan for medical mgt w IV abx.  If pt responds consider MRI for better characterization.  All questions answered.    SHAKIRA Bowles

## 2023-03-03 NOTE — DISCHARGE NOTE PROVIDER - PROVIDER TOKENS
FREE:[LAST:[[facility] PHILLIP],PHONE:[phone]],FAX:[(   )    -],ADDRESS:[[address] [place] [address]],FOLLOWUP:[1 week]] FREE:[LAST:[[facility]],PHONE:[phone]],FAX:[(   )    -],ADDRESS:[[address] [place] [address]],FOLLOWUP:[1 week]],FREE:[LAST:[Interventional radiology],PHONE:[phone]],FAX:[(   )    -],ADDRESS:[Make appt in 2 weeks],FOLLOWUP:[2 weeks]]

## 2023-03-04 LAB
ALBUMIN SERPL ELPH-MCNC: 2.8 G/DL — LOW (ref 3.3–5)
ALP SERPL-CCNC: 78 U/L — SIGNIFICANT CHANGE UP (ref 40–120)
ALT FLD-CCNC: 9 U/L — LOW (ref 10–45)
ANION GAP SERPL CALC-SCNC: 12 MMOL/L — SIGNIFICANT CHANGE UP (ref 5–17)
APTT BLD: 33.9 SEC — SIGNIFICANT CHANGE UP (ref 27.5–35.5)
AST SERPL-CCNC: 10 U/L — SIGNIFICANT CHANGE UP (ref 10–40)
BASE EXCESS BLDV CALC-SCNC: -2.6 MMOL/L — LOW (ref -2–3)
BILIRUB SERPL-MCNC: 0.3 MG/DL — SIGNIFICANT CHANGE UP (ref 0.2–1.2)
BUN SERPL-MCNC: 7 MG/DL — SIGNIFICANT CHANGE UP (ref 7–23)
C TRACH RRNA SPEC QL NAA+PROBE: SIGNIFICANT CHANGE UP
CA-I SERPL-SCNC: 1.22 MMOL/L — SIGNIFICANT CHANGE UP (ref 1.15–1.33)
CALCIUM SERPL-MCNC: 8.4 MG/DL — SIGNIFICANT CHANGE UP (ref 8.4–10.5)
CHLORIDE BLDV-SCNC: 105 MMOL/L — SIGNIFICANT CHANGE UP (ref 96–108)
CHLORIDE SERPL-SCNC: 106 MMOL/L — SIGNIFICANT CHANGE UP (ref 96–108)
CO2 BLDV-SCNC: 25 MMOL/L — SIGNIFICANT CHANGE UP (ref 22–26)
CO2 SERPL-SCNC: 20 MMOL/L — LOW (ref 22–31)
CREAT SERPL-MCNC: 0.53 MG/DL — SIGNIFICANT CHANGE UP (ref 0.5–1.3)
EGFR: 119 ML/MIN/1.73M2 — SIGNIFICANT CHANGE UP
GAS PNL BLDV: 133 MMOL/L — LOW (ref 136–145)
GAS PNL BLDV: SIGNIFICANT CHANGE UP
GAS PNL BLDV: SIGNIFICANT CHANGE UP
GLUCOSE BLDV-MCNC: 127 MG/DL — HIGH (ref 70–99)
GLUCOSE SERPL-MCNC: 133 MG/DL — HIGH (ref 70–99)
HAV IGM SER-ACNC: SIGNIFICANT CHANGE UP
HBV CORE IGM SER-ACNC: SIGNIFICANT CHANGE UP
HBV SURFACE AG SER-ACNC: SIGNIFICANT CHANGE UP
HCO3 BLDV-SCNC: 23 MMOL/L — SIGNIFICANT CHANGE UP (ref 22–29)
HCT VFR BLD CALC: 26.9 % — LOW (ref 34.5–45)
HCT VFR BLDA CALC: 25 % — LOW (ref 34.5–46.5)
HCV AB S/CO SERPL IA: 0.12 S/CO — SIGNIFICANT CHANGE UP (ref 0–0.99)
HCV AB SERPL-IMP: SIGNIFICANT CHANGE UP
HGB BLD CALC-MCNC: 8.2 G/DL — LOW (ref 11.7–16.1)
HGB BLD-MCNC: 8.2 G/DL — LOW (ref 11.5–15.5)
HOROWITZ INDEX BLDV+IHG-RTO: 21 — SIGNIFICANT CHANGE UP
INR BLD: 1.83 RATIO — HIGH (ref 0.88–1.16)
LACTATE BLDV-MCNC: 1.2 MMOL/L — SIGNIFICANT CHANGE UP (ref 0.5–2)
MAGNESIUM SERPL-MCNC: 2.5 MG/DL — SIGNIFICANT CHANGE UP (ref 1.6–2.6)
MCHC RBC-ENTMCNC: 23.6 PG — LOW (ref 27–34)
MCHC RBC-ENTMCNC: 30.5 GM/DL — LOW (ref 32–36)
MCV RBC AUTO: 77.3 FL — LOW (ref 80–100)
N GONORRHOEA RRNA SPEC QL NAA+PROBE: SIGNIFICANT CHANGE UP
NRBC # BLD: 0 /100 WBCS — SIGNIFICANT CHANGE UP (ref 0–0)
PCO2 BLDV: 44 MMHG — HIGH (ref 39–42)
PH BLDV: 7.33 — SIGNIFICANT CHANGE UP (ref 7.32–7.43)
PHOSPHATE SERPL-MCNC: 4 MG/DL — SIGNIFICANT CHANGE UP (ref 2.5–4.5)
PLATELET # BLD AUTO: 263 K/UL — SIGNIFICANT CHANGE UP (ref 150–400)
PO2 BLDV: 42 MMHG — SIGNIFICANT CHANGE UP (ref 25–45)
POTASSIUM BLDV-SCNC: 3.4 MMOL/L — LOW (ref 3.5–5.1)
POTASSIUM SERPL-MCNC: 3.6 MMOL/L — SIGNIFICANT CHANGE UP (ref 3.5–5.3)
POTASSIUM SERPL-SCNC: 3.6 MMOL/L — SIGNIFICANT CHANGE UP (ref 3.5–5.3)
PROCALCITONIN SERPL-MCNC: 13.51 NG/ML — HIGH (ref 0.02–0.1)
PROT SERPL-MCNC: 5.8 G/DL — LOW (ref 6–8.3)
PROTHROM AB SERPL-ACNC: 21.4 SEC — HIGH (ref 10.5–13.4)
RBC # BLD: 3.48 M/UL — LOW (ref 3.8–5.2)
RBC # FLD: 15.9 % — HIGH (ref 10.3–14.5)
SAO2 % BLDV: 68.3 % — SIGNIFICANT CHANGE UP (ref 67–88)
SODIUM SERPL-SCNC: 138 MMOL/L — SIGNIFICANT CHANGE UP (ref 135–145)
SPECIMEN SOURCE: SIGNIFICANT CHANGE UP
WBC # BLD: 17.12 K/UL — HIGH (ref 3.8–10.5)
WBC # FLD AUTO: 17.12 K/UL — HIGH (ref 3.8–10.5)

## 2023-03-04 PROCEDURE — 99232 SBSQ HOSP IP/OBS MODERATE 35: CPT

## 2023-03-04 RX ORDER — HYDROMORPHONE HYDROCHLORIDE 2 MG/ML
0.25 INJECTION INTRAMUSCULAR; INTRAVENOUS; SUBCUTANEOUS
Refills: 0 | Status: DISCONTINUED | OUTPATIENT
Start: 2023-03-04 | End: 2023-03-05

## 2023-03-04 RX ORDER — POTASSIUM CHLORIDE 20 MEQ
40 PACKET (EA) ORAL ONCE
Refills: 0 | Status: COMPLETED | OUTPATIENT
Start: 2023-03-04 | End: 2023-03-04

## 2023-03-04 RX ORDER — SODIUM CHLORIDE 9 MG/ML
1000 INJECTION, SOLUTION INTRAVENOUS ONCE
Refills: 0 | Status: COMPLETED | OUTPATIENT
Start: 2023-03-04 | End: 2023-03-04

## 2023-03-04 RX ORDER — TRAMADOL HYDROCHLORIDE 50 MG/1
25 TABLET ORAL EVERY 6 HOURS
Refills: 0 | Status: DISCONTINUED | OUTPATIENT
Start: 2023-03-04 | End: 2023-03-06

## 2023-03-04 RX ORDER — ONDANSETRON 8 MG/1
4 TABLET, FILM COATED ORAL ONCE
Refills: 0 | Status: COMPLETED | OUTPATIENT
Start: 2023-03-04 | End: 2023-03-04

## 2023-03-04 RX ADMIN — Medication 100 MILLIGRAM(S): at 13:22

## 2023-03-04 RX ADMIN — HYDROMORPHONE HYDROCHLORIDE 0.25 MILLIGRAM(S): 2 INJECTION INTRAMUSCULAR; INTRAVENOUS; SUBCUTANEOUS at 22:32

## 2023-03-04 RX ADMIN — HYDROMORPHONE HYDROCHLORIDE 0.25 MILLIGRAM(S): 2 INJECTION INTRAMUSCULAR; INTRAVENOUS; SUBCUTANEOUS at 04:27

## 2023-03-04 RX ADMIN — HYDROMORPHONE HYDROCHLORIDE 0.25 MILLIGRAM(S): 2 INJECTION INTRAMUSCULAR; INTRAVENOUS; SUBCUTANEOUS at 22:17

## 2023-03-04 RX ADMIN — Medication 300 MILLIGRAM(S): at 05:42

## 2023-03-04 RX ADMIN — SODIUM CHLORIDE 100 MILLILITER(S): 9 INJECTION, SOLUTION INTRAVENOUS at 07:41

## 2023-03-04 RX ADMIN — HYDROMORPHONE HYDROCHLORIDE 0.25 MILLIGRAM(S): 2 INJECTION INTRAMUSCULAR; INTRAVENOUS; SUBCUTANEOUS at 11:00

## 2023-03-04 RX ADMIN — Medication 100 MILLIGRAM(S): at 06:43

## 2023-03-04 RX ADMIN — ONDANSETRON 4 MILLIGRAM(S): 8 TABLET, FILM COATED ORAL at 08:30

## 2023-03-04 RX ADMIN — TRAMADOL HYDROCHLORIDE 25 MILLIGRAM(S): 50 TABLET ORAL at 21:04

## 2023-03-04 RX ADMIN — HYDROMORPHONE HYDROCHLORIDE 0.25 MILLIGRAM(S): 2 INJECTION INTRAMUSCULAR; INTRAVENOUS; SUBCUTANEOUS at 10:45

## 2023-03-04 RX ADMIN — TRAMADOL HYDROCHLORIDE 25 MILLIGRAM(S): 50 TABLET ORAL at 20:34

## 2023-03-04 RX ADMIN — HYDROMORPHONE HYDROCHLORIDE 0.25 MILLIGRAM(S): 2 INJECTION INTRAMUSCULAR; INTRAVENOUS; SUBCUTANEOUS at 07:40

## 2023-03-04 RX ADMIN — Medication 100 GRAM(S): at 17:19

## 2023-03-04 RX ADMIN — Medication 750 MILLIGRAM(S): at 12:00

## 2023-03-04 RX ADMIN — Medication 100 MILLIGRAM(S): at 05:02

## 2023-03-04 RX ADMIN — Medication 255 MILLIMOLE(S): at 01:27

## 2023-03-04 RX ADMIN — Medication 300 MILLIGRAM(S): at 17:35

## 2023-03-04 RX ADMIN — Medication 750 MILLIGRAM(S): at 17:50

## 2023-03-04 RX ADMIN — Medication 100 MILLIGRAM(S): at 17:21

## 2023-03-04 RX ADMIN — HYDROMORPHONE HYDROCHLORIDE 0.25 MILLIGRAM(S): 2 INJECTION INTRAMUSCULAR; INTRAVENOUS; SUBCUTANEOUS at 04:12

## 2023-03-04 RX ADMIN — SODIUM CHLORIDE 1000 MILLILITER(S): 9 INJECTION, SOLUTION INTRAVENOUS at 06:10

## 2023-03-04 RX ADMIN — SODIUM CHLORIDE 100 MILLILITER(S): 9 INJECTION, SOLUTION INTRAVENOUS at 07:17

## 2023-03-04 RX ADMIN — CHLORHEXIDINE GLUCONATE 1 APPLICATION(S): 213 SOLUTION TOPICAL at 11:52

## 2023-03-04 RX ADMIN — HYDROMORPHONE HYDROCHLORIDE 0.25 MILLIGRAM(S): 2 INJECTION INTRAMUSCULAR; INTRAVENOUS; SUBCUTANEOUS at 07:55

## 2023-03-04 RX ADMIN — Medication 100 MILLIGRAM(S): at 22:17

## 2023-03-04 RX ADMIN — Medication 300 MILLIGRAM(S): at 11:45

## 2023-03-04 RX ADMIN — Medication 40 MILLIEQUIVALENT(S): at 06:10

## 2023-03-04 RX ADMIN — Medication 100 GRAM(S): at 06:09

## 2023-03-04 NOTE — PROGRESS NOTE ADULT - SUBJECTIVE AND OBJECTIVE BOX
24 HOUR EVENTS:  - MRI completed pending read    SUBJECTIVE/ROS:  [ ] A ten-point review of systems was otherwise negative except as noted.  [ ] Due to altered mental status/intubation, subjective information were not able to be obtained from the patient. History was obtained, to the extent possible, from review of the chart and collateral sources of information.      NEURO  Exam: AOx3. NAD. Follows commands. Moves all extremities. Strength and sensation intact.   Meds: acetaminophen   IVPB .. 750 milliGRAM(s) IV Intermittent every 6 hours  HYDROmorphone  Injectable 0.25 milliGRAM(s) IV Push every 3 hours PRN breakthrough pain  oxyCODONE    IR 2.5 milliGRAM(s) Oral every 4 hours PRN Moderate Pain (4 - 6)  oxyCODONE    IR 5 milliGRAM(s) Oral every 4 hours PRN Severe Pain (7 - 10)    [x] Adequacy of sedation and pain control has been assessed and adjusted      RESPIRATORY  RR: 29 (03-04-23 @ 00:00) (18 - 37)  SpO2: 94% (03-04-23 @ 00:00) (94% - 100%)  Wt(kg): --  Exam: CTA b/l. No murmurs, rubs, gallops appreciated.   Mechanical Ventilation:   [ ] Extubation Readiness Assessed  Meds:       CARDIOVASCULAR  HR: 115 (03-04-23 @ 00:00) (104 - 124)  BP: 88/52 (03-04-23 @ 00:00) (83/58 - 100/67)  BP(mean): 64 (03-04-23 @ 00:00) (64 - 72)  ABP: --  ABP(mean): --  Wt(kg): --  CVP(cm H2O): --  VBG - ( 03 Mar 2023 11:42 )  pH: 7.39  /  pCO2: 40    /  pO2: 49    / HCO3: 24    / Base Excess: -0.7  /  SaO2: 81.4   Lactate: 1.6    Exam: S1S2. No murmurs, rubs, gallops appreciated.  Cardiac Rhythm: NSR  Meds:       GI/NUTRITION  Exam: Soft, non-distended, non-tender.   Diet: NPO except medications  Meds:     GENITOURINARY  I&O's Detail    03-03 @ 07:01  -  03-04 @ 00:32  --------------------------------------------------------  IN:    IV PiggyBack: 50 mL    IV PiggyBack: 325 mL    IV PiggyBack: 250 mL    Lactated Ringers: 700 mL  Total IN: 1325 mL    OUT:    Voided (mL): 200 mL  Total OUT: 200 mL    Total NET: 1125 mL        Weight (kg): 45.4 (03-03 @ 10:43)  03-03    137  |  106  |  10  ----------------------------<  109<H>  3.5   |  20<L>  |  0.51    Ca    8.4      03 Mar 2023 19:21  Phos  1.5     03-03  Mg     1.7     03-03    TPro  6.1  /  Alb  3.0<L>  /  TBili  0.5  /  DBili  x   /  AST  10  /  ALT  11  /  AlkPhos  80  03-03    [ ] Holm catheter, indication: N/A  Meds: lactated ringers. 1000 milliLiter(s) IV Continuous <Continuous>  sodium phosphate 30 milliMole(s)/500 mL IVPB 30 milliMole(s) IV Intermittent once        HEMATOLOGIC  Meds:   [x] VTE Prophylaxis                        8.6    14.94 )-----------( 260      ( 03 Mar 2023 19:21 )             27.5     PT/INR - ( 03 Mar 2023 19:21 )   PT: 24.2 sec;   INR: 2.07 ratio         PTT - ( 03 Mar 2023 19:21 )  PTT:33.9 sec  Transfusion     [ ] PRBC   [ ] Platelets   [ ] FFP   [ ] Cryoprecipitate      INFECTIOUS DISEASES  T(C): 38 (03-03-23 @ 23:00), Max: 38 (03-03-23 @ 11:30)  Wt(kg): --  WBC Count: 14.94 K/uL (03-03 @ 19:21)  WBC Count: 17.58 K/uL (03-03 @ 12:05)    Recent Cultures:    Meds: cefoTEtan  IVPB 2 Gram(s) IV Intermittent every 12 hours  doxycycline IVPB 100 milliGRAM(s) IV Intermittent every 12 hours  metroNIDAZOLE  IVPB 500 milliGRAM(s) IV Intermittent every 8 hours        ENDOCRINE  Capillary Blood Glucose    Meds:       ACCESS DEVICES:  [ X ] Peripheral IV  [ ] Central Venous Line	[ ] R	[ ] L	[ ] IJ	[ ] Fem	[ ] SC	Placed:   [ ] Arterial Line		[ ] R	[ ] L	[ ] Fem	[ ] Rad	[ ] Ax	Placed:   [ ] PICC:					[ ] Mediport  [ ] Urinary Catheter, Date Placed:   [ ] Necessity of urinary, arterial, and venous catheters discussed    OTHER MEDICATIONS:  chlorhexidine 2% Cloths 1 Application(s) Topical daily      CODE STATUS: Full    IMAGING:

## 2023-03-04 NOTE — PROGRESS NOTE ADULT - SUBJECTIVE AND OBJECTIVE BOX
GYN Progress Note HD#2    Pt seen and examined at bedside in NAD. Abdominal pain 4/10 at baseline w/ 8/10 intermittent short lived spasmic pain, much less frequent w/ initiation of antibiotics. Reports fever overnight. Endorses mild nausea, no vomiting. Denies shortness of breath w/ discomfort whiling breathing due to fullness in belly. Patient denies lightheadedness chest pain, fevers, chills, diarrhea.    Patient reports no appetite, otherwise ambulating and voiding w/out issues.     Vital Signs Last 24 Hrs  T(F): 98.1 (04 Mar 2023 03:00), Max: 100.4 (03 Mar 2023 11:30)  HR: 97 (04 Mar 2023 07:00) (90 - 124)  BP: 91/56 (04 Mar 2023 07:00) (83/58 - 100/67)  RR: 24 (04 Mar 2023 07:00) (18 - 37)  SpO2: 99% (04 Mar 2023 07:00) (94% - 100%)    Gen: alert, oriented  CVS: RRR  Lungs: CTAB  Abdomen: Soft, mildy tender, non distended at areas where mass isn't present near epigastric region with mass edge palpated 3cm above umbilicus  Ext: No calf tenderness    MEDICATIONS  (STANDING):  acetaminophen   IVPB .. 750 milliGRAM(s) IV Intermittent every 6 hours  cefoTEtan  IVPB 2 Gram(s) IV Intermittent every 12 hours  chlorhexidine 2% Cloths 1 Application(s) Topical daily  doxycycline IVPB 100 milliGRAM(s) IV Intermittent every 12 hours  lactated ringers. 1000 milliLiter(s) (100 mL/Hr) IV Continuous <Continuous>  metroNIDAZOLE  IVPB 500 milliGRAM(s) IV Intermittent every 8 hours    MEDICATIONS  (PRN):  HYDROmorphone  Injectable 0.25 milliGRAM(s) IV Push every 3 hours PRN breakthrough pain  oxyCODONE    IR 2.5 milliGRAM(s) Oral every 4 hours PRN Moderate Pain (4 - 6)  oxyCODONE    IR 5 milliGRAM(s) Oral every 4 hours PRN Severe Pain (7 - 10)      LABS:                        8.2    17.12 )-----------( 263      ( 04 Mar 2023 04:24 )             26.9                         8.6    14.94 )-----------( 260      ( 03 Mar 2023 19:21 )             27.5                         9.2    17.58 )-----------( 317      ( 03 Mar 2023 12:05 )             29.7                         10.3   8.01  )-----------( 312      ( 02 Mar 2023 13:40 )             33.1     Magnesium, Serum: 2.5 mg/dL (03-04 @ 04:24)  Magnesium, Serum: 1.7 mg/dL (03-03 @ 19:21)  ABO Interpretation: O (03-03 @ 16:47)  Rh Interpretation: Positive (03-03 @ 16:47)  ABO Interpretation: O (03-03 @ 12:14)  Rh Interpretation: Positive (03-03 @ 12:14)  Antibody Screen: Negative (03-03 @ 12:14)    03-04-23 @ 04:24      138  |  106  |  7   ----------------------------<  133<H>  3.6   |  20<L>  |  0.53    03-03-23 @ 19:21      137  |  106  |  10  ----------------------------<  109<H>  3.5   |  20<L>  |  0.51    03-03-23 @ 12:05      134<L>  |  98  |  13  ----------------------------<  121<H>  3.4<L>   |  23  |  0.63    03-02-23 @ 13:40      135  |  100  |  9   ----------------------------<  111<H>  3.8   |  27  |  0.59        Ca    8.4      04 Mar 2023 04:24  Ca    8.4      03 Mar 2023 19:21  Ca    9.0      03 Mar 2023 12:05  Ca    8.9      02 Mar 2023 13:40  Phos  4.0     03-04  Phos  1.5<L>     03-03  Mg     2.5     03-04  Mg     1.7     03-03    TPro  5.8<L>  /  Alb  2.8<L>  /  TBili  0.3  /  DBili  x   /  AST  10  /  ALT  9<L>  /  AlkPhos  78  03-04-23 @ 04:24  TPro  6.1  /  Alb  3.0<L>  /  TBili  0.5  /  DBili  x   /  AST  10  /  ALT  11  /  AlkPhos  80  03-03-23 @ 19:21  TPro  6.6  /  Alb  3.6  /  TBili  0.7  /  DBili  x   /  AST  10  /  ALT  11  /  AlkPhos  84  03-03-23 @ 12:05  TPro  6.8  /  Alb  3.4  /  TBili  0.6  /  DBili  x   /  AST  15  /  ALT  14  /  AlkPhos  96  03-02-23 @ 13:40              03-03-23 @ 07:01  -  03-04-23 @ 07:00  --------------------------------------------------------  IN: 4140 mL / OUT: 850 mL / NET: 3290 mL

## 2023-03-04 NOTE — CHART NOTE - NSCHARTNOTEFT_GEN_A_CORE
Incidental findings are findings on history, physical examination, lab work, and imaging that are not directly related to the patient's chief complaint and cause for hospital admission.     1. The incidental findings for this patient are (please list): Evidence of a few bilateral lung nodules. Dominant nodule measures 6 mm and is located in the right middle lobe       2. Were these findings explained to the patient and/or their family (in the event that either the patient requests communication with their family or the patient is unable to understand or remember the findings and plan)? YES      3. Was a copy of the lab work/ imaging report given to the patient and/or their family? YES    4. Was the patient asked whether they can follow up with their PMD regarding this finding? If the patient says no, or does not have a PMD, you must identify a provider (i.e. Medicine Clinic or specialist) with whom the patient will follow up.    5. Was the finding documented on the discharge summary? Will be at the time of discharge      Josee Das MD, PGY2  SICU  [id number]

## 2023-03-04 NOTE — PROGRESS NOTE ADULT - ASSESSMENT
ASSESSMENT: 43yo G0 LMP 3/1 with no pertinent PMHx presented 3/3 to OSH w/ acute worsening of LLQ pain that x 1 month. CT shows L adnexal mass measuring 9 cm with infiltration of fat adjacent to the mass. Here patient tachycardic, hypotensive to 80s/50s despite 3L IVF, with leukocytosis to 17 on labs. Admitted to SICU for close hemodynamic monitoring.     PLAN:   Neurologic:  - AO x 3  - Pain control with PRN Oxycodone, Dilaudid for breakthrough pain  - IV Tylenol standing    Respiratory:  - No acute issues  - Incentive spirometry    Cardiovascular:  - Sinus tachycardia, likely multifactorial. Will continue to monitor  - BP remains stable, SBP in 90s  - q1 hour VS  - LA negative, continue to monitor daily    Gastrointestinal/Nutrition:  - NPO except meds, will discuss advancing diet     Genitourinary/Renal:  - Monitor strict I/Os  - Supplement electrolytes aggressively, repeat at midnight    Hematologic:  - INR is elevated to 2.29, ?unclear etiology. Liver fn okay. Not on AC at home.   - Hold chemical VTE ppx while INR elevated  - Mechanical VTE ppx with ICDs    Infectious Disease:  - Leukocytosis improving  - Continue cefotetan 2g [id number] (3/3-?), Flagyl 500mg [id number] (3/3-?), doxycycline 100mg q12 hours (3/3-?)    Endocrine:  - No acute issues  - Euglycemic    GYN:  - MRI abdomen/ pelvis done, pending read   - No plan for immediate IR intervention, will discuss plan once MR completed   - IV abx as above     Disposition: SICU

## 2023-03-04 NOTE — CHART NOTE - NSCHARTNOTEFT_GEN_A_CORE
R2 GYN SICU Rounding Note:     S: Patient resting comfortably in bed at the time of evaluation in no acute distress.     O:  T(C): 37 (03-04-23 @ 01:00), Max: 38 (03-03-23 @ 11:30)  HR: 97 (03-04-23 @ 02:00) (97 - 124)  BP: 89/56 (03-04-23 @ 02:00) (83/58 - 100/67)  RR: 18 (03-04-23 @ 02:00) (18 - 37)  SpO2: 97% (03-04-23 @ 02:00) (94% - 100%)                        8.6    14.94 )-----------( 260      ( 03 Mar 2023 19:21 )             27.5     03-03    137  |  106  |  10  ----------------------------<  109<H>  3.5   |  20<L>  |  0.51    Ca    8.4      03 Mar 2023 19:21  Phos  1.5     03-03  Mg     1.7     03-03    TPro  6.1  /  Alb  3.0<L>  /  TBili  0.5  /  DBili  x   /  AST  10  /  ALT  11  /  AlkPhos  80  03-03    LIVER FUNCTIONS - ( 03 Mar 2023 19:21 )  Alb: 3.0 g/dL / Pro: 6.1 g/dL / ALK PHOS: 80 U/L / ALT: 11 U/L / AST: 10 U/L / GGT: x           PT/INR - ( 03 Mar 2023 19:21 )   PT: 24.2 sec;   INR: 2.07 ratio         PTT - ( 03 Mar 2023 19:21 )  PTT:33.9 sec    Gen: NAD; A&Ox3; resting comfortably in bed    A/P: 43yo G0 LMP 3/1 presented w/ acute worsening of LLQ, tvus w/ enlarged left ovary with complex cystic lesion, likely a hemorrhagic cyst vs ovarian cystic neoplasm cannot be excluded. VS significant for hypotension and tachycardia upon admission now resolved. Leukocytosis downtrending to 14. Clinically patient appears stable at this time. Patient under SICU care given initial presentation and concern for sepsis.     -Appreciate excellent SICU Care.   - Continue IV antibiotics: cefotetan qD, doxy, flagy BID  -Syphilis, Hep A, Hep B and Hep C serologies neg  - f/u Urine GC/Chlamydia  - f/u BCx x2, UCx; lactate wnl, coags elevated. will repeat PRN  - AM CBC, BMP, Coags  - Pain controlled with current regimen  - Hold HSQ given possible IR intervention    Patient seen and discussed with Dr. White,     Elena Pitts MD, PGY2 R2 GYN SICU Rounding Note:     S: Patient resting comfortably in bed at the time of evaluation in no acute distress.     O:  T(C): 37 (03-04-23 @ 01:00), Max: 38 (03-03-23 @ 11:30)  HR: 97 (03-04-23 @ 02:00) (97 - 124)  BP: 89/56 (03-04-23 @ 02:00) (83/58 - 100/67)  RR: 18 (03-04-23 @ 02:00) (18 - 37)  SpO2: 97% (03-04-23 @ 02:00) (94% - 100%)                        8.6    14.94 )-----------( 260      ( 03 Mar 2023 19:21 )             27.5     03-03    137  |  106  |  10  ----------------------------<  109<H>  3.5   |  20<L>  |  0.51    Ca    8.4      03 Mar 2023 19:21  Phos  1.5     03-03  Mg     1.7     03-03    TPro  6.1  /  Alb  3.0<L>  /  TBili  0.5  /  DBili  x   /  AST  10  /  ALT  11  /  AlkPhos  80  03-03    LIVER FUNCTIONS - ( 03 Mar 2023 19:21 )  Alb: 3.0 g/dL / Pro: 6.1 g/dL / ALK PHOS: 80 U/L / ALT: 11 U/L / AST: 10 U/L / GGT: x           PT/INR - ( 03 Mar 2023 19:21 )   PT: 24.2 sec;   INR: 2.07 ratio         PTT - ( 03 Mar 2023 19:21 )  PTT:33.9 sec    Gen: NAD; A&Ox3; resting comfortably in bed    A/P: 41yo G0 LMP 3/1 presented w/ acute worsening of LLQ, tvus w/ enlarged left ovary with complex cystic lesion, likely a hemorrhagic cyst vs ovarian cystic neoplasm cannot be excluded. VS significant for hypotension and tachycardia upon admission now resolved. Leukocytosis downtrending to 14. Clinically patient appears stable at this time. Patient under SICU care given initial presentation and concern for sepsis. VSS at this time. Patient in no acute distress and afebrile.     -Appreciate excellent SICU Care.   - Continue IV antibiotics: cefotetan qD, doxy, flagy BID  -Syphilis, Hep A, Hep B and Hep C serologies neg  - f/u Urine GC/Chlamydia  - f/u BCx x2, UCx; lactate wnl, coags elevated. will repeat PRN  - AM CBC, BMP, Coags  - Pain controlled with current regimen  - Hold HSQ given possible IR intervention    Patient seen and discussed with Dr. White,     Elena Pitts MD, PGY2

## 2023-03-04 NOTE — PROGRESS NOTE ADULT - SUBJECTIVE AND OBJECTIVE BOX
Vascular & Interventional Radiology    Interval history: MR c/w TOA. WBC increased today.    Data:  T(C): 36.4  HR: 91  BP: 85/54  RR: 19  SpO2: 96%    -WBC 17.12 / HgB 8.2 / Hct 26.9 / Plt 263  -Na 138 / Cl 106 / BUN 7 / Glucose 133  -K 3.6 / CO2 20 / Cr 0.53  -ALT 9 / Alk Phos 78 / T.Bili 0.3  -INR1.83      Assessment:  42y Female w/ L TOA.    Plan:   - Will plan for drainage Perry 3/5.  - Continue IV abx. Trend WBC.  - INR downtrending but still elevated. Trend.  - NPOpMN.  - Place IR procedure order under Dr. Morales.      --  Gustavo Zavala MD, PGY-6  Vascular and Interventional Radiology  Available on [employer] Teams    - Nonemergent consults:  [facility] order "Consult- Interventional Radiology"  - Emergent issues (pager): [facility] [phone]; [facility] [phone]; [phone]  - Scheduling questions: [facility] [phone]; [facility] [phone]  - Clinic/outpatient booking: [facility] [phone]; [facility] [phone]    Vascular & Interventional Radiology    Interval history: MR c/w TOA. WBC increased today.    Data:  T(C): 36.4  HR: 91  BP: 85/54  RR: 19  SpO2: 96%    -WBC 17.12 / HgB 8.2 / Hct 26.9 / Plt 263  -Na 138 / Cl 106 / BUN 7 / Glucose 133  -K 3.6 / CO2 20 / Cr 0.53  -ALT 9 / Alk Phos 78 / T.Bili 0.3  -INR1.83      Assessment:  42y Female w/ L TOA.    Plan:   - Will plan for drainage Perry 3/5.  - Continue IV abx. Trend WBC.  - INR downtrending but still elevated. Trend.  - NPOpMN.  - If patient decompensates, page for reevaluation.   - Place IR procedure order under Dr. Morales.      --  Gustavo Zavala MD, PGY-6  Vascular and Interventional Radiology  Available on [employer] Teams    - Nonemergent consults:  [facility] order "Consult- Interventional Radiology"  - Emergent issues (pager): [facility] [phone]; [facility] [phone]; [phone]  - Scheduling questions: [facility] [phone]; [facility] [phone]  - Clinic/outpatient booking: [facility] [phone]; [facility] [phone]

## 2023-03-04 NOTE — PROGRESS NOTE ADULT - ASSESSMENT
41yo G0 LMP 3/1 presented w/ acute worsening of LLQ, tvus w/ enlarged left ovary with complex cystic lesion, likely a hemorrhagic cyst vs ovarian cystic neoplasm cannot be excluded. VS significant for hypotension and tachycardia upon admission now resolved. Leukocytosis downtrending to 14. Clinically patient appears stable at this time. Patient under SICU care given initial presentation and concern for sepsis. VSS at this time. Patient in no acute distress and afebrile.     Neuro: Pain well controlled. Ofirmev q6hrs and oxycodone PRN.  CV: Hemodynamically stable,  AM CBC, BMP, Coags  Pulm: Saturating well on room air, encourage oob/amb  GI: Continue regular diet  : Voiding spontaneously with minimal vaginal bleeding, continue to monitor UO.  #TOA  - CTAP @ OSH 3/2: Left adnexal mass measuring 9.0 cm with areas of higher density and septations peripherally. Infiltration of the fat adjacent to the mass. Multiple lymph nodes adjacent to the mass including a representative lymph node measuring 8 mm.  - TVUS 3/3: Enlarged left ovary with 7.6 x 4.7 x 5.8 cm cystic structure and central soft tissue components; likely a hemorrhagic cyst. Ovarian cystic neoplasm cannot be excluded. Small volume free fluid in cul-de-sac.  - w/q IV antibiotics: cefotetan qD, doxy, flagy BID (3/3- ); last febrile at 3/4 @ 23:00 38.0 *C  - IR recs: MR abd/pelv to assess for potential for drainage; f/u final recs  - f/u MRI read (3/3)  -Syphilis, Hep A, Hep B and Hep C serologies neg (3/3)  - f/u Urine GC/Chlamydia  - f/u BCx x2, UCx; lactate wnl, coags elevated. will repeat PRN  Heme: Ambulation and SCDs for DVT ppx  - Hold HSQ given possible IR intervention  FEN: LR@100  - s/p 3L LR in ED due to hypotension/tachycardia  ID: Afebrile  Endo: No active issues   Dispo: Appreciate excellent SICU Care.       Amyeo Afroz Jereen, PGY-2   41yo G0 LMP 3/1 HD#2 for treatment of TOA. Patient p/w acute worsening of LLQ, tvus w/ enlarged left ovary with complex cystic lesion, likely a hemorrhagic cyst vs ovarian cystic neoplasm cannot be excluded. VS on admission significant for hypotension and tachycardia now resolving. Leukocytosis downtrending to from 17 to 14. Clinically patient appears stable at this time. Patient under SICU care given initial presentation and concern for sepsis. VSS at this time. Patient in no acute distress and afebrile.     Neuro: Pain well controlled. Ofirmev q6hrs and oxycodone PRN.  CV: Hemodynamically stable,  AM CBC, BMP, Coags  Pulm: Saturating well on room air, encourage oob/amb  GI: Continue regular diet  : Voiding spontaneously with minimal vaginal bleeding, continue to monitor UO.  #TOA  - CTAP @ OSH 3/2: Left adnexal mass measuring 9.0 cm with areas of higher density and septations peripherally. Infiltration of the fat adjacent to the mass. Multiple lymph nodes adjacent to the mass including a representative lymph node measuring 8 mm.  - TVUS 3/3: Enlarged left ovary with 7.6 x 4.7 x 5.8 cm cystic structure and central soft tissue components; likely a hemorrhagic cyst. Ovarian cystic neoplasm cannot be excluded. Small volume free fluid in cul-de-sac.  - w/q IV antibiotics: cefotetan qD, doxy, flagy BID (3/3- ); last febrile at 3/4 @ 23:00 38.0 *C  - IR recs: MR abd/pelv to assess for potential for drainage; f/u final recs  - f/u MRI read (3/3)  -Syphilis, Hep A, Hep B and Hep C serologies neg (3/3)  - f/u Urine GC/Chlamydia  - f/u BCx x2, UCx; lactate wnl, coags elevated. will repeat PRN  Heme: Ambulation and SCDs for DVT ppx  - Hold HSQ given possible IR intervention  FEN: LR@100  - s/p 3L LR in ED due to hypotension/tachycardia  ID: Afebrile  Endo: No active issues   Dispo: Appreciate excellent SICU Care.       Amyeo Afroz Jereen, PGY-2   41yo G0 LMP 3/1 HD#2 for treatment of TOA. Patient p/w acute worsening of LLQ, tvus w/ enlarged left ovary with complex cystic lesion, likely a hemorrhagic cyst vs ovarian cystic neoplasm cannot be excluded. VS on admission significant for hypotension and tachycardia now resolving. Leukocytosis downtrending to from 17 to 14. Clinically patient appears stable at this time. Patient under SICU care given initial presentation and concern for sepsis. VSS at this time. Patient in no acute distress and afebrile.     Neuro: Pain well controlled. Ofirmev q6hrs and oxycodone PRN.  CV: Hemodynamically stable,  AM CBC, BMP, Coags  Pulm: Saturating well on room air, encourage oob/amb  GI: Continue regular diet  : Voiding spontaneously with minimal vaginal bleeding, continue to monitor UO.  #TOA  - CTAP @ OSH 3/2: Left adnexal mass measuring 9.0 cm with areas of higher density and septations peripherally. Infiltration of the fat adjacent to the mass. Multiple lymph nodes adjacent to the mass including a representative lymph node measuring 8 mm.  - TVUS 3/3: Enlarged left ovary with 7.6 x 4.7 x 5.8 cm cystic structure and central soft tissue components; likely a hemorrhagic cyst. Ovarian cystic neoplasm cannot be excluded. Small volume free fluid in cul-de-sac.  - w/q IV antibiotics: cefotetan qD, doxy, flagy BID (3/3- ); last febrile at 3/3 @ 23:00 38.0 *C  - IR recs: MR abd/pelv to assess for potential for drainage; f/u final recs  - f/u MRI read (3/3)  -Syphilis, Hep A, Hep B and Hep C serologies neg (3/3)  - f/u Urine GC/Chlamydia  - f/u BCx x2, UCx; lactate wnl, coags elevated. will repeat PRN  Heme: Ambulation and SCDs for DVT ppx  - Hold HSQ given possible IR intervention  FEN: LR@100  - s/p 3L LR in ED due to hypotension/tachycardia  ID: Afebrile  Endo: No active issues   Dispo: Appreciate excellent SICU Care.       Amyeo Afroz Jereen, PGY-2

## 2023-03-05 LAB
ALBUMIN SERPL ELPH-MCNC: 2.4 G/DL — LOW (ref 3.3–5)
ALBUMIN SERPL ELPH-MCNC: 2.7 G/DL — LOW (ref 3.3–5)
ALP SERPL-CCNC: 79 U/L — SIGNIFICANT CHANGE UP (ref 40–120)
ALP SERPL-CCNC: 85 U/L — SIGNIFICANT CHANGE UP (ref 40–120)
ALT FLD-CCNC: 6 U/L — LOW (ref 10–45)
ALT FLD-CCNC: 7 U/L — LOW (ref 10–45)
ANION GAP SERPL CALC-SCNC: 13 MMOL/L — SIGNIFICANT CHANGE UP (ref 5–17)
ANION GAP SERPL CALC-SCNC: 14 MMOL/L — SIGNIFICANT CHANGE UP (ref 5–17)
APTT BLD: 30 SEC — SIGNIFICANT CHANGE UP (ref 27.5–35.5)
APTT BLD: 33.7 SEC — SIGNIFICANT CHANGE UP (ref 27.5–35.5)
AST SERPL-CCNC: 6 U/L — LOW (ref 10–40)
AST SERPL-CCNC: 6 U/L — LOW (ref 10–40)
BILIRUB SERPL-MCNC: 0.2 MG/DL — SIGNIFICANT CHANGE UP (ref 0.2–1.2)
BILIRUB SERPL-MCNC: 0.2 MG/DL — SIGNIFICANT CHANGE UP (ref 0.2–1.2)
BUN SERPL-MCNC: 7 MG/DL — SIGNIFICANT CHANGE UP (ref 7–23)
BUN SERPL-MCNC: 7 MG/DL — SIGNIFICANT CHANGE UP (ref 7–23)
CALCIUM SERPL-MCNC: 8 MG/DL — LOW (ref 8.4–10.5)
CALCIUM SERPL-MCNC: 8.4 MG/DL — SIGNIFICANT CHANGE UP (ref 8.4–10.5)
CHLORIDE SERPL-SCNC: 108 MMOL/L — SIGNIFICANT CHANGE UP (ref 96–108)
CHLORIDE SERPL-SCNC: 108 MMOL/L — SIGNIFICANT CHANGE UP (ref 96–108)
CO2 SERPL-SCNC: 18 MMOL/L — LOW (ref 22–31)
CO2 SERPL-SCNC: 19 MMOL/L — LOW (ref 22–31)
CREAT SERPL-MCNC: 0.42 MG/DL — LOW (ref 0.5–1.3)
CREAT SERPL-MCNC: 0.43 MG/DL — LOW (ref 0.5–1.3)
EGFR: 125 ML/MIN/1.73M2 — SIGNIFICANT CHANGE UP
EGFR: 126 ML/MIN/1.73M2 — SIGNIFICANT CHANGE UP
GLUCOSE SERPL-MCNC: 86 MG/DL — SIGNIFICANT CHANGE UP (ref 70–99)
GLUCOSE SERPL-MCNC: 96 MG/DL — SIGNIFICANT CHANGE UP (ref 70–99)
GRAM STN FLD: SIGNIFICANT CHANGE UP
HCT VFR BLD CALC: 24.4 % — LOW (ref 34.5–45)
HCT VFR BLD CALC: 25.7 % — LOW (ref 34.5–45)
HGB BLD-MCNC: 7.6 G/DL — LOW (ref 11.5–15.5)
HGB BLD-MCNC: 7.9 G/DL — LOW (ref 11.5–15.5)
INR BLD: 1.66 RATIO — HIGH (ref 0.88–1.16)
INR BLD: 1.77 RATIO — HIGH (ref 0.88–1.16)
MAGNESIUM SERPL-MCNC: 1.8 MG/DL — SIGNIFICANT CHANGE UP (ref 1.6–2.6)
MAGNESIUM SERPL-MCNC: 2 MG/DL — SIGNIFICANT CHANGE UP (ref 1.6–2.6)
MCHC RBC-ENTMCNC: 22.8 PG — LOW (ref 27–34)
MCHC RBC-ENTMCNC: 22.8 PG — LOW (ref 27–34)
MCHC RBC-ENTMCNC: 30.7 GM/DL — LOW (ref 32–36)
MCHC RBC-ENTMCNC: 31.1 GM/DL — LOW (ref 32–36)
MCV RBC AUTO: 73.1 FL — LOW (ref 80–100)
MCV RBC AUTO: 74.1 FL — LOW (ref 80–100)
NRBC # BLD: 0 /100 WBCS — SIGNIFICANT CHANGE UP (ref 0–0)
NRBC # BLD: 0 /100 WBCS — SIGNIFICANT CHANGE UP (ref 0–0)
PHOSPHATE SERPL-MCNC: 1.8 MG/DL — LOW (ref 2.5–4.5)
PHOSPHATE SERPL-MCNC: 3.2 MG/DL — SIGNIFICANT CHANGE UP (ref 2.5–4.5)
PLATELET # BLD AUTO: 296 K/UL — SIGNIFICANT CHANGE UP (ref 150–400)
PLATELET # BLD AUTO: 302 K/UL — SIGNIFICANT CHANGE UP (ref 150–400)
POTASSIUM SERPL-MCNC: 3.4 MMOL/L — LOW (ref 3.5–5.3)
POTASSIUM SERPL-MCNC: 3.9 MMOL/L — SIGNIFICANT CHANGE UP (ref 3.5–5.3)
POTASSIUM SERPL-SCNC: 3.4 MMOL/L — LOW (ref 3.5–5.3)
POTASSIUM SERPL-SCNC: 3.9 MMOL/L — SIGNIFICANT CHANGE UP (ref 3.5–5.3)
PROCALCITONIN SERPL-MCNC: 5.81 NG/ML — HIGH (ref 0.02–0.1)
PROCALCITONIN SERPL-MCNC: 8.25 NG/ML — HIGH (ref 0.02–0.1)
PROT SERPL-MCNC: 5.1 G/DL — LOW (ref 6–8.3)
PROT SERPL-MCNC: 5.6 G/DL — LOW (ref 6–8.3)
PROTHROM AB SERPL-ACNC: 19.2 SEC — HIGH (ref 10.5–13.4)
PROTHROM AB SERPL-ACNC: 20.7 SEC — HIGH (ref 10.5–13.4)
RBC # BLD: 3.34 M/UL — LOW (ref 3.8–5.2)
RBC # BLD: 3.47 M/UL — LOW (ref 3.8–5.2)
RBC # FLD: 16 % — HIGH (ref 10.3–14.5)
RBC # FLD: 16.1 % — HIGH (ref 10.3–14.5)
SARS-COV-2 RNA SPEC QL NAA+PROBE: SIGNIFICANT CHANGE UP
SODIUM SERPL-SCNC: 140 MMOL/L — SIGNIFICANT CHANGE UP (ref 135–145)
SODIUM SERPL-SCNC: 140 MMOL/L — SIGNIFICANT CHANGE UP (ref 135–145)
SPECIMEN SOURCE: SIGNIFICANT CHANGE UP
WBC # BLD: 14.95 K/UL — HIGH (ref 3.8–10.5)
WBC # BLD: 15.75 K/UL — HIGH (ref 3.8–10.5)
WBC # FLD AUTO: 14.95 K/UL — HIGH (ref 3.8–10.5)
WBC # FLD AUTO: 15.75 K/UL — HIGH (ref 3.8–10.5)

## 2023-03-05 PROCEDURE — 49406 IMAGE CATH FLUID PERI/RETRO: CPT

## 2023-03-05 PROCEDURE — 99232 SBSQ HOSP IP/OBS MODERATE 35: CPT

## 2023-03-05 RX ORDER — MAGNESIUM SULFATE 500 MG/ML
2 VIAL (ML) INJECTION ONCE
Refills: 0 | Status: COMPLETED | OUTPATIENT
Start: 2023-03-05 | End: 2023-03-05

## 2023-03-05 RX ORDER — TRAMADOL HYDROCHLORIDE 50 MG/1
25 TABLET ORAL ONCE
Refills: 0 | Status: DISCONTINUED | OUTPATIENT
Start: 2023-03-05 | End: 2023-03-05

## 2023-03-05 RX ORDER — DIPHENHYDRAMINE HCL 50 MG
25 CAPSULE ORAL ONCE
Refills: 0 | Status: COMPLETED | OUTPATIENT
Start: 2023-03-05 | End: 2023-03-05

## 2023-03-05 RX ORDER — POTASSIUM CHLORIDE 20 MEQ
40 PACKET (EA) ORAL ONCE
Refills: 0 | Status: COMPLETED | OUTPATIENT
Start: 2023-03-05 | End: 2023-03-05

## 2023-03-05 RX ORDER — POTASSIUM PHOSPHATE, MONOBASIC POTASSIUM PHOSPHATE, DIBASIC 236; 224 MG/ML; MG/ML
30 INJECTION, SOLUTION INTRAVENOUS ONCE
Refills: 0 | Status: COMPLETED | OUTPATIENT
Start: 2023-03-05 | End: 2023-03-05

## 2023-03-05 RX ORDER — ACETAMINOPHEN 500 MG
650 TABLET ORAL EVERY 6 HOURS
Refills: 0 | Status: DISCONTINUED | OUTPATIENT
Start: 2023-03-05 | End: 2023-03-09

## 2023-03-05 RX ORDER — HEPARIN SODIUM 5000 [USP'U]/ML
5000 INJECTION INTRAVENOUS; SUBCUTANEOUS EVERY 12 HOURS
Refills: 0 | Status: DISCONTINUED | OUTPATIENT
Start: 2023-03-05 | End: 2023-03-05

## 2023-03-05 RX ORDER — LACTOBACILLUS ACIDOPHILUS 100MM CELL
1 CAPSULE ORAL DAILY
Refills: 0 | Status: DISCONTINUED | OUTPATIENT
Start: 2023-03-05 | End: 2023-03-09

## 2023-03-05 RX ADMIN — TRAMADOL HYDROCHLORIDE 25 MILLIGRAM(S): 50 TABLET ORAL at 12:58

## 2023-03-05 RX ADMIN — HYDROMORPHONE HYDROCHLORIDE 0.25 MILLIGRAM(S): 2 INJECTION INTRAMUSCULAR; INTRAVENOUS; SUBCUTANEOUS at 01:17

## 2023-03-05 RX ADMIN — TRAMADOL HYDROCHLORIDE 25 MILLIGRAM(S): 50 TABLET ORAL at 08:45

## 2023-03-05 RX ADMIN — Medication 100 MILLIGRAM(S): at 14:51

## 2023-03-05 RX ADMIN — Medication 100 MILLIGRAM(S): at 06:43

## 2023-03-05 RX ADMIN — Medication 650 MILLIGRAM(S): at 18:00

## 2023-03-05 RX ADMIN — TRAMADOL HYDROCHLORIDE 25 MILLIGRAM(S): 50 TABLET ORAL at 13:30

## 2023-03-05 RX ADMIN — Medication 255 MILLIMOLE(S): at 04:51

## 2023-03-05 RX ADMIN — Medication 650 MILLIGRAM(S): at 17:23

## 2023-03-05 RX ADMIN — SODIUM CHLORIDE 100 MILLILITER(S): 9 INJECTION, SOLUTION INTRAVENOUS at 08:15

## 2023-03-05 RX ADMIN — Medication 100 MILLIGRAM(S): at 22:14

## 2023-03-05 RX ADMIN — HYDROMORPHONE HYDROCHLORIDE 0.25 MILLIGRAM(S): 2 INJECTION INTRAMUSCULAR; INTRAVENOUS; SUBCUTANEOUS at 08:58

## 2023-03-05 RX ADMIN — SODIUM CHLORIDE 100 MILLILITER(S): 9 INJECTION, SOLUTION INTRAVENOUS at 04:36

## 2023-03-05 RX ADMIN — HYDROMORPHONE HYDROCHLORIDE 0.25 MILLIGRAM(S): 2 INJECTION INTRAMUSCULAR; INTRAVENOUS; SUBCUTANEOUS at 09:30

## 2023-03-05 RX ADMIN — Medication 100 MILLIGRAM(S): at 05:34

## 2023-03-05 RX ADMIN — Medication 25 GRAM(S): at 17:18

## 2023-03-05 RX ADMIN — TRAMADOL HYDROCHLORIDE 25 MILLIGRAM(S): 50 TABLET ORAL at 08:15

## 2023-03-05 RX ADMIN — Medication 100 GRAM(S): at 05:02

## 2023-03-05 RX ADMIN — SODIUM CHLORIDE 100 MILLILITER(S): 9 INJECTION, SOLUTION INTRAVENOUS at 02:14

## 2023-03-05 RX ADMIN — TRAMADOL HYDROCHLORIDE 25 MILLIGRAM(S): 50 TABLET ORAL at 19:52

## 2023-03-05 RX ADMIN — Medication 25 MILLIGRAM(S): at 22:27

## 2023-03-05 RX ADMIN — POTASSIUM PHOSPHATE, MONOBASIC POTASSIUM PHOSPHATE, DIBASIC 83.33 MILLIMOLE(S): 236; 224 INJECTION, SOLUTION INTRAVENOUS at 06:06

## 2023-03-05 RX ADMIN — TRAMADOL HYDROCHLORIDE 25 MILLIGRAM(S): 50 TABLET ORAL at 20:50

## 2023-03-05 RX ADMIN — CHLORHEXIDINE GLUCONATE 1 APPLICATION(S): 213 SOLUTION TOPICAL at 12:29

## 2023-03-05 RX ADMIN — HYDROMORPHONE HYDROCHLORIDE 0.25 MILLIGRAM(S): 2 INJECTION INTRAMUSCULAR; INTRAVENOUS; SUBCUTANEOUS at 01:32

## 2023-03-05 RX ADMIN — Medication 30 MILLILITER(S): at 12:57

## 2023-03-05 RX ADMIN — Medication 40 MILLIEQUIVALENT(S): at 04:42

## 2023-03-05 RX ADMIN — Medication 100 MILLIGRAM(S): at 19:17

## 2023-03-05 RX ADMIN — Medication 100 GRAM(S): at 21:27

## 2023-03-05 RX ADMIN — Medication 1 TABLET(S): at 12:30

## 2023-03-05 NOTE — PRE PROCEDURE NOTE - PRE PROCEDURE EVALUATION
Vascular & Interventional Radiology    HPI: 42y Female with L TOA and leukocytosis.    Allergies: oxycodone (Pruritus)    Medications (Abx/Cardiac/Anticoagulation/Blood Products)  cefoTEtan  IVPB: 100 mL/Hr IV Intermittent (03-05 @ 05:02)  cefoTEtan  IVPB: 100 mL/Hr IV Intermittent (03-03 @ 13:51)  doxycycline IVPB: 100 mL/Hr IV Intermittent (03-05 @ 06:43)  doxycycline IVPB: 100 mL/Hr IV Intermittent (03-03 @ 15:33)  metroNIDAZOLE  IVPB: 100 mL/Hr IV Intermittent (03-05 @ 05:34)  metroNIDAZOLE  IVPB: 100 mL/Hr IV Intermittent (03-03 @ 16:41)    Data:  T(C): 37.2  HR: 111  BP: 109/63  RR: 28  SpO2: 90%    Exam  Calm, resting comfortably, NAD, normal respirations.    -WBC 15.75 / HgB 7.9 / Hct 25.7 / Plt 296  -Na 140 / Cl 108 / BUN 7 / Glucose 96  -K 3.4 / CO2 18 / Cr 0.43  -ALT 7 / Alk Phos 85 / T.Bili 0.2  -INR1.66    Plan:   - 42y Female presents for L TOA drainage.  - Informed consent obtained.

## 2023-03-05 NOTE — PROGRESS NOTE ADULT - ASSESSMENT
41yo G0 LMP 3/1 HD#3 for treatment of TOA. Patient p/w acute worsening of LLQ, tvus w/ enlarged left ovary with complex cystic lesion. Hypotensive and tachycardic upon admission, requiring SICU care. No improvement in leukocytosis (17->14->17) with plan for IR intervention. Clinically patient appears stable at this time. Patient under SICU care given initial presentation and concern for sepsis. VSS at this time. Patient in no acute distress and afebrile.     Neuro: Pain well controlled. Ofirmev q6hrs and tramadol PRN, Dilaudid PRN (while NPO).  CV: Hemodynamically stable,  AM CBC, BMP, Coags  - fall precautions/ bedrest order by SICU team due to hypotention into 80s/50s  Pulm: Saturating well on room air, encourage oob/amb  GI: NPO for IR procedure   : Voiding spontaneously with minimal vaginal bleeding, continue to monitor UO.  #TOA  - CTAP @ OSH 3/2: Left adnexal mass measuring 9.0 cm with areas of higher density and septations peripherally. Infiltration of the fat adjacent to the mass. Multiple lymph nodes adjacent to the mass including a representative lymph node measuring 8 mm.  - TVUS 3/3: Enlarged left ovary with 7.6 x 4.7 x 5.8 cm cystic structure and central soft tissue components; likely a hemorrhagic cyst. Ovarian cystic neoplasm cannot be excluded. Small volume free fluid in cul-de-sac.  - w/q IV antibiotics: cefotetan qD, doxy, flagy BID (3/3- ); last febrile at 3/4 @ 23:00 38.0 *C  - IR recs: drainage 3/5/23 at 2pm  - MRI (3/3): 9cm left tubo ovarian abscess. No acute abdominal findings.   -Syphilis, Hep A, Hep B and Hep C serologies neg (3/3), GC/CT negative   - f/u BCx x2, UCx; lactate wnl, coags elevated. will repeat PRN  Heme: Ambulation and SCDs for DVT ppx  - Hold HSQ given plan for IR drainage  FEN: LR@100  - s/p 3L LR in ED due to hypotension/tachycardia  ID: Afebrile  Endo: No active issues   Dispo: Appreciate excellent SICU Care    Amyeo Afroz Jereen, PGY-2   41yo G0 LMP 3/1 HD#3 for treatment of TOA. Patient p/w acute worsening of LLQ, tvus w/ enlarged left ovary with complex cystic lesion. Hypotensive and tachycardic upon admission, requiring SICU care. No improvement in leukocytosis (17->14->17) with plan for IR intervention. Clinically patient appears stable at this time. Patient under SICU care given initial presentation and concern for sepsis. VSS at this time. Patient in no acute distress and afebrile.     Neuro: Pain well controlled. Ofirmev q6hrs and tramadol PRN, Dilaudid PRN (while NPO).  CV: Hemodynamically stable,  AM CBC, BMP, Coags  - fall precautions/ bedrest order by SICU team due to hypotention into 80s/50s  Pulm: Saturating well on room air, encourage oob/amb  GI: NPO for IR procedure   : Voiding spontaneously with minimal vaginal bleeding, continue to monitor UO.  #TOA  - CTAP @ OSH 3/2: Left adnexal mass measuring 9.0 cm with areas of higher density and septations peripherally. Infiltration of the fat adjacent to the mass. Multiple lymph nodes adjacent to the mass including a representative lymph node measuring 8 mm.  - TVUS 3/3: Enlarged left ovary with 7.6 x 4.7 x 5.8 cm cystic structure and central soft tissue components; likely a hemorrhagic cyst. Ovarian cystic neoplasm cannot be excluded. Small volume free fluid in cul-de-sac.  - w/q IV antibiotics: cefotetan qD, doxy, flagy BID (3/3- ); last febrile at 3/3 @ 23:00 38.0 *C  - IR recs: drainage 3/5/23 at 2pm  - MRI (3/3): 9cm left tubo ovarian abscess. No acute abdominal findings.   -Syphilis, Hep A, Hep B and Hep C serologies neg (3/3), GC/CT negative   - f/u BCx x2, UCx; lactate wnl, coags elevated. will repeat PRN  Heme: Ambulation and SCDs for DVT ppx  - Hold HSQ given plan for IR drainage  FEN: LR@100  - s/p 3L LR in ED due to hypotension/tachycardia  ID: Afebrile  Endo: No active issues   Dispo: Appreciate excellent SICU Care    Amyeo Afroz Jereen, PGY-2

## 2023-03-05 NOTE — PROGRESS NOTE ADULT - SUBJECTIVE AND OBJECTIVE BOX
GYN Progress Note HD#3    Pt seen and examined at bedside in NAD. Abdominal pain controlled with Dilaudid. Intermittent spasms/cramping pains. Abx giving pt diarrhea but reports probiotics helping.  Denies shortness of breath w/ discomfort while breathing due to fullness in belly. Patient denies lightheadedness chest pain, fevers, chills, diarrhea.    Vital Signs Last 24 Hrs  T(F): 99.3 (05 Mar 2023 03:00), Max: 99.3 (05 Mar 2023 03:00)  HR: 104 (05 Mar 2023 07:00) (90 - 112)  BP: 98/59 (05 Mar 2023 07:00) (83/53 - 108/72)  RR: 29 (05 Mar 2023 07:00) (19 - 31)  SpO2: 94% (05 Mar 2023 07:00) (91% - 99%)      Gen: alert, oriented  CVS: RRR  Lungs: CTAB  Abdomen: Soft, mildy tender, non distended at areas where mass isn't present near epigastric region with mass edge palpated 3cm above umbilicus    MEDICATIONS  (STANDING):  cefoTEtan  IVPB 2 Gram(s) IV Intermittent every 12 hours  chlorhexidine 2% Cloths 1 Application(s) Topical daily  doxycycline IVPB 100 milliGRAM(s) IV Intermittent every 12 hours  lactated ringers. 1000 milliLiter(s) (100 mL/Hr) IV Continuous <Continuous>  lactobacillus acidophilus 1 Tablet(s) Oral daily  metroNIDAZOLE  IVPB 500 milliGRAM(s) IV Intermittent every 8 hours    MEDICATIONS  (PRN):  HYDROmorphone  Injectable 0.25 milliGRAM(s) IV Push every 3 hours PRN breakthrough pain  HYDROmorphone  Injectable 0.25 milliGRAM(s) IV Push every 3 hours PRN Severe Pain (7 - 10)  traMADol 25 milliGRAM(s) Oral every 6 hours PRN Moderate Pain (4 - 6)      LABS:                        7.9    15.75 )-----------( 296      ( 05 Mar 2023 00:48 )             25.7                         8.2    17.12 )-----------( 263      ( 04 Mar 2023 04:24 )             26.9                         8.6    14.94 )-----------( 260      ( 03 Mar 2023 19:21 )             27.5                         9.2    17.58 )-----------( 317      ( 03 Mar 2023 12:05 )             29.7     Magnesium, Serum: 2.0 mg/dL (03-05 @ 00:48)    03-05-23 @ 00:48      140  |  108  |  7   ----------------------------<  96  3.4<L>   |  18<L>  |  0.43<L>    03-04-23 @ 04:24      138  |  106  |  7   ----------------------------<  133<H>  3.6   |  20<L>  |  0.53    03-03-23 @ 19:21      137  |  106  |  10  ----------------------------<  109<H>  3.5   |  20<L>  |  0.51    03-03-23 @ 12:05      134<L>  |  98  |  13  ----------------------------<  121<H>  3.4<L>   |  23  |  0.63    03-02-23 @ 13:40      135  |  100  |  9   ----------------------------<  111<H>  3.8   |  27  |  0.59        Ca    8.4      05 Mar 2023 00:48  Ca    8.4      04 Mar 2023 04:24  Ca    8.4      03 Mar 2023 19:21  Ca    9.0      03 Mar 2023 12:05  Ca    8.9      02 Mar 2023 13:40  Phos  1.8<L>     03-05  Phos  4.0     03-04  Phos  1.5<L>     03-03  Mg     2.0     03-05  Mg     2.5     03-04  Mg     1.7     03-03    TPro  5.6<L>  /  Alb  2.7<L>  /  TBili  0.2  /  DBili  x   /  AST  6<L>  /  ALT  7<L>  /  AlkPhos  85  03-05-23 @ 00:48  TPro  5.8<L>  /  Alb  2.8<L>  /  TBili  0.3  /  DBili  x   /  AST  10  /  ALT  9<L>  /  AlkPhos  78  03-04-23 @ 04:24  TPro  6.1  /  Alb  3.0<L>  /  TBili  0.5  /  DBili  x   /  AST  10  /  ALT  11  /  AlkPhos  80  03-03-23 @ 19:21  TPro  6.6  /  Alb  3.6  /  TBili  0.7  /  DBili  x   /  AST  10  /  ALT  11  /  AlkPhos  84  03-03-23 @ 12:05  TPro  6.8  /  Alb  3.4  /  TBili  0.6  /  DBili  x   /  AST  15  /  ALT  14  /  AlkPhos  96  03-02-23 @ 13:40          Culture - Blood (collected 03-03-23 @ 13:30)  Source: .Blood Blood-Peripheral  Preliminary Report (03-04-23 @ 18:02):    No growth to date.    Culture - Blood (collected 03-03-23 @ 13:00)  Source: .Blood Blood-Peripheral  Preliminary Report (03-04-23 @ 18:02):    No growth to date.          03-04-23 @ 07:01  -  03-05-23 @ 07:00  --------------------------------------------------------  IN: 4016.6 mL / OUT: 450 mL / NET: 3566.6 mL

## 2023-03-05 NOTE — CHART NOTE - NSCHARTNOTEFT_GEN_A_CORE
GYN Note HD#3     Pt seen and examined at bedside in NAD. Abdominal pain controlled with Dilaudid. Intermittent spasms/cramping pains. Abx giving pt diarrhea. Denies shortness of breath w/ discomfort whiling breathing due to fullness in belly. Patient denies lightheadedness chest pain, fevers, chills, diarrhea.      Vital Signs Last 24 Hrs  T(F): 98.1 (04 Mar 2023 03:00), Max: 100.4 (03 Mar 2023 11:30)  HR: 97 (04 Mar 2023 07:00) (90 - 124)  BP: 91/56 (04 Mar 2023 07:00) (83/58 - 100/67)  RR: 24 (04 Mar 2023 07:00) (18 - 37)  SpO2: 99% (04 Mar 2023 07:00) (94% - 100%)    Gen: alert, oriented  CVS: RRR  Lungs: CTAB  Abdomen: Soft, mildy tender, non distended at areas where mass isn't present near epigastric region with mass edge palpated 3cm above umbilicus    MEDICATIONS  (STANDING):  acetaminophen   IVPB .. 750 milliGRAM(s) IV Intermittent every 6 hours  cefoTEtan  IVPB 2 Gram(s) IV Intermittent every 12 hours  chlorhexidine 2% Cloths 1 Application(s) Topical daily  doxycycline IVPB 100 milliGRAM(s) IV Intermittent every 12 hours  lactated ringers. 1000 milliLiter(s) (100 mL/Hr) IV Continuous <Continuous>  metroNIDAZOLE  IVPB 500 milliGRAM(s) IV Intermittent every 8 hours    MEDICATIONS  (PRN):  HYDROmorphone  Injectable 0.25 milliGRAM(s) IV Push every 3 hours PRN breakthrough pain  oxyCODONE    IR 2.5 milliGRAM(s) Oral every 4 hours PRN Moderate Pain (4 - 6)  oxyCODONE    IR 5 milliGRAM(s) Oral every 4 hours PRN Severe Pain (7 - 10)      LABS:                        8.2    17.12 )-----------( 263      ( 04 Mar 2023 04:24 )             26.9                         8.6    14.94 )-----------( 260      ( 03 Mar 2023 19:21 )             27.5                         9.2    17.58 )-----------( 317      ( 03 Mar 2023 12:05 )             29.7                         10.3   8.01  )-----------( 312      ( 02 Mar 2023 13:40 )             33.1     Magnesium, Serum: 2.5 mg/dL (03-04 @ 04:24)  Magnesium, Serum: 1.7 mg/dL (03-03 @ 19:21)  ABO Interpretation: O (03-03 @ 16:47)  Rh Interpretation: Positive (03-03 @ 16:47)  ABO Interpretation: O (03-03 @ 12:14)  Rh Interpretation: Positive (03-03 @ 12:14)  Antibody Screen: Negative (03-03 @ 12:14)    03-04-23 @ 04:24      138  |  106  |  7   ----------------------------<  133<H>  3.6   |  20<L>  |  0.53    03-03-23 @ 19:21      137  |  106  |  10  ----------------------------<  109<H>  3.5   |  20<L>  |  0.51    03-03-23 @ 12:05      134<L>  |  98  |  13  ----------------------------<  121<H>  3.4<L>   |  23  |  0.63    03-02-23 @ 13:40      135  |  100  |  9   ----------------------------<  111<H>  3.8   |  27  |  0.59        Ca    8.4      04 Mar 2023 04:24  Ca    8.4      03 Mar 2023 19:21  Ca    9.0      03 Mar 2023 12:05  Ca    8.9      02 Mar 2023 13:40  Phos  4.0     03-04  Phos  1.5<L>     03-03  Mg     2.5     03-04  Mg     1.7     03-03    TPro  5.8<L>  /  Alb  2.8<L>  /  TBili  0.3  /  DBili  x   /  AST  10  /  ALT  9<L>  /  AlkPhos  78  03-04-23 @ 04:24  TPro  6.1  /  Alb  3.0<L>  /  TBili  0.5  /  DBili  x   /  AST  10  /  ALT  11  /  AlkPhos  80  03-03-23 @ 19:21  TPro  6.6  /  Alb  3.6  /  TBili  0.7  /  DBili  x   /  AST  10  /  ALT  11  /  AlkPhos  84  03-03-23 @ 12:05  TPro  6.8  /  Alb  3.4  /  TBili  0.6  /  DBili  x   /  AST  15  /  ALT  14  /  AlkPhos  96  03-02-23 @ 13:40              03-03-23 @ 07:01  -  03-04-23 @ 07:00  --------------------------------------------------------  IN: 4140 mL / OUT: 850 mL / NET: 3290 mL      43yo G0 LMP 3/1 HD#3 for treatment of TOA. Patient p/w acute worsening of LLQ, tvus w/ enlarged left ovary with complex cystic lesion. Hypotensive and tachycardic upon admission, requiring SICU care. Leukocytosis trend 17->14->17. Clinically patient appears stable at this time. Patient under SICU care given initial presentation and concern for sepsis. VSS at this time. Patient in no acute distress and afebrile.     Neuro: Pain well controlled. Ofirmev q6hrs and tramadol PRN, Dilaudid PRN (while NPO).  CV: Hemodynamically stable,  AM CBC, BMP, Coags  Pulm: Saturating well on room air, encourage oob/amb  GI: NPO for IR procedure   : Voiding spontaneously with minimal vaginal bleeding, continue to monitor UO.  #TOA  - CTAP @ OSH 3/2: Left adnexal mass measuring 9.0 cm with areas of higher density and septations peripherally. Infiltration of the fat adjacent to the mass. Multiple lymph nodes adjacent to the mass including a representative lymph node measuring 8 mm.  - TVUS 3/3: Enlarged left ovary with 7.6 x 4.7 x 5.8 cm cystic structure and central soft tissue components; likely a hemorrhagic cyst. Ovarian cystic neoplasm cannot be excluded. Small volume free fluid in cul-de-sac.  - w/q IV antibiotics: cefotetan qD, doxy, flagy BID (3/3- ); last febrile at 3/4 @ 23:00 38.0 *C  - IR recs: drainage 3/5/23 at 2pm   - MRI (3/3): 9cm left tubo ovarian abscess. No acute abdominal findings.   -Syphilis, Hep A, Hep B and Hep C serologies neg (3/3), GC/CT negative   - f/u BCx x2, UCx; lactate wnl, coags elevated. will repeat PRN  Heme: Ambulation and SCDs for DVT ppx  - Hold HSQ given IR drainage  FEN: LR@100  - s/p 3L LR in ED due to hypotension/tachycardia  ID: Afebrile  Endo: No active issues   Dispo: Appreciate excellent SICU Care    Shiloh Eaton, PGY2  Pt seen and examined with Dr. Maxwell -  GYN Note HD#3     Pt seen and examined at bedside in NAD. Abdominal pain controlled with Dilaudid. Intermittent spasms/cramping pains. Abx giving pt diarrhea. Denies shortness of breath w/ discomfort whiling breathing due to fullness in belly. Patient denies lightheadedness chest pain, fevers, chills, diarrhea.      Vital Signs Last 24 Hrs  T(F): 98.1 (04 Mar 2023 03:00), Max: 100.4 (03 Mar 2023 11:30)  HR: 97 (04 Mar 2023 07:00) (90 - 124)  BP: 91/56 (04 Mar 2023 07:00) (83/58 - 100/67)  RR: 24 (04 Mar 2023 07:00) (18 - 37)  SpO2: 99% (04 Mar 2023 07:00) (94% - 100%)    Gen: alert, oriented  CVS: RRR  Lungs: CTAB  Abdomen: Soft, mildy tender, non distended at areas where mass isn't present near epigastric region with mass edge palpated 3cm above umbilicus    MEDICATIONS  (STANDING):  acetaminophen   IVPB .. 750 milliGRAM(s) IV Intermittent every 6 hours  cefoTEtan  IVPB 2 Gram(s) IV Intermittent every 12 hours  chlorhexidine 2% Cloths 1 Application(s) Topical daily  doxycycline IVPB 100 milliGRAM(s) IV Intermittent every 12 hours  lactated ringers. 1000 milliLiter(s) (100 mL/Hr) IV Continuous <Continuous>  metroNIDAZOLE  IVPB 500 milliGRAM(s) IV Intermittent every 8 hours    MEDICATIONS  (PRN):  HYDROmorphone  Injectable 0.25 milliGRAM(s) IV Push every 3 hours PRN breakthrough pain  oxyCODONE    IR 2.5 milliGRAM(s) Oral every 4 hours PRN Moderate Pain (4 - 6)  oxyCODONE    IR 5 milliGRAM(s) Oral every 4 hours PRN Severe Pain (7 - 10)      LABS:                        8.2    17.12 )-----------( 263      ( 04 Mar 2023 04:24 )             26.9                         8.6    14.94 )-----------( 260      ( 03 Mar 2023 19:21 )             27.5                         9.2    17.58 )-----------( 317      ( 03 Mar 2023 12:05 )             29.7                         10.3   8.01  )-----------( 312      ( 02 Mar 2023 13:40 )             33.1     Magnesium, Serum: 2.5 mg/dL (03-04 @ 04:24)  Magnesium, Serum: 1.7 mg/dL (03-03 @ 19:21)  ABO Interpretation: O (03-03 @ 16:47)  Rh Interpretation: Positive (03-03 @ 16:47)  ABO Interpretation: O (03-03 @ 12:14)  Rh Interpretation: Positive (03-03 @ 12:14)  Antibody Screen: Negative (03-03 @ 12:14)    03-04-23 @ 04:24      138  |  106  |  7   ----------------------------<  133<H>  3.6   |  20<L>  |  0.53    03-03-23 @ 19:21      137  |  106  |  10  ----------------------------<  109<H>  3.5   |  20<L>  |  0.51    03-03-23 @ 12:05      134<L>  |  98  |  13  ----------------------------<  121<H>  3.4<L>   |  23  |  0.63    03-02-23 @ 13:40      135  |  100  |  9   ----------------------------<  111<H>  3.8   |  27  |  0.59        Ca    8.4      04 Mar 2023 04:24  Ca    8.4      03 Mar 2023 19:21  Ca    9.0      03 Mar 2023 12:05  Ca    8.9      02 Mar 2023 13:40  Phos  4.0     03-04  Phos  1.5<L>     03-03  Mg     2.5     03-04  Mg     1.7     03-03    TPro  5.8<L>  /  Alb  2.8<L>  /  TBili  0.3  /  DBili  x   /  AST  10  /  ALT  9<L>  /  AlkPhos  78  03-04-23 @ 04:24  TPro  6.1  /  Alb  3.0<L>  /  TBili  0.5  /  DBili  x   /  AST  10  /  ALT  11  /  AlkPhos  80  03-03-23 @ 19:21  TPro  6.6  /  Alb  3.6  /  TBili  0.7  /  DBili  x   /  AST  10  /  ALT  11  /  AlkPhos  84  03-03-23 @ 12:05  TPro  6.8  /  Alb  3.4  /  TBili  0.6  /  DBili  x   /  AST  15  /  ALT  14  /  AlkPhos  96  03-02-23 @ 13:40              03-03-23 @ 07:01  -  03-04-23 @ 07:00  --------------------------------------------------------  IN: 4140 mL / OUT: 850 mL / NET: 3290 mL      41yo G0 LMP 3/1 HD#3 for treatment of TOA. Patient p/w acute worsening of LLQ, tvus w/ enlarged left ovary with complex cystic lesion. Hypotensive and tachycardic upon admission, requiring SICU care. Leukocytosis trend 17->14->17. Clinically patient appears stable at this time. Patient under SICU care given initial presentation and concern for sepsis. VSS at this time. Patient in no acute distress and afebrile.     Neuro: Pain well controlled. Ofirmev q6hrs and tramadol PRN, Dilaudid PRN (while NPO).  CV: Hemodynamically stable,  AM CBC, BMP, Coags  Pulm: Saturating well on room air, encourage oob/amb  GI: NPO for IR procedure   : Voiding spontaneously with minimal vaginal bleeding, continue to monitor UO.  #TOA  - CTAP @ OSH 3/2: Left adnexal mass measuring 9.0 cm with areas of higher density and septations peripherally. Infiltration of the fat adjacent to the mass. Multiple lymph nodes adjacent to the mass including a representative lymph node measuring 8 mm.  - TVUS 3/3: Enlarged left ovary with 7.6 x 4.7 x 5.8 cm cystic structure and central soft tissue components; likely a hemorrhagic cyst. Ovarian cystic neoplasm cannot be excluded. Small volume free fluid in cul-de-sac.  - w/q IV antibiotics: cefotetan qD, doxy, flagy BID (3/3- ); last febrile at 3/3 @ 23:00 38.0 *C  - IR recs: drainage 3/5/23 at 2pm   - MRI (3/3): 9cm left tubo ovarian abscess. No acute abdominal findings.   -Syphilis, Hep A, Hep B and Hep C serologies neg (3/3), GC/CT negative   - f/u BCx x2, UCx; lactate wnl, coags elevated. will repeat PRN  Heme: Ambulation and SCDs for DVT ppx  - Hold HSQ given IR drainage  FEN: LR@100  - s/p 3L LR in ED due to hypotension/tachycardia  ID: Afebrile  Endo: No active issues   Dispo: Appreciate excellent SICU Care    Shiloh Eaton, PGY2  Pt seen and examined with Dr. Maxwell -

## 2023-03-05 NOTE — PROGRESS NOTE ADULT - ASSESSMENT
ASSESSMENT: 41yo G0 LMP 3/1 with no pertinent PMHx presented 3/3 to OSH w/ acute worsening of LLQ pain that x 1 month. CT shows L adnexal mass measuring 9 cm with infiltration of fat adjacent to the mass. Here patient tachycardic, hypotensive to 80s/50s despite 3L IVF, with leukocytosis to 17 on labs. Admitted to SICU for close hemodynamic monitoring.     PLAN:   Neurologic:  - AO x 3  - Pain control with PRN Oxycodone, Dilaudid for breakthrough pain  - IV Tylenol standing    Respiratory:  - No acute issues  - Incentive spirometry    Cardiovascular:  - Sinus tachycardia, likely multifactorial. Will continue to monitor  - BP remains stable, SBP in 90s  - q1 hour VS  - LA negative    Gastrointestinal/Nutrition:  - NPO after MN for IR procedure tomorrow  - Complaining of abx associated diarrhea, probiotic started       Genitourinary/Renal:  - Monitor strict I/Os  - Supplement electrolytes aggressively, repeat at midnight  - LR @100    Hematologic:  - INR downtrending  - Hold chemical VTE ppx while INR elevated  - Mechanical VTE ppx with ICDs    Infectious Disease:  - Leukocytosis improving  - Continue cefotetan 2g [id number] (3/3-?), Flagyl 500mg [id number] (3/3-?), doxycycline 100mg q12 hours (3/3-?)    Endocrine:  - No acute issues  - Euglycemic    GYN:  - MRI confirms 9 cm L tubo-ovarian abscess  - IR procedure tomorrow, NPO after MN  - IV abx as above     Disposition: SICU

## 2023-03-05 NOTE — PROCEDURE NOTE - SPECIMEN OBTAINED
Pt presents ambulatory to  for labs/blood transfusion.  POC discussed with patient.  She verbalized understanding.  Pt reports fatigue today, she denies dizziness or lightheadedness.  PIV started to RFA, blood return verified.  CBC and COD drawn.  Results reviewed, hgb is 8.2 today.  PRBC order discussed with on-call physician Dr. Wong.  Orders received to hold transfusion today and have patient return in 1.5 weeks for repeat labs and possible transfusion.  Also requested new orders with specific parameters for transfusion.  New orders will be requested on Monday.  Pt updated on plan on care.  She states she has transportation complications coming from [place].  Family not available to help with transportation.  Will follow up with management regarding transportation for patient.  Pt was discharged from [facility] in NAD under care of daughter.    Fluid sent for gram stain and culture

## 2023-03-05 NOTE — PROGRESS NOTE ADULT - SUBJECTIVE AND OBJECTIVE BOX
24 HOUR EVENTS:  - MRI confirms 9 cm L tubo-ovarian abscess  - NPO after MN for IR procedure tomorrow  - Complaining of abx associated diarrhea, probiotic started       SUBJECTIVE/ROS:  [ X ] A ten-point review of systems was otherwise negative except as noted.  [ ] Due to altered mental status/intubation, subjective information were not able to be obtained from the patient. History was obtained, to the extent possible, from review of the chart and collateral sources of information.      NEURO  Exam: AOx3. NAD. Follows commands. Moves all extremities. Strength and sensation intact.   Meds: HYDROmorphone  Injectable 0.25 milliGRAM(s) IV Push every 3 hours PRN breakthrough pain  HYDROmorphone  Injectable 0.25 milliGRAM(s) IV Push every 3 hours PRN Severe Pain (7 - 10)  traMADol 25 milliGRAM(s) Oral every 6 hours PRN Moderate Pain (4 - 6)    [x] Adequacy of sedation and pain control has been assessed and adjusted      RESPIRATORY  RR: 30 (03-05-23 @ 01:00) (18 - 31)  SpO2: 94% (03-05-23 @ 01:00) (94% - 99%)  Wt(kg): --  Exam: CTA b/l. No murmurs, rubs, gallops appreciated.   Mechanical Ventilation:   [ ] Extubation Readiness Assessed  Meds:       CARDIOVASCULAR  HR: 111 (03-05-23 @ 01:00) (90 - 111)  BP: 99/68 (03-05-23 @ 01:00) (83/53 - 108/72)  BP(mean): 78 (03-05-23 @ 01:00) (62 - 86)  ABP: --  ABP(mean): --  Wt(kg): --  CVP(cm H2O): --  VBG - ( 04 Mar 2023 04:15 )  pH: 7.33  /  pCO2: 44    /  pO2: 42    / HCO3: 23    / Base Excess: -2.6  /  SaO2: 68.3   Lactate: 1.2    Exam: S1S2. No murmurs, rubs, gallops appreciated.  Cardiac Rhythm: Sinus tachycardia rate 113  Meds:       GI/NUTRITION  Exam: Soft, non-distended, non-tender.   Diet: Regular, NPO after midnight  Meds:     GENITOURINARY  I&O's Detail    03-03 @ 07:01  -  03-04 @ 07:00  --------------------------------------------------------  IN:    IV PiggyBack: 650 mL    IV PiggyBack: 400 mL    IV PiggyBack: 450 mL    Lactated Ringers: 1400 mL    Lactated Ringers Bolus: 1000 mL    Oral Fluid: 240 mL  Total IN: 4140 mL    OUT:    Voided (mL): 850 mL  Total OUT: 850 mL    Total NET: 3290 mL      03-04 @ 07:01 - 03-05 @ 01:52  --------------------------------------------------------  IN:    IV PiggyBack: 150 mL    IV PiggyBack: 100 mL    IV PiggyBack: 250 mL    Lactated Ringers: 1800 mL    Oral Fluid: 550 mL  Total IN: 2850 mL    OUT:    Voided (mL): 450 mL  Total OUT: 450 mL    Total NET: 2400 mL          03-04    138  |  106  |  7   ----------------------------<  133<H>  3.6   |  20<L>  |  0.53    Ca    8.4      04 Mar 2023 04:24  Phos  4.0     03-04  Mg     2.5     03-04    TPro  5.8<L>  /  Alb  2.8<L>  /  TBili  0.3  /  DBili  x   /  AST  10  /  ALT  9<L>  /  AlkPhos  78  03-04    [ ] Holm catheter, indication: N/A  Meds: lactated ringers. 1000 milliLiter(s) IV Continuous <Continuous>        HEMATOLOGIC  Meds:   [x] VTE Prophylaxis                        7.9    15.75 )-----------( 296      ( 05 Mar 2023 00:48 )             25.7     PT/INR - ( 05 Mar 2023 00:48 )   PT: 19.2 sec;   INR: 1.66 ratio         PTT - ( 05 Mar 2023 00:48 )  PTT:30.0 sec  Transfusion     [ ] PRBC   [ ] Platelets   [ ] FFP   [ ] Cryoprecipitate      INFECTIOUS DISEASES  T(C): 37 (03-05-23 @ 01:00), Max: 37.3 (03-04-23 @ 11:00)  Wt(kg): --  WBC Count: 15.75 K/uL (03-05 @ 00:48)  WBC Count: 17.12 K/uL (03-04 @ 04:24)    Recent Cultures:  Specimen Source: .Blood Blood-Peripheral, 03-03 @ 13:30; Results   No growth to date.; Gram Stain: --; Organism: --  Specimen Source: .Blood Blood-Peripheral, 03-03 @ 13:00; Results   No growth to date.; Gram Stain: --; Organism: --    Meds: cefoTEtan  IVPB 2 Gram(s) IV Intermittent every 12 hours  doxycycline IVPB 100 milliGRAM(s) IV Intermittent every 12 hours  metroNIDAZOLE  IVPB 500 milliGRAM(s) IV Intermittent every 8 hours        ENDOCRINE  Capillary Blood Glucose    Meds:       ACCESS DEVICES:  [ X ] Peripheral IV  [ ] Central Venous Line	[ ] R	[ ] L	[ ] IJ	[ ] Fem	[ ] SC	Placed:   [ ] Arterial Line		[ ] R	[ ] L	[ ] Fem	[ ] Rad	[ ] Ax	Placed:   [ ] PICC:					[ ] Mediport  [ ] Urinary Catheter, Date Placed:   [ ] Necessity of urinary, arterial, and venous catheters discussed    OTHER MEDICATIONS:  chlorhexidine 2% Cloths 1 Application(s) Topical daily      CODE STATUS:     IMAGING:

## 2023-03-05 NOTE — PROCEDURE NOTE - PLAN
- Routine site cleaning and dressing changes.  - Record output every 8 hours in the hospital and daily at home.  - Flush catheter with 5-10 mL of saline every 24 hours to prevent catheter occlusion.  - If leakage around catheter, leakage or pain with flushing, or a significant change in output or position, please contact IR for evaluation ([email or web address] [id number] / NS [phone]).

## 2023-03-06 ENCOUNTER — APPOINTMENT (OUTPATIENT)
Dept: OBGYN | Facility: CLINIC | Age: 43
End: 2023-03-06

## 2023-03-06 LAB
APTT BLD: 34 SEC — SIGNIFICANT CHANGE UP (ref 27.5–35.5)
BASOPHILS # BLD AUTO: 0.02 K/UL — SIGNIFICANT CHANGE UP (ref 0–0.2)
BASOPHILS NFR BLD AUTO: 0.1 % — SIGNIFICANT CHANGE UP (ref 0–2)
EOSINOPHIL # BLD AUTO: 0.01 K/UL — SIGNIFICANT CHANGE UP (ref 0–0.5)
EOSINOPHIL NFR BLD AUTO: 0.1 % — SIGNIFICANT CHANGE UP (ref 0–6)
HCT VFR BLD CALC: 23 % — LOW (ref 34.5–45)
HGB BLD-MCNC: 7.1 G/DL — LOW (ref 11.5–15.5)
IMM GRANULOCYTES NFR BLD AUTO: 0.5 % — SIGNIFICANT CHANGE UP (ref 0–0.9)
INR BLD: 1.58 RATIO — HIGH (ref 0.88–1.16)
LYMPHOCYTES # BLD AUTO: 1.28 K/UL — SIGNIFICANT CHANGE UP (ref 1–3.3)
LYMPHOCYTES # BLD AUTO: 8.2 % — LOW (ref 13–44)
MCHC RBC-ENTMCNC: 25.8 PG — LOW (ref 27–34)
MCHC RBC-ENTMCNC: 30.9 GM/DL — LOW (ref 32–36)
MCV RBC AUTO: 83.6 FL — SIGNIFICANT CHANGE UP (ref 80–100)
MONOCYTES # BLD AUTO: 0.39 K/UL — SIGNIFICANT CHANGE UP (ref 0–0.9)
MONOCYTES NFR BLD AUTO: 2.5 % — SIGNIFICANT CHANGE UP (ref 2–14)
NEUTROPHILS # BLD AUTO: 13.88 K/UL — HIGH (ref 1.8–7.4)
NEUTROPHILS NFR BLD AUTO: 88.6 % — HIGH (ref 43–77)
NRBC # BLD: 0 /100 WBCS — SIGNIFICANT CHANGE UP (ref 0–0)
PLATELET # BLD AUTO: 281 K/UL — SIGNIFICANT CHANGE UP (ref 150–400)
PROTHROM AB SERPL-ACNC: 18.3 SEC — HIGH (ref 10.5–13.4)
RBC # BLD: 2.75 M/UL — LOW (ref 3.8–5.2)
RBC # FLD: 15.9 % — HIGH (ref 10.3–14.5)
WBC # BLD: 15.66 K/UL — HIGH (ref 3.8–10.5)
WBC # FLD AUTO: 15.66 K/UL — HIGH (ref 3.8–10.5)

## 2023-03-06 PROCEDURE — 99232 SBSQ HOSP IP/OBS MODERATE 35: CPT | Mod: GC

## 2023-03-06 PROCEDURE — 99231 SBSQ HOSP IP/OBS SF/LOW 25: CPT

## 2023-03-06 RX ORDER — HEPARIN SODIUM 5000 [USP'U]/ML
5000 INJECTION INTRAVENOUS; SUBCUTANEOUS EVERY 12 HOURS
Refills: 0 | Status: DISCONTINUED | OUTPATIENT
Start: 2023-03-06 | End: 2023-03-09

## 2023-03-06 RX ORDER — METOCLOPRAMIDE HCL 10 MG
5 TABLET ORAL ONCE
Refills: 0 | Status: COMPLETED | OUTPATIENT
Start: 2023-03-06 | End: 2023-03-06

## 2023-03-06 RX ORDER — ACETAMINOPHEN 500 MG
675 TABLET ORAL ONCE
Refills: 0 | Status: COMPLETED | OUTPATIENT
Start: 2023-03-06 | End: 2023-03-06

## 2023-03-06 RX ORDER — ONDANSETRON 8 MG/1
4 TABLET, FILM COATED ORAL ONCE
Refills: 0 | Status: COMPLETED | OUTPATIENT
Start: 2023-03-06 | End: 2023-03-06

## 2023-03-06 RX ORDER — FAMOTIDINE 10 MG/ML
20 INJECTION INTRAVENOUS DAILY
Refills: 0 | Status: DISCONTINUED | OUTPATIENT
Start: 2023-03-06 | End: 2023-03-06

## 2023-03-06 RX ORDER — DIPHENHYDRAMINE HYDROCHLORIDE AND LIDOCAINE HYDROCHLORIDE AND ALUMINUM HYDROXIDE AND MAGNESIUM HYDRO
10 KIT EVERY 6 HOURS
Refills: 0 | Status: DISCONTINUED | OUTPATIENT
Start: 2023-03-06 | End: 2023-03-09

## 2023-03-06 RX ORDER — KETOROLAC TROMETHAMINE 30 MG/ML
15 SYRINGE (ML) INJECTION EVERY 6 HOURS
Refills: 0 | Status: DISCONTINUED | OUTPATIENT
Start: 2023-03-06 | End: 2023-03-07

## 2023-03-06 RX ORDER — FAMOTIDINE 10 MG/ML
20 INJECTION INTRAVENOUS DAILY
Refills: 0 | Status: DISCONTINUED | OUTPATIENT
Start: 2023-03-06 | End: 2023-03-09

## 2023-03-06 RX ORDER — ONDANSETRON 8 MG/1
4 TABLET, FILM COATED ORAL EVERY 6 HOURS
Refills: 0 | Status: DISCONTINUED | OUTPATIENT
Start: 2023-03-06 | End: 2023-03-08

## 2023-03-06 RX ORDER — SODIUM CHLORIDE 9 MG/ML
1000 INJECTION, SOLUTION INTRAVENOUS
Refills: 0 | Status: DISCONTINUED | OUTPATIENT
Start: 2023-03-06 | End: 2023-03-07

## 2023-03-06 RX ADMIN — Medication 100 MILLIGRAM(S): at 21:41

## 2023-03-06 RX ADMIN — ONDANSETRON 4 MILLIGRAM(S): 8 TABLET, FILM COATED ORAL at 15:53

## 2023-03-06 RX ADMIN — ONDANSETRON 4 MILLIGRAM(S): 8 TABLET, FILM COATED ORAL at 06:04

## 2023-03-06 RX ADMIN — Medication 100 GRAM(S): at 08:00

## 2023-03-06 RX ADMIN — Medication 100 MILLIGRAM(S): at 14:30

## 2023-03-06 RX ADMIN — Medication 5 MILLIGRAM(S): at 06:36

## 2023-03-06 RX ADMIN — Medication 270 MILLIGRAM(S): at 08:32

## 2023-03-06 RX ADMIN — DIPHENHYDRAMINE HYDROCHLORIDE AND LIDOCAINE HYDROCHLORIDE AND ALUMINUM HYDROXIDE AND MAGNESIUM HYDRO 10 MILLILITER(S): KIT at 09:46

## 2023-03-06 RX ADMIN — Medication 270 MILLIGRAM(S): at 15:30

## 2023-03-06 RX ADMIN — DIPHENHYDRAMINE HYDROCHLORIDE AND LIDOCAINE HYDROCHLORIDE AND ALUMINUM HYDROXIDE AND MAGNESIUM HYDRO 10 MILLILITER(S): KIT at 15:27

## 2023-03-06 RX ADMIN — FAMOTIDINE 20 MILLIGRAM(S): 10 INJECTION INTRAVENOUS at 09:46

## 2023-03-06 RX ADMIN — ONDANSETRON 4 MILLIGRAM(S): 8 TABLET, FILM COATED ORAL at 14:30

## 2023-03-06 RX ADMIN — Medication 100 GRAM(S): at 20:02

## 2023-03-06 RX ADMIN — Medication 675 MILLIGRAM(S): at 20:30

## 2023-03-06 RX ADMIN — ONDANSETRON 4 MILLIGRAM(S): 8 TABLET, FILM COATED ORAL at 08:46

## 2023-03-06 RX ADMIN — Medication 100 MILLIGRAM(S): at 06:04

## 2023-03-06 RX ADMIN — Medication 5 MILLIGRAM(S): at 12:17

## 2023-03-06 RX ADMIN — HEPARIN SODIUM 5000 UNIT(S): 5000 INJECTION INTRAVENOUS; SUBCUTANEOUS at 17:38

## 2023-03-06 RX ADMIN — Medication 100 MILLIGRAM(S): at 08:46

## 2023-03-06 RX ADMIN — Medication 675 MILLIGRAM(S): at 15:45

## 2023-03-06 RX ADMIN — Medication 675 MILLIGRAM(S): at 02:30

## 2023-03-06 RX ADMIN — Medication 270 MILLIGRAM(S): at 01:37

## 2023-03-06 RX ADMIN — Medication 270 MILLIGRAM(S): at 20:00

## 2023-03-06 RX ADMIN — Medication 1 TABLET(S): at 17:48

## 2023-03-06 RX ADMIN — Medication 100 MILLIGRAM(S): at 20:32

## 2023-03-06 NOTE — PROVIDER CONTACT NOTE (CRITICAL VALUE NOTIFICATION) - TEST AND RESULT REPORTED:
Abscess culture - rare polymorpho nuclear leukocytes per low power field. moderate gram positive cocci in pairs and chains per oil power field

## 2023-03-06 NOTE — PROGRESS NOTE ADULT - ASSESSMENT
41yo G0 LMP 3/1 HD#4 for treatment of TOA. Patient p/w acute worsening of LLQ, tvus w/ enlarged left ovary with complex cystic lesion. Hypotensive and tachycardic upon admission, requiring SICU care. Now s/p IR drainage of 135cc purulent fluid (3/5). Clinically patient appears stable at this time. Patient under SICU care given initial presentation and concern for sepsis. VSS at this time. Patient in no acute distress and afebrile.     Neuro: Pain well controlled. Ofirmev q6hrs and tramadol PRN, Dilaudid PRN. Consider transitioning to PO pain medication.   CV: Hemodynamically stable,  AM CBC, BMP, Coags  Pulm: Saturating well on room air, encourage oob/amb  GI: Regular diet   : Voiding spontaneously with minimal vaginal bleeding, continue to monitor UO.  #TOA  - CTAP @ OSH 3/2: Left adnexal mass measuring 9.0 cm with areas of higher density and septations peripherally. Infiltration of the fat adjacent to the mass. Multiple lymph nodes adjacent to the mass including a representative lymph node measuring 8 mm.  - TVUS 3/3: Enlarged left ovary with 7.6 x 4.7 x 5.8 cm cystic structure and central soft tissue components; likely a hemorrhagic cyst. Ovarian cystic neoplasm cannot be excluded. Small volume free fluid in cul-de-sac.  - w/q IV antibiotics: cefotetan qD, doxy, flagy BID (3/3- ); last febrile at 3/3 @ 23:00 38.0 *C  - IR recs: drainage of 135cc purulent output. LLQ drain placed.   - MRI (3/3): 9cm left tubo ovarian abscess. No acute abdominal findings.   -Syphilis, Hep A, Hep B and Hep C serologies neg (3/3), GC/CT negative   - f/u BCx x2, UCx; lactate wnl, coags elevated. will repeat PRN  Heme: Ambulation and SCDs for DVT ppx  - Hold HSQ given plan for IR drainage  FEN: LR@100  - s/p 3L LR in ED due to hypotension/tachycardia  ID: Afebrile  - c/w IV doxy (3/3-), IV flagyl (3/3-), IV cefotetan (3/3-)  Endo: No active issues   Dispo: Continue inpatient care     Shiloh Eaton, PGY2

## 2023-03-06 NOTE — PROGRESS NOTE ADULT - ASSESSMENT
Interventional Radiology Follow-Up Note  This is a 42y Female s/p pelvic abscess drain placement on 3/5 in Interventional Radiology with Dr. Morales.     S: Patient seen and examined @ bedside. No complaints offered.     Medication:   cefoTEtan  IVPB: (03-06)  doxycycline IVPB: (03-05)  metroNIDAZOLE  IVPB: (03-06)    Vitals:   T(F): 98.6, Max: 99 (10:40)  HR: 104  BP: 115/80  RR: 18  SpO2: 96%    Physical Exam:  General: Nontoxic, in NAD, A&O x3.  Abdomen: soft, NTND, no peritoneal signs.  Drain Device: Drain intact attached to gravity bag. Dressing clean, dry, intact.    24hr Drain output: 20cc; Flushed with 5cc NS without resistance, leaking or pain at the site     LABS:  WBC 15.66 / Hgb 7.1 / Hct 23.0 / Plt 281  Na -- / K -- / CO2 -- / Cl -- / BUN -- / Cr -- / Glucose --  ALT -- / AST -- / Alk Phos -- / Tbili --  Ptt -- / Pt -- / INR --    Assessment/Plan:  41yo G0 LMP 3/1 HD#3 for treatment of TOA. Patient p/w acute worsening of LLQ, tvus w/ enlarged left ovary with complex cystic lesion. Hypotensive and tachycardic upon admission, requiring SICU care. No improvement in leukocytosis; pt now s/p IR drain placement.     -continue global management per primary team  -monitor h/h; transfuse as needed  -trend vs/labs/output  -if output <10cc in 24 hrs for two consecutive days consider repeat imaging  -flush drain with 5cc NS daily forward only; DO NOT aspirate  -change dressing q3 days or when dressing is saturated  -regarding outpatient follow up with IR, if the patient is d/c home with drainage catheter they can make an appointment with IR by calling the IR booking office at [phone]; recommend IR follow in 2 weeks for tube evaluation.  - Greater than 50% of the encounter was spent counseling and/or coordination of care on drain management and follow up.   -they will benefit from VNS service to help with drainage catheter care; they should continue same drainage catheter care as an outpatient.     Please call IR at extension [phone] with any questions, concerns, or issues regarding above.      Ligia Martin NP  Available on Teams

## 2023-03-06 NOTE — PROGRESS NOTE ADULT - SUBJECTIVE AND OBJECTIVE BOX
GYN Progress Note HD#4    Pt seen and examined at bedside in NAD. Abdominal pain controlled with pain medication, improved after IR drainage. Abx giving pt diarrhea but reports probiotics helping.  Denies shortness of breath. Patient denies lightheadedness chest pain, fevers, chills, diarrhea.    Vital Signs Last 24 Hours  T(C): 36.7 (03-06-23 @ 01:00), Max: 37.2 (03-05-23 @ 07:00)  HR: 111 (03-06-23 @ 01:00) (88 - 111)  BP: 106/72 (03-06-23 @ 01:00) (98/59 - 109/63)  RR: 18 (03-06-23 @ 01:00) (18 - 33)  SpO2: 92% (03-06-23 @ 01:00) (90% - 98%)    I&O's Summary    04 Mar 2023 07:01  -  05 Mar 2023 07:00  --------------------------------------------------------  IN: 4016.6 mL / OUT: 450 mL / NET: 3566.6 mL    05 Mar 2023 07:01  -  06 Mar 2023 06:23  --------------------------------------------------------  IN: 1033.2 mL / OUT: 470 mL / NET: 563.2 mL    Gen: alert, oriented  CVS: RRR  Lungs: CTAB  Abdomen: Soft, mildy tender, non distended at areas where mass isn't present near epigastric region with mass edge palpated 3cm above umbilicus. LLQ abdominal drain c/d/i.       Labs:                        7.6    14.95 )-----------( 302      ( 05 Mar 2023 13:36 )             24.4   baso x      eos x      imm gran x      lymph x      mono x      poly x                            7.9    15.75 )-----------( 296      ( 05 Mar 2023 00:48 )             25.7   baso x      eos x      imm gran x      lymph x      mono x      poly x                            8.2    17.12 )-----------( 263      ( 04 Mar 2023 04:24 )             26.9   baso x      eos x      imm gran x      lymph x      mono x      poly x                            8.6    14.94 )-----------( 260      ( 03 Mar 2023 19:21 )             27.5   baso x      eos x      imm gran x      lymph x      mono x      poly x                            9.2    17.58 )-----------( 317      ( 03 Mar 2023 12:05 )             29.7   baso 2.4    eos 0.0    imm gran x      lymph 30.9   mono 4.8    poly 50.0       MEDICATIONS  (STANDING):  acetaminophen   IVPB .. 675 milliGRAM(s) IV Intermittent once  acetaminophen   IVPB .. 675 milliGRAM(s) IV Intermittent once  acetaminophen   IVPB .. 675 milliGRAM(s) IV Intermittent once  cefoTEtan  IVPB 2 Gram(s) IV Intermittent every 12 hours  chlorhexidine 2% Cloths 1 Application(s) Topical daily  doxycycline IVPB 100 milliGRAM(s) IV Intermittent every 12 hours  lactobacillus acidophilus 1 Tablet(s) Oral daily  metroNIDAZOLE  IVPB 500 milliGRAM(s) IV Intermittent every 8 hours    MEDICATIONS  (PRN):  acetaminophen     Tablet .. 650 milliGRAM(s) Oral every 6 hours PRN Mild Pain (1 - 3)  aluminum hydroxide/magnesium hydroxide/simethicone Suspension 30 milliLiter(s) Oral every 4 hours PRN Dyspepsia  traMADol 25 milliGRAM(s) Oral every 6 hours PRN Moderate Pain (4 - 6)   GYN Progress Note HD#4    Pt seen and examined at bedside in NAD. Abdominal pain controlled with pain medication, improved after IR drainage. Abx giving pt diarrhea but reports probiotics helping.  Denies shortness of breath. Patient denies lightheadedness chest pain, fevers, chills, diarrhea.    Vital Signs Last 24 Hours  T(C): 36.7 (03-06-23 @ 01:00), Max: 37.2 (03-05-23 @ 07:00)  HR: 111 (03-06-23 @ 01:00) (88 - 111)  BP: 106/72 (03-06-23 @ 01:00) (98/59 - 109/63)  RR: 18 (03-06-23 @ 01:00) (18 - 33)  SpO2: 92% (03-06-23 @ 01:00) (90% - 98%)    I&O's Summary    04 Mar 2023 07:01  -  05 Mar 2023 07:00  --------------------------------------------------------  IN: 4016.6 mL / OUT: 450 mL / NET: 3566.6 mL    05 Mar 2023 07:01  -  06 Mar 2023 06:23  --------------------------------------------------------  IN: 1033.2 mL / OUT: 470 mL / NET: 563.2 mL    Gen: alert, oriented  CVS: RRR  Lungs: CTAB  Abdomen: Soft, mildy tender, LLQ abdominal drain c/d/i.       Labs:                        7.6    14.95 )-----------( 302      ( 05 Mar 2023 13:36 )             24.4   baso x      eos x      imm gran x      lymph x      mono x      poly x                            7.9    15.75 )-----------( 296      ( 05 Mar 2023 00:48 )             25.7   baso x      eos x      imm gran x      lymph x      mono x      poly x                            8.2    17.12 )-----------( 263      ( 04 Mar 2023 04:24 )             26.9   baso x      eos x      imm gran x      lymph x      mono x      poly x                            8.6    14.94 )-----------( 260      ( 03 Mar 2023 19:21 )             27.5   baso x      eos x      imm gran x      lymph x      mono x      poly x                            9.2    17.58 )-----------( 317      ( 03 Mar 2023 12:05 )             29.7   baso 2.4    eos 0.0    imm gran x      lymph 30.9   mono 4.8    poly 50.0       MEDICATIONS  (STANDING):  acetaminophen   IVPB .. 675 milliGRAM(s) IV Intermittent once  acetaminophen   IVPB .. 675 milliGRAM(s) IV Intermittent once  acetaminophen   IVPB .. 675 milliGRAM(s) IV Intermittent once  cefoTEtan  IVPB 2 Gram(s) IV Intermittent every 12 hours  chlorhexidine 2% Cloths 1 Application(s) Topical daily  doxycycline IVPB 100 milliGRAM(s) IV Intermittent every 12 hours  lactobacillus acidophilus 1 Tablet(s) Oral daily  metroNIDAZOLE  IVPB 500 milliGRAM(s) IV Intermittent every 8 hours    MEDICATIONS  (PRN):  acetaminophen     Tablet .. 650 milliGRAM(s) Oral every 6 hours PRN Mild Pain (1 - 3)  aluminum hydroxide/magnesium hydroxide/simethicone Suspension 30 milliLiter(s) Oral every 4 hours PRN Dyspepsia  traMADol 25 milliGRAM(s) Oral every 6 hours PRN Moderate Pain (4 - 6)

## 2023-03-06 NOTE — CHART NOTE - NSCHARTNOTEFT_GEN_A_CORE
At bedside for PM rounds.     Pt endorsing some continued nausea after she tried to gargle mouthwash. Pt has been tolerating ice chips, however, is nervous to try solid food such as crackers. Pt ambulating, pain is improving. Voiding spontaneously. Pt endorsing passing gas and somewhat loose bowel movements. Denies fevers, chills, CP, SOB.       Vital Signs Last 24 Hours  T(C): 36.8 (03-06-23 @ 13:00), Max: 37 (03-06-23 @ 06:00)  HR: 91 (03-06-23 @ 13:00) (88 - 111)  BP: 108/73 (03-06-23 @ 13:00) (95/64 - 115/80)  RR: 18 (03-06-23 @ 13:00) (18 - 20)  SpO2: 98% (03-06-23 @ 13:00) (92% - 98%)    I&O's Summary    05 Mar 2023 07:01  -  06 Mar 2023 07:00  --------------------------------------------------------  IN: 1033.2 mL / OUT: 475 mL / NET: 558.2 mL    06 Mar 2023 07:01  -  06 Mar 2023 16:12  --------------------------------------------------------  IN: 500 mL / OUT: 360 mL / NET: 140 mL      Gen: alert, oriented  CVS: RRR  Lungs: CTAB  Abdomen: Soft, mildy tender, LLQ abdominal drain c/d/i.     Labs:                        7.1    15.66 )-----------( 281      ( 06 Mar 2023 06:43 )             23.0   baso 0.1    eos 0.1    imm gran 0.5    lymph 8.2    mono 2.5    poly 88.6                         7.6    14.95 )-----------( 302      ( 05 Mar 2023 13:36 )             24.4   baso x      eos x      imm gran x      lymph x      mono x      poly x                            7.9    15.75 )-----------( 296      ( 05 Mar 2023 00:48 )             25.7   baso x      eos x      imm gran x      lymph x      mono x      poly x                            8.2    17.12 )-----------( 263      ( 04 Mar 2023 04:24 )             26.9   baso x      eos x      imm gran x      lymph x      mono x      poly x                            8.6    14.94 )-----------( 260      ( 03 Mar 2023 19:21 )             27.5   baso x      eos x      imm gran x      lymph x      mono x      poly x          MEDICATIONS  (STANDING):  acetaminophen   IVPB .. 675 milliGRAM(s) IV Intermittent once  cefoTEtan  IVPB 2 Gram(s) IV Intermittent every 12 hours  chlorhexidine 2% Cloths 1 Application(s) Topical daily  doxycycline IVPB 100 milliGRAM(s) IV Intermittent every 12 hours  famotidine Injectable 20 milliGRAM(s) IV Push daily  heparin   Injectable 5000 Unit(s) SubCutaneous every 12 hours  lactated ringers. 1000 milliLiter(s) (100 mL/Hr) IV Continuous <Continuous>  lactobacillus acidophilus 1 Tablet(s) Oral daily  metroNIDAZOLE  IVPB 500 milliGRAM(s) IV Intermittent every 8 hours    MEDICATIONS  (PRN):  acetaminophen     Tablet .. 650 milliGRAM(s) Oral every 6 hours PRN Mild Pain (1 - 3)  aluminum hydroxide/magnesium hydroxide/simethicone Suspension 30 milliLiter(s) Oral every 4 hours PRN Dyspepsia  FIRST- Mouthwash  BLM 10 milliLiter(s) Swish and Spit every 6 hours PRN Mouth Care  ondansetron Injectable 4 milliGRAM(s) IV Push every 6 hours PRN Nausea and/or Vomiting    41yo G0 LMP 3/1 HD#4 for treatment of TOA. Patient p/w acute worsening of LLQ, tvus w/ enlarged left ovary with complex cystic lesion. Hypotensive and tachycardic upon admission, requiring SICU care. Now s/p IR drainage of 135cc purulent fluid (3/5). Clinically patient appears stable at this time. Patient under SICU care given initial presentation and concern for sepsis. VSS at this time. Patient in no acute distress and afebrile.     - Zofran PRN for nausea, received 2 doses of reglan today for breakthrough nausea  - Recommend small sips of water/ginger ale, whatever she can tolerate   - LR@50     Shiloh Eaton, PGY2  d/w Dr. Stephens

## 2023-03-07 LAB
-  AMIKACIN: SIGNIFICANT CHANGE UP
-  AMOXICILLIN/CLAVULANIC ACID: SIGNIFICANT CHANGE UP
-  AMPICILLIN/SULBACTAM: SIGNIFICANT CHANGE UP
-  AMPICILLIN: SIGNIFICANT CHANGE UP
-  AZTREONAM: SIGNIFICANT CHANGE UP
-  CEFAZOLIN: SIGNIFICANT CHANGE UP
-  CEFEPIME: SIGNIFICANT CHANGE UP
-  CEFOXITIN: SIGNIFICANT CHANGE UP
-  CEFTRIAXONE: SIGNIFICANT CHANGE UP
-  CIPROFLOXACIN: SIGNIFICANT CHANGE UP
-  ERTAPENEM: SIGNIFICANT CHANGE UP
-  GENTAMICIN: SIGNIFICANT CHANGE UP
-  IMIPENEM: SIGNIFICANT CHANGE UP
-  LEVOFLOXACIN: SIGNIFICANT CHANGE UP
-  MEROPENEM: SIGNIFICANT CHANGE UP
-  PIPERACILLIN/TAZOBACTAM: SIGNIFICANT CHANGE UP
-  TOBRAMYCIN: SIGNIFICANT CHANGE UP
-  TRIMETHOPRIM/SULFAMETHOXAZOLE: SIGNIFICANT CHANGE UP
ANION GAP SERPL CALC-SCNC: 10 MMOL/L — SIGNIFICANT CHANGE UP (ref 5–17)
APTT BLD: 39.2 SEC — HIGH (ref 27.5–35.5)
BASOPHILS # BLD AUTO: 0.04 K/UL — SIGNIFICANT CHANGE UP (ref 0–0.2)
BASOPHILS NFR BLD AUTO: 0.3 % — SIGNIFICANT CHANGE UP (ref 0–2)
BLD GP AB SCN SERPL QL: NEGATIVE — SIGNIFICANT CHANGE UP
BUN SERPL-MCNC: 8 MG/DL — SIGNIFICANT CHANGE UP (ref 7–23)
CALCIUM SERPL-MCNC: 8.8 MG/DL — SIGNIFICANT CHANGE UP (ref 8.4–10.5)
CHLORIDE SERPL-SCNC: 101 MMOL/L — SIGNIFICANT CHANGE UP (ref 96–108)
CO2 SERPL-SCNC: 26 MMOL/L — SIGNIFICANT CHANGE UP (ref 22–31)
CREAT SERPL-MCNC: 0.4 MG/DL — LOW (ref 0.5–1.3)
EGFR: 127 ML/MIN/1.73M2 — SIGNIFICANT CHANGE UP
EOSINOPHIL # BLD AUTO: 0.27 K/UL — SIGNIFICANT CHANGE UP (ref 0–0.5)
EOSINOPHIL NFR BLD AUTO: 1.8 % — SIGNIFICANT CHANGE UP (ref 0–6)
GLUCOSE SERPL-MCNC: 109 MG/DL — HIGH (ref 70–99)
HCT VFR BLD CALC: 29.9 % — LOW (ref 34.5–45)
HGB BLD-MCNC: 9.3 G/DL — LOW (ref 11.5–15.5)
IMM GRANULOCYTES NFR BLD AUTO: 0.7 % — SIGNIFICANT CHANGE UP (ref 0–0.9)
INR BLD: 1.81 RATIO — HIGH (ref 0.88–1.16)
LYMPHOCYTES # BLD AUTO: 1.65 K/UL — SIGNIFICANT CHANGE UP (ref 1–3.3)
LYMPHOCYTES # BLD AUTO: 10.9 % — LOW (ref 13–44)
MCHC RBC-ENTMCNC: 22.5 PG — LOW (ref 27–34)
MCHC RBC-ENTMCNC: 31.1 GM/DL — LOW (ref 32–36)
MCV RBC AUTO: 72.2 FL — LOW (ref 80–100)
METHOD TYPE: SIGNIFICANT CHANGE UP
MONOCYTES # BLD AUTO: 1.12 K/UL — HIGH (ref 0–0.9)
MONOCYTES NFR BLD AUTO: 7.4 % — SIGNIFICANT CHANGE UP (ref 2–14)
NEUTROPHILS # BLD AUTO: 11.96 K/UL — HIGH (ref 1.8–7.4)
NEUTROPHILS NFR BLD AUTO: 78.9 % — HIGH (ref 43–77)
NRBC # BLD: 0 /100 WBCS — SIGNIFICANT CHANGE UP (ref 0–0)
PLATELET # BLD AUTO: 452 K/UL — HIGH (ref 150–400)
POTASSIUM SERPL-MCNC: 3.5 MMOL/L — SIGNIFICANT CHANGE UP (ref 3.5–5.3)
POTASSIUM SERPL-SCNC: 3.5 MMOL/L — SIGNIFICANT CHANGE UP (ref 3.5–5.3)
PROTHROM AB SERPL-ACNC: 20.9 SEC — HIGH (ref 10.5–13.4)
RBC # BLD: 4.14 M/UL — SIGNIFICANT CHANGE UP (ref 3.8–5.2)
RBC # FLD: 16.5 % — HIGH (ref 10.3–14.5)
RH IG SCN BLD-IMP: POSITIVE — SIGNIFICANT CHANGE UP
SODIUM SERPL-SCNC: 137 MMOL/L — SIGNIFICANT CHANGE UP (ref 135–145)
WBC # BLD: 15.15 K/UL — HIGH (ref 3.8–10.5)
WBC # FLD AUTO: 15.15 K/UL — HIGH (ref 3.8–10.5)

## 2023-03-07 PROCEDURE — 99232 SBSQ HOSP IP/OBS MODERATE 35: CPT

## 2023-03-07 PROCEDURE — 99232 SBSQ HOSP IP/OBS MODERATE 35: CPT | Mod: GC

## 2023-03-07 RX ORDER — SODIUM CHLORIDE 9 MG/ML
3 INJECTION INTRAMUSCULAR; INTRAVENOUS; SUBCUTANEOUS EVERY 8 HOURS
Refills: 0 | Status: DISCONTINUED | OUTPATIENT
Start: 2023-03-07 | End: 2023-03-09

## 2023-03-07 RX ORDER — SODIUM CHLORIDE 9 MG/ML
1000 INJECTION, SOLUTION INTRAVENOUS
Refills: 0 | Status: DISCONTINUED | OUTPATIENT
Start: 2023-03-07 | End: 2023-03-08

## 2023-03-07 RX ORDER — ACETAMINOPHEN 500 MG
675 TABLET ORAL ONCE
Refills: 0 | Status: COMPLETED | OUTPATIENT
Start: 2023-03-07 | End: 2023-03-07

## 2023-03-07 RX ORDER — IBUPROFEN 200 MG
600 TABLET ORAL EVERY 6 HOURS
Refills: 0 | Status: DISCONTINUED | OUTPATIENT
Start: 2023-03-07 | End: 2023-03-09

## 2023-03-07 RX ADMIN — FAMOTIDINE 20 MILLIGRAM(S): 10 INJECTION INTRAVENOUS at 11:33

## 2023-03-07 RX ADMIN — Medication 270 MILLIGRAM(S): at 21:16

## 2023-03-07 RX ADMIN — HEPARIN SODIUM 5000 UNIT(S): 5000 INJECTION INTRAVENOUS; SUBCUTANEOUS at 05:09

## 2023-03-07 RX ADMIN — ONDANSETRON 4 MILLIGRAM(S): 8 TABLET, FILM COATED ORAL at 15:33

## 2023-03-07 RX ADMIN — Medication 100 GRAM(S): at 22:20

## 2023-03-07 RX ADMIN — Medication 100 MILLIGRAM(S): at 05:09

## 2023-03-07 RX ADMIN — SODIUM CHLORIDE 3 MILLILITER(S): 9 INJECTION INTRAMUSCULAR; INTRAVENOUS; SUBCUTANEOUS at 22:16

## 2023-03-07 RX ADMIN — Medication 650 MILLIGRAM(S): at 00:00

## 2023-03-07 RX ADMIN — Medication 15 MILLIGRAM(S): at 03:21

## 2023-03-07 RX ADMIN — Medication 100 GRAM(S): at 09:25

## 2023-03-07 RX ADMIN — Medication 100 MILLIGRAM(S): at 20:51

## 2023-03-07 RX ADMIN — Medication 100 MILLIGRAM(S): at 23:12

## 2023-03-07 RX ADMIN — Medication 100 MILLIGRAM(S): at 10:20

## 2023-03-07 RX ADMIN — DIPHENHYDRAMINE HYDROCHLORIDE AND LIDOCAINE HYDROCHLORIDE AND ALUMINUM HYDROXIDE AND MAGNESIUM HYDRO 10 MILLILITER(S): KIT at 20:52

## 2023-03-07 RX ADMIN — Medication 1 TABLET(S): at 11:33

## 2023-03-07 RX ADMIN — Medication 675 MILLIGRAM(S): at 22:00

## 2023-03-07 RX ADMIN — DIPHENHYDRAMINE HYDROCHLORIDE AND LIDOCAINE HYDROCHLORIDE AND ALUMINUM HYDROXIDE AND MAGNESIUM HYDRO 10 MILLILITER(S): KIT at 09:31

## 2023-03-07 RX ADMIN — Medication 15 MILLIGRAM(S): at 02:41

## 2023-03-07 RX ADMIN — DIPHENHYDRAMINE HYDROCHLORIDE AND LIDOCAINE HYDROCHLORIDE AND ALUMINUM HYDROXIDE AND MAGNESIUM HYDRO 10 MILLILITER(S): KIT at 13:00

## 2023-03-07 RX ADMIN — Medication 100 MILLIGRAM(S): at 12:57

## 2023-03-07 RX ADMIN — HEPARIN SODIUM 5000 UNIT(S): 5000 INJECTION INTRAVENOUS; SUBCUTANEOUS at 17:07

## 2023-03-07 RX ADMIN — SODIUM CHLORIDE 3 MILLILITER(S): 9 INJECTION INTRAMUSCULAR; INTRAVENOUS; SUBCUTANEOUS at 13:03

## 2023-03-07 RX ADMIN — Medication 650 MILLIGRAM(S): at 09:31

## 2023-03-07 NOTE — PROGRESS NOTE ADULT - SUBJECTIVE AND OBJECTIVE BOX
Interventional Radiology Follow-Up Note    This is a 42y Female s/p pelvic abscess drain placement on 3/5 in Interventional Radiology with Dr. Morales.     S:     Medication:     cefoTEtan  IVPB: (03-06)  doxycycline IVPB: (03-06)  heparin   Injectable: (03-07)  metroNIDAZOLE  IVPB: (03-07)    Vitals:   T(F): 98, Max: 98.4 (17:00)  HR: 88  BP: 101/68  RR: 18  SpO2: 95%    Physical Exam:  General: Nontoxic, in NAD.   Abdomen: soft, NTND, no peritoneal signs.  Drain Device: Drain intact attached to gravity bag. Dressing clean, dry, intact.    24hr Drain output: 10cc; Flushed with 5cc NS without resistance, leaking or pain at the site     LABS:  WBC 15.15 / Hgb 9.3 / Hct 29.9 / Plt 452  Na 137 / K 3.5 / CO2 26 / Cl 101 / BUN 8 / Cr 0.40 / Glucose 109  ALT -- / AST -- / Alk Phos -- / Tbili --  Ptt 39.2 / Pt 20.9 / INR 1.81    Culture - Abscess with Gram Stain (03.05.23 @ 15:35)    Gram Stain: Rare polymorphonuclear leukocytes per low power field  Moderate Gram Positive Cocci in Pairs and Chains per oil power field    Specimen Source: .Abscess L tubo-ovarian abscess        Assessment/Plan: 41yo G0 LMP 3/1 HD#3 for treatment of TOA. Patient p/w acute worsening of LLQ, tvus w/ enlarged left ovary with complex cystic lesion. Hypotensive and tachycardic upon admission, requiring SICU care. No improvement in leukocytosis; pt now s/p IR drain placement.     -Continue to monitor and record drain output.   -f/u culture results  -abx per primary team- currently on doxy, flagyl and cefotetan  -continue global management per primary team  -monitor h/h; transfuse as needed  -trend vs/labs/output  -if output <10cc in 24 hrs for two consecutive days consider repeat imaging  -flush drain with 5cc NS daily forward only; DO NOT aspirate  -change dressing q3 days or when dressing is saturated  -regarding outpatient follow up with IR, if the patient is d/c home with drainage catheter they can make an appointment with IR by calling the IR booking office at [phone]; recommend IR follow in 2 weeks for tube evaluation.  - Greater than 50% of the encounter was spent counseling and/or coordination of care on drain management and follow up.   -they will benefit from VNS service to help with drainage catheter care; they should continue same drainage catheter care as an outpatient.  -Please call IR at extension [phone] with any questions, concerns, or issues regarding above.      Kiara Bueno PA-C  Available on teams     Interventional Radiology Follow-Up Note    This is a 42y Female s/p pelvic abscess drain placement on 3/5 in Interventional Radiology with Dr. Morales.     S: patient seen and examined at bedside. Offers no complaints at this time.     Medication:  cefoTEtan  IVPB: (03-06)  doxycycline IVPB: (03-06)  heparin   Injectable: (03-07)  metroNIDAZOLE  IVPB: (03-07)    Vitals:   T(F): 98, Max: 98.4 (17:00)  HR: 88  BP: 101/68  RR: 18  SpO2: 95%    Physical Exam:  General: Nontoxic, in NAD.   Abdomen: soft, NTND, no peritoneal signs.  Drain Device: Drain intact attached to gravity bag. Dressing clean, dry, intact.    24hr Drain output: 10cc; Flushed with 5cc NS without resistance, leaking or pain at the site     LABS:  WBC 15.15 / Hgb 9.3 / Hct 29.9 / Plt 452  Na 137 / K 3.5 / CO2 26 / Cl 101 / BUN 8 / Cr 0.40 / Glucose 109  ALT -- / AST -- / Alk Phos -- / Tbili --  Ptt 39.2 / Pt 20.9 / INR 1.81    Culture - Abscess with Gram Stain (03.05.23 @ 15:35)    Gram Stain: Rare polymorphonuclear leukocytes per low power field  Moderate Gram Positive Cocci in Pairs and Chains per oil power field    Specimen Source: .Abscess L tubo-ovarian abscess      Assessment/Plan: 41yo G0 LMP 3/1 HD#3 for treatment of TOA. Patient p/w acute worsening of LLQ, tvus w/ enlarged left ovary with complex cystic lesion. Hypotensive and tachycardic upon admission, requiring SICU care. No improvement in leukocytosis; pt now s/p IR drain placement.     - Will follow output 1 more day, if output <10cc in 24 hrs recommend obtaining repeat CT abd/pelv non con to assess collection.   -Continue to monitor and record drain output.   -f/u culture results  -abx per primary team- currently on doxy, flagyl and cefotetan  -continue global management per primary team  -monitor h/h; transfuse as needed  -trend vs/labs/output  -flush drain with 5cc NS daily forward only; DO NOT aspirate  -change dressing q3 days or when dressing is saturated  -regarding outpatient follow up with IR, if the patient is d/c home with drainage catheter they can make an appointment with IR by calling the IR booking office at [phone]; recommend IR follow in 2 weeks for tube evaluation.  - Greater than 50% of the encounter was spent counseling and/or coordination of care on drain management and follow up.   -they will benefit from VNS service to help with drainage catheter care; they should continue same drainage catheter care as an outpatient.  -Please call IR at extension [phone] with any questions, concerns, or issues regarding above.      Kiara Bueno PA-C  Available on teams

## 2023-03-07 NOTE — CHART NOTE - NSCHARTNOTEFT_GEN_A_CORE
At bedside for PM evaluation.     Pt feeling well overall. Pain is well controlled. Pt endorsing unwitnessed episode of "spit up" after eating two macaroons. Otherwise tolerated part of a muffin today. Pt ambulating without difficulty.     Objective:  T(F): 98.5 (03-07-23 @ 17:00), Max: 98.5 (03-07-23 @ 17:00)  HR: 84 (03-07-23 @ 17:00) (84 - 97)  BP: 104/72 (03-07-23 @ 17:00) (98/64 - 112/77)  RR: 18 (03-07-23 @ 17:00) (16 - 18)  SpO2: 97% (03-07-23 @ 17:00) (95% - 97%)  Wt(kg): --  I&O's Summary    06 Mar 2023 07:01  -  07 Mar 2023 07:00  --------------------------------------------------------  IN: 1502.5 mL / OUT: 840 mL / NET: 662.5 mL    07 Mar 2023 07:01  -  07 Mar 2023 17:31  --------------------------------------------------------  IN: 300 mL / OUT: 220 mL / NET: 80 mL      CAPILLARY BLOOD GLUCOSE          MEDICATIONS  (STANDING):  cefoTEtan  IVPB 2 Gram(s) IV Intermittent every 12 hours  chlorhexidine 2% Cloths 1 Application(s) Topical daily  doxycycline IVPB 100 milliGRAM(s) IV Intermittent every 12 hours  famotidine Injectable 20 milliGRAM(s) IV Push daily  heparin   Injectable 5000 Unit(s) SubCutaneous every 12 hours  lactated ringers. 1000 milliLiter(s) (30 mL/Hr) IV Continuous <Continuous>  lactobacillus acidophilus 1 Tablet(s) Oral daily  metroNIDAZOLE  IVPB 500 milliGRAM(s) IV Intermittent every 8 hours  sodium chloride 0.9% lock flush 3 milliLiter(s) IV Push every 8 hours    MEDICATIONS  (PRN):  acetaminophen     Tablet .. 650 milliGRAM(s) Oral every 6 hours PRN Mild Pain (1 - 3)  aluminum hydroxide/magnesium hydroxide/simethicone Suspension 30 milliLiter(s) Oral every 4 hours PRN Dyspepsia  FIRST- Mouthwash  BLM 10 milliLiter(s) Swish and Spit every 6 hours PRN Mouth Care  ibuprofen  Tablet. 600 milliGRAM(s) Oral every 6 hours PRN Mild Pain (1 - 3), Moderate Pain (4 - 6)  ondansetron Injectable 4 milliGRAM(s) IV Push every 6 hours PRN Nausea and/or Vomiting      Gen: alert, oriented  CVS: RRR  Lungs: CTAB  Abdomen: Soft, mildy tender, LLQ abdominal drain c/d/i.     LABS:  03-07    137    |  101    |  8      ----------------------------<  109<H>  3.5     |  26     |  0.40<L>    Ca    8.8        07 Mar 2023 06:35          PT/INR - ( 07 Mar 2023 06:35 )   PT: 20.9 sec;   INR: 1.81 ratio         PTT - ( 07 Mar 2023 06:35 )  PTT:39.2 sec    43yo G0 LMP 3/1 HD#5 for treatment of TOA. Patient p/w acute worsening of LLQ, tvus w/ enlarged left ovary with complex cystic lesion. Hypotensive and tachycardic upon admission, requiring SICU care. Now s/p IR drainage of 135cc purulent fluid (3/5). Clinically patient appears stable at this time. VSS at this time. Patient in no acute distress and afebrile.     - LR@30 overnight  - Continue IV abx, transition in AM    Shiloh Eaton, PGY2  Pt seen and examined with Dr. Becerra PGY4

## 2023-03-07 NOTE — PROGRESS NOTE ADULT - SUBJECTIVE AND OBJECTIVE BOX
GYN Progress Note HD#5    Pt seen and examined at bedside in NAD. Pain well controlled. Pt able to ambulate, void spontaneously. Pt having loose bowel movements, decreasing in frequency. Nausea improved overnight. Pt able to tolerate crackers.  Denies shortness of breath. Patient denies lightheadedness chest pain, fevers, chills, diarrhea.    Vital Signs Last 24 Hours  T(C): 36.7 (03-07-23 @ 05:14), Max: 36.9 (03-06-23 @ 17:00)  HR: 88 (03-07-23 @ 05:14) (87 - 100)  BP: 101/68 (03-07-23 @ 05:14) (95/64 - 112/77)  RR: 18 (03-07-23 @ 05:14) (18 - 18)  SpO2: 95% (03-07-23 @ 05:14) (95% - 98%)    I&O's Summary    05 Mar 2023 07:01  -  06 Mar 2023 07:00  --------------------------------------------------------  IN: 1033.2 mL / OUT: 475 mL / NET: 558.2 mL    06 Mar 2023 07:01  -  07 Mar 2023 06:17  --------------------------------------------------------  IN: 1502.5 mL / OUT: 840 mL / NET: 662.5 mL    Gen: alert, oriented  CVS: RRR  Lungs: CTAB  Abdomen: Soft, mildy tender, LLQ abdominal drain c/d/i.     Labs:                        7.1    15.66 )-----------( 281      ( 06 Mar 2023 06:43 )             23.0   baso 0.1    eos 0.1    imm gran 0.5    lymph 8.2    mono 2.5    poly 88.6                         7.6    14.95 )-----------( 302      ( 05 Mar 2023 13:36 )             24.4   baso x      eos x      imm gran x      lymph x      mono x      poly x                            7.9    15.75 )-----------( 296      ( 05 Mar 2023 00:48 )             25.7   baso x      eos x      imm gran x      lymph x      mono x      poly x          MEDICATIONS  (STANDING):  cefoTEtan  IVPB 2 Gram(s) IV Intermittent every 12 hours  chlorhexidine 2% Cloths 1 Application(s) Topical daily  doxycycline IVPB 100 milliGRAM(s) IV Intermittent every 12 hours  famotidine Injectable 20 milliGRAM(s) IV Push daily  heparin   Injectable 5000 Unit(s) SubCutaneous every 12 hours  lactated ringers. 1000 milliLiter(s) (50 mL/Hr) IV Continuous <Continuous>  lactobacillus acidophilus 1 Tablet(s) Oral daily  metroNIDAZOLE  IVPB 500 milliGRAM(s) IV Intermittent every 8 hours    MEDICATIONS  (PRN):  acetaminophen     Tablet .. 650 milliGRAM(s) Oral every 6 hours PRN Mild Pain (1 - 3)  aluminum hydroxide/magnesium hydroxide/simethicone Suspension 30 milliLiter(s) Oral every 4 hours PRN Dyspepsia  FIRST- Mouthwash  BLM 10 milliLiter(s) Swish and Spit every 6 hours PRN Mouth Care  ketorolac   Injectable 15 milliGRAM(s) IV Push every 6 hours PRN Moderate Pain (4 - 6)  ondansetron Injectable 4 milliGRAM(s) IV Push every 6 hours PRN Nausea and/or Vomiting

## 2023-03-08 LAB
APTT BLD: 41 SEC — HIGH (ref 27.5–35.5)
BASOPHILS # BLD AUTO: 0.03 K/UL — SIGNIFICANT CHANGE UP (ref 0–0.2)
BASOPHILS NFR BLD AUTO: 0.3 % — SIGNIFICANT CHANGE UP (ref 0–2)
CULTURE RESULTS: SIGNIFICANT CHANGE UP
CULTURE RESULTS: SIGNIFICANT CHANGE UP
EOSINOPHIL # BLD AUTO: 0.35 K/UL — SIGNIFICANT CHANGE UP (ref 0–0.5)
EOSINOPHIL NFR BLD AUTO: 3 % — SIGNIFICANT CHANGE UP (ref 0–6)
FIBRINOGEN PPP-MCNC: 451 MG/DL — HIGH (ref 200–445)
HCT VFR BLD CALC: 24.7 % — LOW (ref 34.5–45)
HGB BLD-MCNC: 7.8 G/DL — LOW (ref 11.5–15.5)
IMM GRANULOCYTES NFR BLD AUTO: 0.7 % — SIGNIFICANT CHANGE UP (ref 0–0.9)
INR BLD: 1.84 RATIO — HIGH (ref 0.88–1.16)
LYMPHOCYTES # BLD AUTO: 1.62 K/UL — SIGNIFICANT CHANGE UP (ref 1–3.3)
LYMPHOCYTES # BLD AUTO: 13.7 % — SIGNIFICANT CHANGE UP (ref 13–44)
MCHC RBC-ENTMCNC: 22.7 PG — LOW (ref 27–34)
MCHC RBC-ENTMCNC: 31.6 GM/DL — LOW (ref 32–36)
MCV RBC AUTO: 71.8 FL — LOW (ref 80–100)
MONOCYTES # BLD AUTO: 0.97 K/UL — HIGH (ref 0–0.9)
MONOCYTES NFR BLD AUTO: 8.2 % — SIGNIFICANT CHANGE UP (ref 2–14)
NEUTROPHILS # BLD AUTO: 8.81 K/UL — HIGH (ref 1.8–7.4)
NEUTROPHILS NFR BLD AUTO: 74.1 % — SIGNIFICANT CHANGE UP (ref 43–77)
NRBC # BLD: 0 /100 WBCS — SIGNIFICANT CHANGE UP (ref 0–0)
PLATELET # BLD AUTO: 390 K/UL — SIGNIFICANT CHANGE UP (ref 150–400)
PROTHROM AB SERPL-ACNC: 21.5 SEC — HIGH (ref 10.5–13.4)
RBC # BLD: 3.44 M/UL — LOW (ref 3.8–5.2)
RBC # FLD: 16.6 % — HIGH (ref 10.3–14.5)
SPECIMEN SOURCE: SIGNIFICANT CHANGE UP
SPECIMEN SOURCE: SIGNIFICANT CHANGE UP
WBC # BLD: 11.86 K/UL — HIGH (ref 3.8–10.5)
WBC # FLD AUTO: 11.86 K/UL — HIGH (ref 3.8–10.5)

## 2023-03-08 PROCEDURE — 99231 SBSQ HOSP IP/OBS SF/LOW 25: CPT

## 2023-03-08 PROCEDURE — 99232 SBSQ HOSP IP/OBS MODERATE 35: CPT | Mod: GC

## 2023-03-08 RX ADMIN — Medication 100 MILLIGRAM(S): at 05:18

## 2023-03-08 RX ADMIN — HEPARIN SODIUM 5000 UNIT(S): 5000 INJECTION INTRAVENOUS; SUBCUTANEOUS at 05:18

## 2023-03-08 RX ADMIN — SODIUM CHLORIDE 3 MILLILITER(S): 9 INJECTION INTRAMUSCULAR; INTRAVENOUS; SUBCUTANEOUS at 05:29

## 2023-03-08 RX ADMIN — Medication 650 MILLIGRAM(S): at 12:08

## 2023-03-08 RX ADMIN — DIPHENHYDRAMINE HYDROCHLORIDE AND LIDOCAINE HYDROCHLORIDE AND ALUMINUM HYDROXIDE AND MAGNESIUM HYDRO 10 MILLILITER(S): KIT at 08:50

## 2023-03-08 RX ADMIN — HEPARIN SODIUM 5000 UNIT(S): 5000 INJECTION INTRAVENOUS; SUBCUTANEOUS at 17:58

## 2023-03-08 RX ADMIN — Medication 650 MILLIGRAM(S): at 17:58

## 2023-03-08 RX ADMIN — Medication 1 TABLET(S): at 10:18

## 2023-03-08 RX ADMIN — Medication 600 MILLIGRAM(S): at 09:13

## 2023-03-08 RX ADMIN — Medication 600 MILLIGRAM(S): at 09:43

## 2023-03-08 RX ADMIN — Medication 600 MILLIGRAM(S): at 20:04

## 2023-03-08 RX ADMIN — SODIUM CHLORIDE 3 MILLILITER(S): 9 INJECTION INTRAMUSCULAR; INTRAVENOUS; SUBCUTANEOUS at 22:00

## 2023-03-08 RX ADMIN — Medication 600 MILLIGRAM(S): at 15:06

## 2023-03-08 RX ADMIN — Medication 600 MILLIGRAM(S): at 15:36

## 2023-03-08 RX ADMIN — DIPHENHYDRAMINE HYDROCHLORIDE AND LIDOCAINE HYDROCHLORIDE AND ALUMINUM HYDROXIDE AND MAGNESIUM HYDRO 10 MILLILITER(S): KIT at 20:05

## 2023-03-08 RX ADMIN — Medication 650 MILLIGRAM(S): at 04:01

## 2023-03-08 RX ADMIN — SODIUM CHLORIDE 3 MILLILITER(S): 9 INJECTION INTRAMUSCULAR; INTRAVENOUS; SUBCUTANEOUS at 12:00

## 2023-03-08 RX ADMIN — Medication 600 MILLIGRAM(S): at 20:34

## 2023-03-08 RX ADMIN — Medication 1 TABLET(S): at 21:49

## 2023-03-08 RX ADMIN — FAMOTIDINE 20 MILLIGRAM(S): 10 INJECTION INTRAVENOUS at 12:07

## 2023-03-08 RX ADMIN — Medication 1 TABLET(S): at 12:08

## 2023-03-08 RX ADMIN — Medication 650 MILLIGRAM(S): at 12:38

## 2023-03-08 NOTE — PROGRESS NOTE ADULT - ASSESSMENT
43yo G0 LMP 3/1 HD#5 for treatment of TOA. Patient p/w acute worsening of LLQ, tvus w/ enlarged left ovary with complex cystic lesion. Hypotensive and tachycardic upon admission, requiring SICU care. Now s/p IR drainage of 135cc purulent fluid (3/5). Clinically patient appears stable at this time. VSS at this time. Patient in no acute distress and afebrile.     Neuro: Pain well controlled. PO Tylenol. Not requiring additional pain meds.   CV: Hemodynamically stable,  AM CBC, BMP, Coags  Pulm: Saturating well on room air, encourage oob/amb  GI: Regular diet, Zofran PRN for nausea  : Voiding spontaneously   #TOA  - CTAP @ OSH 3/2: Left adnexal mass measuring 9.0 cm with areas of higher density and septations peripherally. Infiltration of the fat adjacent to the mass. Multiple lymph nodes adjacent to the mass including a representative lymph node measuring 8 mm.  - TVUS 3/3: Enlarged left ovary with 7.6 x 4.7 x 5.8 cm cystic structure and central soft tissue components; likely a hemorrhagic cyst. Ovarian cystic neoplasm cannot be excluded. Small volume free fluid in cul-de-sac.  - IV antibiotics: cefotetan qD, doxy, flagy BID (3/3- ); last febrile at 3/3 @ 23:00 38.0 *C  - Transition it PO abx today   - IR recs: drainage of 135cc purulent output. LLQ drain placed.   - MRI (3/3): 9cm left tubo ovarian abscess. No acute abdominal findings.   -Syphilis, Hep A, Hep B and Hep C serologies neg (3/3), GC/CT negative   - lactate wnl, coags elevated. will repeat PRN  - BCx NGTD  - Abscess culture growing e coli and strep anginosus sensitive to multiple antibiotics   Heme: Ambulation and SCDs for DVT ppx   - HSQ   FEN: SLIV  - s/p 3L LR in ED due to hypotension/tachycardia  ID: Afebrile  - c/w IV doxy (3/3-), IV flagyl (3/3-), IV cefotetan (3/3-). Consider transitioning to PO regimen.   Endo: No active issues   Dispo: Continue inpatient care     Shiloh Eaton, PGY2 43yo G0 LMP 3/1 HD#6 for treatment of TOA. Patient p/w acute worsening of LLQ, tvus w/ enlarged left ovary with complex cystic lesion. Hypotensive and tachycardic upon admission, requiring SICU care. Now s/p IR drainage of 135cc purulent fluid (3/5). Clinically patient appears stable at this time. VSS at this time. Patient in no acute distress and afebrile.     Neuro: Pain well controlled. PO Tylenol. Not requiring additional pain meds.   CV: Hemodynamically stable,  AM CBC, BMP, Coags  Pulm: Saturating well on room air, encourage oob/amb  GI: Regular diet, Zofran PRN for nausea  : Voiding spontaneously   #TOA  - CTAP @ OSH 3/2: Left adnexal mass measuring 9.0 cm with areas of higher density and septations peripherally. Infiltration of the fat adjacent to the mass. Multiple lymph nodes adjacent to the mass including a representative lymph node measuring 8 mm.  - TVUS 3/3: Enlarged left ovary with 7.6 x 4.7 x 5.8 cm cystic structure and central soft tissue components; likely a hemorrhagic cyst. Ovarian cystic neoplasm cannot be excluded. Small volume free fluid in cul-de-sac.  - IV antibiotics: cefotetan qD, doxy, flagy BID (3/3- ); last febrile at 3/3 @ 23:00 38.0 *C  - Transition it PO abx today   - IR recs: drainage of 135cc purulent output. LLQ drain placed.   - MRI (3/3): 9cm left tubo ovarian abscess. No acute abdominal findings.   -Syphilis, Hep A, Hep B and Hep C serologies neg (3/3), GC/CT negative   - lactate wnl, coags elevated. will repeat PRN  - BCx NGTD  - Abscess culture growing e coli and strep anginosus sensitive to multiple antibiotics   Heme: Ambulation and SCDs for DVT ppx   - HSQ   FEN: SLIV  - s/p 3L LR in ED due to hypotension/tachycardia  ID: Afebrile  - c/w IV doxy (3/3-), IV flagyl (3/3-), IV cefotetan (3/3-). Consider transitioning to PO regimen.   Endo: No active issues   Dispo: Continue inpatient care     Shiloh Eaton, PGY2

## 2023-03-08 NOTE — CHART NOTE - NSCHARTNOTEFT_GEN_A_CORE
R1 Chart Note- PM Rounds  At bedside for PM evaluation.     Pt feeling well overall. Pain is well controlled. Pt noted to be ambulating in the hallway during the afternoon without difficulty. Pt reported tolerating cheddar broccoli soup for lunch. Denies nausea, vomiting, fevers, chills.    Objective:  T(F): 98.5 (03-07-23 @ 17:00), Max: 98.5 (03-07-23 @ 17:00)  HR: 84 (03-07-23 @ 17:00) (84 - 97)  BP: 104/72 (03-07-23 @ 17:00) (98/64 - 112/77)  RR: 18 (03-07-23 @ 17:00) (16 - 18)  SpO2: 97% (03-07-23 @ 17:00) (95% - 97%)  Wt(kg): --  I&O's Summary    06 Mar 2023 07:01  -  07 Mar 2023 07:00  --------------------------------------------------------  IN: 1502.5 mL / OUT: 840 mL / NET: 662.5 mL    07 Mar 2023 07:01  -  07 Mar 2023 17:31  --------------------------------------------------------  IN: 300 mL / OUT: 220 mL / NET: 80 mL      CAPILLARY BLOOD GLUCOSE          MEDICATIONS  (STANDING):  cefoTEtan  IVPB 2 Gram(s) IV Intermittent every 12 hours  chlorhexidine 2% Cloths 1 Application(s) Topical daily  doxycycline IVPB 100 milliGRAM(s) IV Intermittent every 12 hours  famotidine Injectable 20 milliGRAM(s) IV Push daily  heparin   Injectable 5000 Unit(s) SubCutaneous every 12 hours  lactated ringers. 1000 milliLiter(s) (30 mL/Hr) IV Continuous <Continuous>  lactobacillus acidophilus 1 Tablet(s) Oral daily  metroNIDAZOLE  IVPB 500 milliGRAM(s) IV Intermittent every 8 hours  sodium chloride 0.9% lock flush 3 milliLiter(s) IV Push every 8 hours    MEDICATIONS  (PRN):  acetaminophen     Tablet .. 650 milliGRAM(s) Oral every 6 hours PRN Mild Pain (1 - 3)  aluminum hydroxide/magnesium hydroxide/simethicone Suspension 30 milliLiter(s) Oral every 4 hours PRN Dyspepsia  FIRST- Mouthwash  BLM 10 milliLiter(s) Swish and Spit every 6 hours PRN Mouth Care  ibuprofen  Tablet. 600 milliGRAM(s) Oral every 6 hours PRN Mild Pain (1 - 3), Moderate Pain (4 - 6)  ondansetron Injectable 4 milliGRAM(s) IV Push every 6 hours PRN Nausea and/or Vomiting      Gen: alert, oriented  CVS: RRR  Lungs: CTAB  Abdomen: Soft, mildy tender, LLQ abdominal drain draining minimal serosanguinous fluid    LABS:  03-07    137    |  101    |  8      ----------------------------<  109<H>  3.5     |  26     |  0.40<L>    Ca    8.8        07 Mar 2023 06:35          PT/INR - ( 07 Mar 2023 06:35 )   PT: 20.9 sec;   INR: 1.81 ratio         PTT - ( 07 Mar 2023 06:35 )  PTT:39.2 sec    43yo G0 LMP 3/1 HD#6 for treatment of TOA. Patient p/w acute worsening of LLQ, tvus w/ enlarged left ovary with complex cystic lesion. Hypotensive and tachycardic upon admission, requiring SICU care. Now s/p IR drainage of 135cc purulent fluid (3/5). Clinically patient appears stable at this time. VSS at this time. Patient in no acute distress and afebrile.     - Continue PO Bactrim  - Discussed w/ pt that she will likely go home with abdominal drain and it will be removed by IR outpatient  - Plan for potential d/c tomorrow    Trinh Burroughs PGY1

## 2023-03-08 NOTE — CHART NOTE - NSCHARTNOTESELECT_GEN_ALL_CORE
GYN Overnight Rounds
Incidental finding report/Event Note
GYN PM Round
GYN PM Rounds
GYN PM Rounds Resident
GYN SICU Round

## 2023-03-08 NOTE — PHYSICAL THERAPY INITIAL EVALUATION ADULT - PERTINENT HX OF CURRENT PROBLEM, REHAB EVAL
41yo admitted to [facility] on 3/3/23  G0 LMP 3/1 presented w/ acute worsening of LLQ, tvus w/  enlarged left ovary with complex cystic lesion, likely a hemorrhagic cyst vs ovarian cystic neoplasm cannot be excluded. VS significant for hypotension and tachycardia, leukocytosis to 17. Clinically patient appears septic with possible TOA, however cannot r/o alternative sources of infection and/or malignant process at this time:
41yo admitted to [facility] on 3/3/23  G0 LMP 3/1 presented w/ acute worsening of LLQ, tvus w/  enlarged left ovary with complex cystic lesion, likely a hemorrhagic cyst vs ovarian cystic neoplasm cannot be excluded. VS significant for hypotension and tachycardia, leukocytosis to 17. Clinically patient appears septic with possible TOA, however cannot r/o alternative sources of infection and/or malignant process at this time:

## 2023-03-08 NOTE — PROGRESS NOTE ADULT - PA/NP ONLY VISIT
Her letter is done and it is in the portal.   She will need to check with travel sites, they may require testing before travel.   Viktoria Anne MD PA/NP only visit

## 2023-03-08 NOTE — PHYSICAL THERAPY INITIAL EVALUATION ADULT - PHYSICAL ASSIST/NONPHYSICAL ASSIST: STAND/SIT, REHAB EVAL
supervision
Render Risk Assessment In Note?: no
Detail Level: Simple
Additional Notes: Patient states she was recently told she might have thyroid issues. Will have her continue to see her PCP to monitor her blood work and treatment.

## 2023-03-08 NOTE — PHYSICAL THERAPY INITIAL EVALUATION ADULT - PLANNED THERAPY INTERVENTIONS, PT EVAL
stairs  GOAL: Pt will negotiate up/down 3 steps with handrail ascending independently in 2 weeks/bed mobility training/gait training/transfer training

## 2023-03-08 NOTE — PHYSICAL THERAPY INITIAL EVALUATION ADULT - NSACTIVITYREC_GEN_A_PT
reviewed coordinated breathing with transfers to reduce pain,   bed therex: SAQ, ankle pumps  pelvic tilts in sitting and pelvic neutral with ambulation

## 2023-03-08 NOTE — PROGRESS NOTE ADULT - SUBJECTIVE AND OBJECTIVE BOX
Interventional Radiology Follow-Up Note    Patient seen and examined @ bedside     This is a 42y Female s/p pelvic abscess drain placement on 3/5 in Interventional Radiology with Dr. Morales.     No complaint offered.    Medication:  cefoTEtan  IVPB: (03-07)  doxycycline IVPB: (03-07)  heparin   Injectable: (03-08)  metroNIDAZOLE  IVPB: (03-08)  trimethoprim  160 mG/sulfamethoxazole 800 mG: (03-08)    Vitals:  T(F): 97.9, Max: 99 (21:00)  HR: 88  BP: 104/71  RR: 18  SpO2: 96%    Physical Exam:  General: Nontoxic, in NAD.   Abdomen: soft, NTND, no peritoneal signs.  Drain Device: Drain intact attached to gravity bag. Dressing clean, dry, intact.  24hr Drain output: 45cc; Flushed with 10cc NS without resistance, leaking or pain at the site       LABS:  Na: 137 (03-07 @ 06:35), 140 (03-05 @ 13:36)  K: 3.5 (03-07 @ 06:35), 3.9 (03-05 @ 13:36)  Cl: 101 (03-07 @ 06:35), 108 (03-05 @ 13:36)  CO2: 26 (03-07 @ 06:35), 19 (03-05 @ 13:36)  BUN: 8 (03-07 @ 06:35), 7 (03-05 @ 13:36)  Cr: 0.40 (03-07 @ 06:35), 0.42 (03-05 @ 13:36)  Glu: 109(03-07 @ 06:35), 86(03-05 @ 13:36)  Hgb: 7.8 (03-08 @ 06:34), 9.3 (03-07 @ 06:35), 7.1 (03-06 @ 06:43), 7.6 (03-05 @ 13:36)  Hct: 24.7 (03-08 @ 06:34), 29.9 (03-07 @ 06:35), 23.0 (03-06 @ 06:43), 24.4 (03-05 @ 13:36)  WBC: 11.86 (03-08 @ 06:34), 15.15 (03-07 @ 06:35), 15.66 (03-06 @ 06:43), 14.95 (03-05 @ 13:36)  Plt: 390 (03-08 @ 06:34), 452 (03-07 @ 06:35), 281 (03-06 @ 06:43), 302 (03-05 @ 13:36)  INR: 1.84 03-08-23 @ 06:32, 1.81 03-07-23 @ 06:35, 1.58 03-06-23 @ 06:44, 1.77 03-05-23 @ 13:36  PTT: 41.0 03-08-23 @ 06:32, 39.2 03-07-23 @ 06:35, 34.0 03-06-23 @ 06:44, 33.7 03-05-23 @ 13:36  Bilirubin Total, Serum: 0.2 mg/dL (03-05-23 @ 13:36)  Bilirubin Total, Serum: 0.2 mg/dL (03-05-23 @ 00:48)  Bilirubin Total, Serum: 0.3 mg/dL (03-04-23 @ 04:24)  Bilirubin Total, Serum: 0.5 mg/dL (03-03-23 @ 19:21)      Assessment/Plan: 42 year old G0 LMP 3/1 HD#3 for treatment of TOA. Patient p/w acute worsening of LLQ, tvus w/ enlarged left ovary with complex cystic lesion. Hypotensive and tachycardic upon admission, requiring SICU care. No improvement in leukocytosis; pt now s/p IR drain placement.     -  if output <10cc in 24 hrs recommend obtaining repeat CT abd/pelvis non con to assess collection.   - Continue to monitor and record drain output.   -f/u culture results  -abx per primary team- currently on doxy, flagyl and cefotetan  -continue global management per primary team  -monitor h/h; transfuse as needed  -trend vs/labs/output  -flush drain with 5cc NS daily forward only; DO NOT aspirate  -change dressing q3 days or when dressing is saturated  -regarding outpatient follow up with IR, if the patient is d/c home with drainage catheter they can make an appointment with IR by calling the IR booking office at [phone]; recommend IR follow in 2 weeks for tube evaluation.  -they will benefit from VNS service to help with drainage catheter care; they should continue same drainage catheter care as an outpatient.  -Please call IR at extension [phone] with any questions, concerns, or issues regarding above.     Also available on TEAMS

## 2023-03-08 NOTE — PROGRESS NOTE ADULT - SUBJECTIVE AND OBJECTIVE BOX
GYN Progress Note HD#6    Pt seen and examined at bedside in NAD. Pain well controlled. Pt able to ambulate, void spontaneously. Pt having bowel movements. No nausea or vomiting overnight. Tolerating small amounts of bland food.   Denies shortness of breath. Patient denies lightheadedness chest pain, fevers, chills, diarrhea.    Vital Signs Last 24 Hours  T(C): 36.9 (03-08-23 @ 05:07), Max: 37.2 (03-07-23 @ 21:00)  HR: 94 (03-08-23 @ 05:07) (84 - 97)  BP: 103/71 (03-08-23 @ 05:07) (100/66 - 107/72)  RR: 18 (03-08-23 @ 05:07) (16 - 18)  SpO2: 97% (03-08-23 @ 05:07) (95% - 97%)    I&O's Summary    06 Mar 2023 07:01  -  07 Mar 2023 07:00  --------------------------------------------------------  IN: 1502.5 mL / OUT: 840 mL / NET: 662.5 mL    07 Mar 2023 07:01  -  08 Mar 2023 06:48  --------------------------------------------------------  IN: 800 mL / OUT: 220 mL / NET: 580 mL    Gen: alert, oriented  CVS: RRR  Lungs: CTAB  Abdomen: Soft, mildy tender, LLQ abdominal drain c/d/i.     Labs:                        9.3    15.15 )-----------( 452      ( 07 Mar 2023 06:35 )             29.9   baso 0.3    eos 1.8    imm gran 0.7    lymph 10.9   mono 7.4    poly 78.9                         7.1    15.66 )-----------( 281      ( 06 Mar 2023 06:43 )             23.0   baso 0.1    eos 0.1    imm gran 0.5    lymph 8.2    mono 2.5    poly 88.6                         7.6    14.95 )-----------( 302      ( 05 Mar 2023 13:36 )             24.4   baso x      eos x      imm gran x      lymph x      mono x      poly x          MEDICATIONS  (STANDING):  cefoTEtan  IVPB 2 Gram(s) IV Intermittent every 12 hours  chlorhexidine 2% Cloths 1 Application(s) Topical daily  doxycycline IVPB 100 milliGRAM(s) IV Intermittent every 12 hours  famotidine Injectable 20 milliGRAM(s) IV Push daily  heparin   Injectable 5000 Unit(s) SubCutaneous every 12 hours  lactated ringers. 1000 milliLiter(s) (30 mL/Hr) IV Continuous <Continuous>  lactobacillus acidophilus 1 Tablet(s) Oral daily  metroNIDAZOLE  IVPB 500 milliGRAM(s) IV Intermittent every 8 hours  sodium chloride 0.9% lock flush 3 milliLiter(s) IV Push every 8 hours    MEDICATIONS  (PRN):  acetaminophen     Tablet .. 650 milliGRAM(s) Oral every 6 hours PRN Mild Pain (1 - 3)  aluminum hydroxide/magnesium hydroxide/simethicone Suspension 30 milliLiter(s) Oral every 4 hours PRN Dyspepsia  FIRST- Mouthwash  BLM 10 milliLiter(s) Swish and Spit every 6 hours PRN Mouth Care  ibuprofen  Tablet. 600 milliGRAM(s) Oral every 6 hours PRN Mild Pain (1 - 3), Moderate Pain (4 - 6)  ondansetron Injectable 4 milliGRAM(s) IV Push every 6 hours PRN Nausea and/or Vomiting

## 2023-03-09 ENCOUNTER — TRANSCRIPTION ENCOUNTER (OUTPATIENT)
Age: 43
End: 2023-03-09

## 2023-03-09 VITALS
RESPIRATION RATE: 18 BRPM | OXYGEN SATURATION: 97 % | SYSTOLIC BLOOD PRESSURE: 94 MMHG | TEMPERATURE: 98 F | DIASTOLIC BLOOD PRESSURE: 67 MMHG | HEART RATE: 90 BPM

## 2023-03-09 DIAGNOSIS — N73.0 ACUTE PARAMETRITIS AND PELVIC CELLULITIS: ICD-10-CM

## 2023-03-09 LAB
BASOPHILS # BLD AUTO: 0.03 K/UL — SIGNIFICANT CHANGE UP (ref 0–0.2)
BASOPHILS NFR BLD AUTO: 0.2 % — SIGNIFICANT CHANGE UP (ref 0–2)
EOSINOPHIL # BLD AUTO: 0.21 K/UL — SIGNIFICANT CHANGE UP (ref 0–0.5)
EOSINOPHIL NFR BLD AUTO: 1.7 % — SIGNIFICANT CHANGE UP (ref 0–6)
HCT VFR BLD CALC: 24.4 % — LOW (ref 34.5–45)
HGB BLD-MCNC: 7.8 G/DL — LOW (ref 11.5–15.5)
IMM GRANULOCYTES NFR BLD AUTO: 0.9 % — SIGNIFICANT CHANGE UP (ref 0–0.9)
LYMPHOCYTES # BLD AUTO: 1.19 K/UL — SIGNIFICANT CHANGE UP (ref 1–3.3)
LYMPHOCYTES # BLD AUTO: 9.7 % — LOW (ref 13–44)
MCHC RBC-ENTMCNC: 22.6 PG — LOW (ref 27–34)
MCHC RBC-ENTMCNC: 32 GM/DL — SIGNIFICANT CHANGE UP (ref 32–36)
MCV RBC AUTO: 70.7 FL — LOW (ref 80–100)
MONOCYTES # BLD AUTO: 0.76 K/UL — SIGNIFICANT CHANGE UP (ref 0–0.9)
MONOCYTES NFR BLD AUTO: 6.2 % — SIGNIFICANT CHANGE UP (ref 2–14)
NEUTROPHILS # BLD AUTO: 10.03 K/UL — HIGH (ref 1.8–7.4)
NEUTROPHILS NFR BLD AUTO: 81.3 % — HIGH (ref 43–77)
NRBC # BLD: 0 /100 WBCS — SIGNIFICANT CHANGE UP (ref 0–0)
PLATELET # BLD AUTO: 406 K/UL — HIGH (ref 150–400)
RBC # BLD: 3.45 M/UL — LOW (ref 3.8–5.2)
RBC # FLD: 16.9 % — HIGH (ref 10.3–14.5)
WBC # BLD: 12.33 K/UL — HIGH (ref 3.8–10.5)
WBC # FLD AUTO: 12.33 K/UL — HIGH (ref 3.8–10.5)

## 2023-03-09 PROCEDURE — 86900 BLOOD TYPING SEROLOGIC ABO: CPT

## 2023-03-09 PROCEDURE — 84295 ASSAY OF SERUM SODIUM: CPT

## 2023-03-09 PROCEDURE — C1729: CPT

## 2023-03-09 PROCEDURE — 83735 ASSAY OF MAGNESIUM: CPT

## 2023-03-09 PROCEDURE — 80074 ACUTE HEPATITIS PANEL: CPT

## 2023-03-09 PROCEDURE — 96375 TX/PRO/DX INJ NEW DRUG ADDON: CPT

## 2023-03-09 PROCEDURE — 85027 COMPLETE CBC AUTOMATED: CPT

## 2023-03-09 PROCEDURE — 87635 SARS-COV-2 COVID-19 AMP PRB: CPT

## 2023-03-09 PROCEDURE — 82330 ASSAY OF CALCIUM: CPT

## 2023-03-09 PROCEDURE — 84702 CHORIONIC GONADOTROPIN TEST: CPT

## 2023-03-09 PROCEDURE — 87591 N.GONORRHOEAE DNA AMP PROB: CPT

## 2023-03-09 PROCEDURE — 86703 HIV-1/HIV-2 1 RESULT ANTBDY: CPT

## 2023-03-09 PROCEDURE — 82803 BLOOD GASES ANY COMBINATION: CPT

## 2023-03-09 PROCEDURE — C1769: CPT

## 2023-03-09 PROCEDURE — 80053 COMPREHEN METABOLIC PANEL: CPT

## 2023-03-09 PROCEDURE — 99238 HOSP IP/OBS DSCHRG MGMT 30/<: CPT | Mod: GC

## 2023-03-09 PROCEDURE — 83605 ASSAY OF LACTIC ACID: CPT

## 2023-03-09 PROCEDURE — 82435 ASSAY OF BLOOD CHLORIDE: CPT

## 2023-03-09 PROCEDURE — 85025 COMPLETE CBC W/AUTO DIFF WBC: CPT

## 2023-03-09 PROCEDURE — 87070 CULTURE OTHR SPECIMN AEROBIC: CPT

## 2023-03-09 PROCEDURE — 84100 ASSAY OF PHOSPHORUS: CPT

## 2023-03-09 PROCEDURE — 99285 EMERGENCY DEPT VISIT HI MDM: CPT | Mod: 25

## 2023-03-09 PROCEDURE — 86780 TREPONEMA PALLIDUM: CPT

## 2023-03-09 PROCEDURE — 87205 SMEAR GRAM STAIN: CPT

## 2023-03-09 PROCEDURE — 36415 COLL VENOUS BLD VENIPUNCTURE: CPT

## 2023-03-09 PROCEDURE — 72197 MRI PELVIS W/O & W/DYE: CPT

## 2023-03-09 PROCEDURE — 80048 BASIC METABOLIC PNL TOTAL CA: CPT

## 2023-03-09 PROCEDURE — 83690 ASSAY OF LIPASE: CPT

## 2023-03-09 PROCEDURE — 82947 ASSAY GLUCOSE BLOOD QUANT: CPT

## 2023-03-09 PROCEDURE — 86901 BLOOD TYPING SEROLOGIC RH(D): CPT

## 2023-03-09 PROCEDURE — 97161 PT EVAL LOW COMPLEX 20 MIN: CPT

## 2023-03-09 PROCEDURE — 93975 VASCULAR STUDY: CPT

## 2023-03-09 PROCEDURE — 84132 ASSAY OF SERUM POTASSIUM: CPT

## 2023-03-09 PROCEDURE — 87040 BLOOD CULTURE FOR BACTERIA: CPT

## 2023-03-09 PROCEDURE — 87491 CHLMYD TRACH DNA AMP PROBE: CPT

## 2023-03-09 PROCEDURE — 85018 HEMOGLOBIN: CPT

## 2023-03-09 PROCEDURE — 85610 PROTHROMBIN TIME: CPT

## 2023-03-09 PROCEDURE — 93005 ELECTROCARDIOGRAM TRACING: CPT

## 2023-03-09 PROCEDURE — 87186 SC STD MICRODIL/AGAR DIL: CPT

## 2023-03-09 PROCEDURE — 85014 HEMATOCRIT: CPT

## 2023-03-09 PROCEDURE — 87077 CULTURE AEROBIC IDENTIFY: CPT

## 2023-03-09 PROCEDURE — 85730 THROMBOPLASTIN TIME PARTIAL: CPT

## 2023-03-09 PROCEDURE — 96361 HYDRATE IV INFUSION ADD-ON: CPT

## 2023-03-09 PROCEDURE — 85384 FIBRINOGEN ACTIVITY: CPT

## 2023-03-09 PROCEDURE — 96365 THER/PROPH/DIAG IV INF INIT: CPT

## 2023-03-09 PROCEDURE — 84145 PROCALCITONIN (PCT): CPT

## 2023-03-09 PROCEDURE — 74183 MRI ABD W/O CNTR FLWD CNTR: CPT

## 2023-03-09 PROCEDURE — 86850 RBC ANTIBODY SCREEN: CPT

## 2023-03-09 PROCEDURE — 49406 IMAGE CATH FLUID PERI/RETRO: CPT

## 2023-03-09 PROCEDURE — 76830 TRANSVAGINAL US NON-OB: CPT

## 2023-03-09 RX ORDER — DIPHENHYDRAMINE HYDROCHLORIDE AND LIDOCAINE HYDROCHLORIDE AND ALUMINUM HYDROXIDE AND MAGNESIUM HYDRO
1 KIT
Qty: 200 | Refills: 0
Start: 2023-03-09

## 2023-03-09 RX ORDER — ONDANSETRON 8 MG/1
1 TABLET, FILM COATED ORAL
Qty: 12 | Refills: 0
Start: 2023-03-09

## 2023-03-09 RX ORDER — FAMOTIDINE 10 MG/ML
20 INJECTION INTRAVENOUS DAILY
Refills: 0 | Status: DISCONTINUED | OUTPATIENT
Start: 2023-03-09 | End: 2023-03-09

## 2023-03-09 RX ORDER — BACILLUS COAGULANS/INULIN 1B-250 MG
1 CAPSULE ORAL
Qty: 30 | Refills: 0
Start: 2023-03-09 | End: 2023-04-07

## 2023-03-09 RX ADMIN — Medication 650 MILLIGRAM(S): at 12:45

## 2023-03-09 RX ADMIN — Medication 650 MILLIGRAM(S): at 00:32

## 2023-03-09 RX ADMIN — SODIUM CHLORIDE 3 MILLILITER(S): 9 INJECTION INTRAMUSCULAR; INTRAVENOUS; SUBCUTANEOUS at 05:49

## 2023-03-09 RX ADMIN — HEPARIN SODIUM 5000 UNIT(S): 5000 INJECTION INTRAVENOUS; SUBCUTANEOUS at 05:29

## 2023-03-09 RX ADMIN — Medication 600 MILLIGRAM(S): at 03:34

## 2023-03-09 RX ADMIN — Medication 1 TABLET(S): at 10:08

## 2023-03-09 RX ADMIN — Medication 600 MILLIGRAM(S): at 04:04

## 2023-03-09 RX ADMIN — Medication 650 MILLIGRAM(S): at 12:15

## 2023-03-09 RX ADMIN — Medication 650 MILLIGRAM(S): at 05:59

## 2023-03-09 RX ADMIN — DIPHENHYDRAMINE HYDROCHLORIDE AND LIDOCAINE HYDROCHLORIDE AND ALUMINUM HYDROXIDE AND MAGNESIUM HYDRO 10 MILLILITER(S): KIT at 10:09

## 2023-03-09 RX ADMIN — Medication 650 MILLIGRAM(S): at 01:02

## 2023-03-09 RX ADMIN — Medication 600 MILLIGRAM(S): at 10:05

## 2023-03-09 RX ADMIN — FAMOTIDINE 20 MILLIGRAM(S): 10 INJECTION INTRAVENOUS at 12:16

## 2023-03-09 RX ADMIN — Medication 650 MILLIGRAM(S): at 05:29

## 2023-03-09 RX ADMIN — Medication 600 MILLIGRAM(S): at 09:35

## 2023-03-09 RX ADMIN — Medication 1 TABLET(S): at 12:15

## 2023-03-09 NOTE — PROGRESS NOTE ADULT - SUBJECTIVE AND OBJECTIVE BOX
GYN Progress Note HD#7    Pt seen and examined at bedside in NAD. Patient endorsing blood in stool. Endorses history of hemorrhoids as well as current menstruation. Pain well controlled. Pt able to ambulate, void spontaneously. Pt having bowel movements. No nausea or vomiting overnight. Tolerating regular diet.  Denies shortness of breath. Patient denies lightheadedness chest pain, fevers, chills, diarrhea.    Vital Signs Last 24 Hours  T(C): 36.3 (03-09-23 @ 06:14), Max: 36.9 (03-08-23 @ 21:00)  HR: 75 (03-09-23 @ 06:14) (75 - 91)  BP: 102/71 (03-09-23 @ 06:14) (94/61 - 105/71)  RR: 18 (03-09-23 @ 06:14) (18 - 18)  SpO2: 97% (03-09-23 @ 06:14) (96% - 98%)    I&O's Summary    07 Mar 2023 07:01  -  08 Mar 2023 07:00  --------------------------------------------------------  IN: 800 mL / OUT: 235 mL / NET: 565 mL    08 Mar 2023 07:01  -  09 Mar 2023 06:30  --------------------------------------------------------  IN: 0 mL / OUT: 2142 mL / NET: -2142 mL      Gen: alert, oriented  CVS: RRR  Lungs: CTAB  Abdomen: Soft, mildy tender, LLQ abdominal drain c/d/i.     Labs:                        7.8    11.86 )-----------( 390      ( 08 Mar 2023 06:34 )             24.7   baso 0.3    eos 3.0    imm gran 0.7    lymph 13.7   mono 8.2    poly 74.1                         9.3    15.15 )-----------( 452      ( 07 Mar 2023 06:35 )             29.9   baso 0.3    eos 1.8    imm gran 0.7    lymph 10.9   mono 7.4    poly 78.9                         7.1    15.66 )-----------( 281      ( 06 Mar 2023 06:43 )             23.0   baso 0.1    eos 0.1    imm gran 0.5    lymph 8.2    mono 2.5    poly 88.6       MEDICATIONS  (STANDING):  famotidine Injectable 20 milliGRAM(s) IV Push daily  heparin   Injectable 5000 Unit(s) SubCutaneous every 12 hours  lactobacillus acidophilus 1 Tablet(s) Oral daily  sodium chloride 0.9% lock flush 3 milliLiter(s) IV Push every 8 hours  trimethoprim  160 mG/sulfamethoxazole 800 mG 1 Tablet(s) Oral every 12 hours    MEDICATIONS  (PRN):  acetaminophen     Tablet .. 650 milliGRAM(s) Oral every 6 hours PRN Mild Pain (1 - 3)  aluminum hydroxide/magnesium hydroxide/simethicone Suspension 30 milliLiter(s) Oral every 4 hours PRN Dyspepsia  FIRST- Mouthwash  BLM 10 milliLiter(s) Swish and Spit every 6 hours PRN Mouth Care  ibuprofen  Tablet. 600 milliGRAM(s) Oral every 6 hours PRN Mild Pain (1 - 3), Moderate Pain (4 - 6)

## 2023-03-09 NOTE — PROGRESS NOTE ADULT - PROVIDER SPECIALTY LIST ADULT
GYN
GYN
Intervent Radiology
SICU
SICU
GYN
Intervent Radiology

## 2023-03-09 NOTE — DISCHARGE NOTE NURSING/CASE MANAGEMENT/SOCIAL WORK - NSDCVIVACCINE_GEN_ALL_CORE_FT
Ongoing SW/CM Assessment/Plan of Care Note     See SW/CM flowsheets for goals and other objective data. Progress note:  Clear liquid diet  Hemoglobin 9.4, on room air  Iv antibiotics, iv steriods  IR liver biopsy and colonoscopy pending (likely Friday)    Barriers to discharge:   Medical Clearance    Discharge plan:  Home with family. CM/SW team to continue to follow for discharge needs. No Vaccines Administered.

## 2023-03-09 NOTE — PROGRESS NOTE ADULT - ASSESSMENT
43yo G0 LMP 3/1 HD#7 for treatment of TOA. Patient p/w acute worsening of LLQ, tvus w/ enlarged left ovary with complex cystic lesion. Hypotensive and tachycardic upon admission, requiring SICU care. Now s/p IR drainage of 135cc purulent fluid (3/5). Clinically patient appears stable at this time. VSS at this time. Patient in no acute distress and afebrile.     Neuro: Pain well controlled. PO Tylenol. Not requiring additional pain meds.   CV: Hemodynamically stable,  AM CBC  Pulm: Saturating well on room air, encourage oob/amb  GI: Regular diet, Zofran PRN for nausea  : Voiding spontaneously   #TOA  - CTAP @ OSH 3/2: Left adnexal mass measuring 9.0 cm with areas of higher density and septations peripherally. Infiltration of the fat adjacent to the mass. Multiple lymph nodes adjacent to the mass including a representative lymph node measuring 8 mm.  - TVUS 3/3: Enlarged left ovary with 7.6 x 4.7 x 5.8 cm cystic structure and central soft tissue components; likely a hemorrhagic cyst. Ovarian cystic neoplasm cannot be excluded. Small volume free fluid in cul-de-sac.  - s/p IV antibiotics: cefotetan qD, doxy, flagy BID (3/3- ); last febrile at 3/3 @ 23:00 38.0 *C  - Tolerating Bactrim PO without difficulty. Plan to discharge on Bactrim.   - IR recs: drainage of 135cc purulent output. LLQ drain placed.   - MRI (3/3): 9cm left tubo ovarian abscess. No acute abdominal findings.   -Syphilis, Hep A, Hep B and Hep C serologies neg (3/3), GC/CT negative   - lactate wnl, coags elevated. will repeat PRN  - BCx NGTD  - Abscess culture growing e coli and strep anginosus sensitive to multiple antibiotics   Heme: Ambulation and SCDs for DVT ppx   - HSQ   FEN: SLIV  - s/p 3L LR in ED due to hypotension/tachycardia  ID: Afebrile  - s/p IV doxy (3/3-), IV flagyl (3/3-), IV cefotetan (3/3-).   - c/w PO Bactrim   Endo: No active issues   Dispo: Discharge planning     Shiloh Eaton, PGY2

## 2023-03-09 NOTE — DISCHARGE NOTE NURSING/CASE MANAGEMENT/SOCIAL WORK - PATIENT PORTAL LINK FT
You can access the FollowMyHealth Patient Portal offered by [facility] by registering at the following website: [email or web address]. By joining [employer]’s FollowMyHealth portal, you will also be able to view your health information using other applications (apps) compatible with our system.

## 2023-03-09 NOTE — PROGRESS NOTE ADULT - ATTENDING COMMENTS
41yo G0 HD3 admitted with ~9cm left tubo-ovarian abscess. Patient currently admitted to SICU for concern for sepsis upon initial presentation. Hypotension now improved. Remains Tachypneic and tachycardic but stable. Patient seen at the bedside resting, reports diarrhea slightly improved with probiotics, but still present. Gas pain temporarily relieved with diarrhea. Has remained afebrile (T-last 100.4 @ 2300 3/3) and pain overall well controlled. Abdominal exam with abdomen moderately distended with tenderness in LLQ. Plan for patient to obtain IR drainage of TOA this afternoon. Leukocytosis with slight improvement this morning. Will continue abx (Cefotetan/Flagyl/Doxyclyine). Continue SICU management. GYN will continue to follow.  MD Delano    ICU Vital Signs Last 24 Hrs  T(C): 37.2 (05 Mar 2023 07:00), Max: 37.4 (05 Mar 2023 03:00)  T(F): 99 (05 Mar 2023 07:00), Max: 99.3 (05 Mar 2023 03:00)  HR: 104 (05 Mar 2023 09:00) (91 - 112)  BP: 108/65 (05 Mar 2023 09:00) (88/56 - 108/72)  BP(mean): 80 (05 Mar 2023 09:00) (67 - 86)  RR: 27 (05 Mar 2023 09:00) (19 - 32)  SpO2: 94% (05 Mar 2023 09:00) (91% - 99%)  Patient On (Oxygen Delivery Method): room air                          7.9    15.75 )-----------( 296      ( 05 Mar 2023 00:48 )             25.7
No acute overnight events. Nausea improved. No emesis. Improvement of diarrhea. Ambulating more. Improvement of abd pain. No drainage from IR drain overnight. SLIV. C/w gyn DVT prophylaxis. Awaiting IR drainage Cx. C/w IV abx while in house.   C/w in pt care.     RShibata
Pt doing well. Ambulating in hallway. States she feels ready to go home. CBC reviewed. wbc 12.3. afebrile. VSS. Benign abd exam. deniend n/v. tolerating reg diet. Pt to have VNS for IR drain. Pt to have IR drain removed when no output for 24 hours. Pt has f/u information for drain removal. Pt to establish care for outpt fu. recommend outpt cbc next week to assess wbc in case pt cant get f/u appt. Pt to continue with bactrim. discharge precautions reviewed with pt and      Mirtha
Pt seen and examined with SICU team, agree with above. Findings and plan d/w patient and her family at bedside.    1. Sepsis secondary to left tubo-ovarian abscess:  - On antibiotics  - IR for drain placement today  - NPO for procedure    - Coagulopathy likely due to sepsis, is resolving    2. Incidental pulmonary nodules:  - Will need to discuss with patient and ensure follow up
Pt seen and examined with SICU team on 3/5, agree with above.    1. Sepsis secondary to left tubo-ovarian abscess:  - On antibiotics (cefotetan, doxycycline, flagyl)  - IR for drain placement today  - NPO for procedure    - Coagulopathy likely due to sepsis, is resolving    2. Incidental pulmonary nodules:  - Discussed with patient, copy of report given. I have discussed with Dr. Stephens (on 3/6), who will ensure information is updated on discharge summary.     3. Hypophosphatemia:  - Replete and recheck
Service Attending  Pt seen and examined.  Agree with note above  Pt is clinically stable.  abd is tender, rebound, with guarding.  A/P:  Pt with TOA on cefoxy, doxy and flagyl with downtrending WBC.  Pt is currently planned for IR drainage tomorrow.  Will continue to monitor.  Appreciate SICU care.  Jayda Mccarty MD
43yo G0 HD#6 admitted for treatment of left TOA. Patient originally presented hypotensive and tachycardic requiring SICU care, underwent IR drainage of TOA yielding 135cc purulent fluid (3/5). She was discharged from the SICU and is now very well appearing, clinically stable, no acute events, and has been afebrile since Tlast on 3/3. Patient seen this morning, sitting up at a desk and chair eating breakfast.  States pain is only located at drain site. Ambulating without difficulty. Reports occasional diarrhea, also improved since the weekend and while on probiotics. VSS. Abdominal exam noting mild distension, non tender, no R/G. LLQ drain in place with scant blood in bag. Discussed with patient that cultures finalized from drain yielding E.Coli sensitive to multiple abx; including Bactrim. Plan to transition to Bactrim this morning. Discontinued cefotetan/Doxy/Flagyl. Leukocytosis improved this morning as well. Drain with minimal output. Plan to observe patient on PO abx today with likely removal of drain tomorrow morning and discharge tomorrow afternoon, pending stability.   MD Delano    ICU Vital Signs Last 24 Hrs  T(C): 36.6 (08 Mar 2023 09:05), Max: 37.2 (07 Mar 2023 21:00)  T(F): 97.9 (08 Mar 2023 09:05), Max: 99 (07 Mar 2023 21:00)  HR: 88 (08 Mar 2023 09:53) (84 - 95)  BP: 104/71 (08 Mar 2023 09:53) (100/66 - 107/72)  RR: 18 (08 Mar 2023 09:05) (18 - 18)  SpO2: 96% (08 Mar 2023 09:53) (95% - 97%)  Patient On (Oxygen Delivery Method): room air                          7.8    11.86 )-----------( 390      ( 08 Mar 2023 06:34 )             24.7
Pt evaluated on AM rounds. No acute overnight events. Afebrile. Minimal abd/pelvic pain. Some nausea and one episode of emesis. Pt had diarrhea prior to admission. Improvement with no BM overnight. Will consider sending c.diff. IR drain in place. Will f/u Cx. C/w IV abx. Continue inpt management.     RShibata

## 2023-03-09 NOTE — DISCHARGE NOTE NURSING/CASE MANAGEMENT/SOCIAL WORK - NSDCFUADDAPPT_GEN_ALL_CORE_FT
-needs immediate follow up with PMD to discuss further work up of incidental lung nodules findings, report given to patient  -Make an appointment with interventional radiology by calling the IR booking office at [phone]

## 2023-03-09 NOTE — PROVIDER CONTACT NOTE (OTHER) - RECOMMENDATIONS
Dr Whitehead aware. Pt educated that if she has another episode, to save sample in clean hat for assessment.
notify provider

## 2023-03-09 NOTE — DISCHARGE NOTE NURSING/CASE MANAGEMENT/SOCIAL WORK - NSDCPEFALRISK_GEN_ALL_CORE
For information on Fall & Injury Prevention, visit: [email or web address] OR  [email or web address] OR  https://www.cdc.gov/steadi/patient.html

## 2023-03-09 NOTE — PROVIDER CONTACT NOTE (OTHER) - ASSESSMENT
VSS. NAD. Pt denies increased abdominal pain. Pt reports that she had "john blood" in her stool followed by diarrhea. Pt also separately reports bloody appearing urine. Pt dumped sample prior to calling RN to assess.
no distress noted. pt just sat down from going to bathroom

## 2023-03-10 LAB
CULTURE RESULTS: SIGNIFICANT CHANGE UP
ORGANISM # SPEC MICROSCOPIC CNT: SIGNIFICANT CHANGE UP
ORGANISM # SPEC MICROSCOPIC CNT: SIGNIFICANT CHANGE UP
SPECIMEN SOURCE: SIGNIFICANT CHANGE UP

## 2023-03-14 ENCOUNTER — NON-APPOINTMENT (OUTPATIENT)
Age: 43
End: 2023-03-14

## 2023-03-15 ENCOUNTER — APPOINTMENT (OUTPATIENT)
Dept: OBGYN | Facility: CLINIC | Age: 43
End: 2023-03-15
Payer: COMMERCIAL

## 2023-03-15 VITALS
DIASTOLIC BLOOD PRESSURE: 70 MMHG | BODY MASS INDEX: 17.72 KG/M2 | WEIGHT: 100 LBS | HEIGHT: 63 IN | SYSTOLIC BLOOD PRESSURE: 104 MMHG

## 2023-03-15 PROCEDURE — 99204 OFFICE O/P NEW MOD 45 MIN: CPT

## 2023-03-15 NOTE — PHYSICAL EXAM
[Appropriately responsive] : appropriately responsive [Alert] : alert [No Acute Distress] : no acute distress [Soft] : soft [Non-tender] : non-tender [Non-distended] : non-distended [Oriented x3] : oriented x3 [FreeTextEntry7] : drain has minimal output, area around the drain is healing well

## 2023-03-15 NOTE — HISTORY OF PRESENT ILLNESS
[Normal Amount/Duration] :  normal amount and duration [Regular Cycle Intervals] : periods have been regular [Currently Active] : currently active [Men] : men [Vaginal] : vaginal [No] : No [Patient refuses STI testing] : Patient refuses STI testing [FreeTextEntry1] : 3/1/23

## 2023-03-15 NOTE — PLAN
[FreeTextEntry1] : 43 y/o G0 LMP 3/1 presents as ED follow up due to TOA \par \par continue w bactrim\par follow up with IR for drain removal \par CBC was still elevated at 12 wbc, but stable from discharge; will repeat next week\par will have patient return next week for pelvic exam and re-evaluation

## 2023-03-16 ENCOUNTER — RESULT REVIEW (OUTPATIENT)
Age: 43
End: 2023-03-16

## 2023-03-16 ENCOUNTER — OUTPATIENT (OUTPATIENT)
Dept: OUTPATIENT SERVICES | Facility: HOSPITAL | Age: 43
LOS: 1 days | End: 2023-03-16
Payer: COMMERCIAL

## 2023-03-16 ENCOUNTER — TRANSCRIPTION ENCOUNTER (OUTPATIENT)
Age: 43
End: 2023-03-16

## 2023-03-16 ENCOUNTER — APPOINTMENT (OUTPATIENT)
Dept: CT IMAGING | Facility: HOSPITAL | Age: 43
End: 2023-03-16

## 2023-03-16 VITALS — WEIGHT: 102.96 LBS | HEIGHT: 63 IN

## 2023-03-16 DIAGNOSIS — Z46.82 ENCOUNTER FOR FITTING AND ADJUSTMENT OF NON-VASCULAR CATHETER: ICD-10-CM

## 2023-03-16 DIAGNOSIS — R18.8 OTHER ASCITES: ICD-10-CM

## 2023-03-16 PROCEDURE — C1769: CPT

## 2023-03-16 PROCEDURE — 74176 CT ABD & PELVIS W/O CONTRAST: CPT | Mod: 26

## 2023-03-16 PROCEDURE — 76080 X-RAY EXAM OF FISTULA: CPT

## 2023-03-16 PROCEDURE — 76080 X-RAY EXAM OF FISTULA: CPT | Mod: 26

## 2023-03-16 PROCEDURE — 49424 ASSESS CYST CONTRAST INJECT: CPT

## 2023-03-16 PROCEDURE — 74176 CT ABD & PELVIS W/O CONTRAST: CPT

## 2023-03-16 NOTE — ASU PATIENT PROFILE, ADULT - FALL HARM RISK - UNIVERSAL INTERVENTIONS
Bed in lowest position, wheels locked, appropriate side rails in place/Call bell, personal items and telephone in reach/Instruct patient to call for assistance before getting out of bed or chair/Non-slip footwear when patient is out of bed/[place] to call system/Physically safe environment - no spills, clutter or unnecessary equipment/Purposeful Proactive Rounding/Room/bathroom lighting operational, light cord in reach

## 2023-03-16 NOTE — ASU DISCHARGE PLAN (ADULT/PEDIATRIC) - NURSING INSTRUCTIONS
Please feel free to contact us at [phone] if any problems arise. After 6PM, Monday through Friday, on weekends and on holidays, please call [phone] and ask for the radiology resident on call to be paged.

## 2023-03-16 NOTE — PRE PROCEDURE NOTE - PRE PROCEDURE EVALUATION
Interventional Radiology    HPI: 42y Female with L tubo-ovarian abscess and leukocytosis, now s/p left tubo-ovarian abscess drain placement on 3/5 in Interventional Radiology. Now presents to IR for abscess drain evaluation. Patient reports 24hr output of <5cc x several days; denies fever, chills, abdominal pain. Currently on Bactrim, one day left of course.    Allergies: oxycodone (Pruritus)    Medications (Abx/Cardiac/Anticoagulation/Blood Products)      Data:  160  46.675  T(C): --  HR: --  BP: --  RR: --  SpO2: --    Exam  General: No acute distress  Chest: Non labored breathing      Imaging: Reviewed with Dr. Swenson.    Plan: 42y Female presents for left tubo-ovarian abscess drain evaluation, and possible removal, revision, or replacement.   -Risks/Benefits/alternatives explained with the patient and/or healthcare proxy and witnessed informed consent obtained.

## 2023-03-16 NOTE — ASU DISCHARGE PLAN (ADULT/PEDIATRIC) - NS MD DC FALL RISK RISK
For information on Fall & Injury Prevention, visit: [email or web address] OR  [email or web address] OR  https://www.cdc.gov/steadi/patient.html

## 2023-03-16 NOTE — ASU PATIENT PROFILE, ADULT - NS PRO AD INFO GIVEN Y
Short Stay Endoscopy History and Physical    PCP - Cornelio Ham MD    Procedure - Colonoscopy  ASA - 2  Mallampati - per anesthesia  History of Anesthesia problems - no  Family history Anesthesia problems -  no     HPI:  This is a 65 y.o. male here for evaluation of :   Active Hospital Problems    Diagnosis  POA    *History of colon polyps [Z86.010]  Not Applicable     Multiple colon polyps were removed in 2014 at the time of colonoscopy.        Resolved Hospital Problems    Diagnosis Date Resolved POA   No resolved problems to display.         Health Maintenance       Date Due Completion Date    Zoster Vaccine 11/20/2013 ---    Pneumococcal PCV13 (High Risk) (1 - PCV13 Required) 07/08/2014 ---    Colonoscopy 11/12/2017 11/12/2014    Eye Exam 03/27/2018 3/27/2017    Urine Microalbumin 06/05/2018 6/5/2017    Override on 10/23/2014: Not Clinically Appropriate    Hemoglobin A1c 09/06/2018 3/6/2018    Pneumococcal PPSV23 (High Risk) (3) 11/20/2018 7/8/2013    Lipid Panel 03/06/2019 3/6/2018    Foot Exam 03/09/2019 3/9/2018    Override on 2/16/2015: Done    Override on 12/8/2014: Done    Override on 10/26/2014: Done    Override on 9/11/2013: Done    High Dose Statin 04/04/2019 4/4/2018    TETANUS VACCINE 12/04/2019 12/4/2009          Screening - yes  History of polyps - yes  Diarrhea - no  Anemia - no  Blood in stools - no  Abdominal pain - no  Other - no    ROS:  CONSTITUTIONAL: Denies weight change,  fatigue, fevers, chills, night sweats.  CARDIOVASCULAR: Denies chest pain, shortness of breath, orthopnea and edema.  RESPIRATORY: Denies cough, hemoptysis, dyspnea, and wheezing.  GI: See HPI.    Medical History:   Past Medical History:   Diagnosis Date    Acquired renal cyst of left kidney     Anemia associated with chronic renal failure     CAD (coronary artery disease)     nonobstructive lhc 9/14    CHF (congestive heart failure)     Chronic immunosuppression with Prograf and MMF 6/18/2015    CKD (chronic  kidney disease) stage 3, GFR 30-59 ml/min     Diabetic retinopathy     DM (diabetes mellitus), type 2 with complications 1994    Edema     End stage kidney disease     s/p transplant, doing well    Gallbladder polyp     Heart failure, diastolic, due to HTN     Hemodialysis status     off since transplant    Hepatitis C antibody positive in blood     Virus undetectable in blood.     History of colon polyps     HPTH (hyperparathyroidism)     Hyperlipidemia     Hypertension associated with stage 3 chronic kidney disease due to type 2 diabetes mellitus     Obesity     Proteinuria     resolved s/p transplant    S/P kidney transplant     Type 2 diabetes, uncontrolled, with retinopathy     Type II diabetes mellitus with renal manifestations        Surgical History:   Past Surgical History:   Procedure Laterality Date    CARDIAC CATHETERIZATION  2008    normal coronary    KIDNEY TRANSPLANT      RETINAL LASER PROCEDURE         Family History:   Family History   Problem Relation Age of Onset    Diabetes Mother     Hypertension Mother     Heart failure Mother     Kidney disease Sister      ESRD    Diabetes Sister     Diabetes Maternal Grandmother     Cancer Neg Hx        Social History:   Social History   Substance Use Topics    Smoking status: Never Smoker    Smokeless tobacco: Former User     Quit date: 6/11/2013      Comment: used marijuana since [id number], stopped after started dialysis    Alcohol use No       Allergies:   Review of patient's allergies indicates:   Allergen Reactions    Lisinopril Other (See Comments)     Other reaction(s):  cough    Actos  [pioglitazone]      Other reaction(s): chf    Metformin      Other reaction(s): renal insuff  Other reaction(s): chf       Medications:   No current facility-administered medications on file prior to encounter.      Current Outpatient Prescriptions on File Prior to Encounter   Medication Sig Dispense Refill    amLODIPine (NORVASC) 2.5  "MG tablet Take 1 tablet (2.5 mg total) by mouth once daily. 30 tablet 11    aspirin (ECOTRIN) 81 MG EC tablet Take 1 tablet by mouth Daily.       BD INSULIN PEN NEEDLE UF MINI 31 gauge x 3/16" Ndle Use prn 100 each 1    bisacodyl (DULCOLAX) 5 mg EC tablet Take 5 mg by mouth daily as needed for Constipation.      blood sugar diagnostic Strp To test four times per day 150 strip 11    furosemide (LASIX) 40 MG tablet Take 1 tablet (40 mg total) by mouth daily as needed. 15 tablet 11    glimepiride (AMARYL) 2 MG tablet TAKE 1 TABLET BY MOUTH DAILY BEFORE BREAKFAST 30 tablet 11    insulin aspart (NOVOLOG FLEXPEN) 100 unit/mL InPn pen 20 plus: <130=0, 130-150=4, 150-200=10,  200-250=15, 251-300=20, 300-350=25, 351-4000=30, >039=562 with each meal Min15- 20 units TID 6 Box 3    ketoconazole (NIZORAL) 200 mg Tab TAKE HALF TABLET (100 MG TOTAL) BY MOUTH ONCE DAILY 15 tablet 10    lancets 33 gauge Misc 1 Stick by Misc.(Non-Drug; Combo Route) route as directed. Contour lancets. Use to check BG 2-3 times daily. 150 each 11    LANTUS SOLOSTAR U-100 INSULIN 100 unit/mL (3 mL) InPn pen INJECT 30 UNITS INTO THE SKIN TWO TIMES A DAY 30 mL 0    magnesium oxide (MAG-OX) 400 mg tablet TAKE 2 TABLETS BY MOUTH TWO TIMES A  tablet 11    metFORMIN (GLUCOPHAGE) 500 MG tablet Take 1 tablet (500 mg total) by mouth 2 (two) times daily with meals. 180 tablet 3    metoprolol succinate (TOPROL-XL) 25 MG 24 hr tablet TAKE TWO TABLETS BY MOUTH ONCE DAILY 60 tablet 11    multivitamin (THERAGRAN) tablet Take 1 tablet by mouth once daily.      mycophenolate (CELLCEPT) 250 mg Cap TAKE THREE CAPSULES BY MOUTH TWICE A  capsule 11    omega-3 fatty acids-vitamin E (FISH OIL) 1,000 mg Cap Take 1 capsule by mouth once daily.       pen needle, diabetic (BD INSULIN PEN NEEDLE UF SHORT) 31 gauge x 5/16" Ndle USE TO INJECT INSULIN TWICE A  each 5    predniSONE (DELTASONE) 5 MG tablet TAKE ONE TABLET BY MOUTH EVERY DAY 30 " tablet 11    PROGRAF 1 mg Cap TAKE 5 CAPSULES BY MOUTH EVERY MORNING AND 4 CAPSULES EVERY EVENING 270 capsule 9    VITAMIN D2 50,000 unit capsule TAKE ONE CAPSULE BY MOUTH EVERY 7 DAYS 13 capsule 3    [DISCONTINUED] HUMALOG KWIKPEN 100 unit/mL InPn pen INJECT 15 UNITS SUBCUTANEOUSLY 10 TO 15 MINUTES BEFORE MEALS 15 Syringe 5       Physical Exam:  Vital Signs:   Vitals:    04/05/18 0755   BP: (!) 138/92   Pulse: 106   Resp: 18   Temp: 97.8 °F (36.6 °C)     General Appearance: Well appearing in no acute distress  ENT: OP clear  Chest: CTA B  CV: RRR, no m/r/g  Abd: s/nt/nd/nabs  Ext: no edema    Labs:Reviewed    IMP:  Active Hospital Problems    Diagnosis  POA    *History of colon polyps [Z86.010]  Not Applicable     Multiple colon polyps were removed in 2014 at the time of colonoscopy.        Resolved Hospital Problems    Diagnosis Date Resolved POA   No resolved problems to display.         Plan:   I have explained the risks and benefits of colonoscopy to the patient including but not limited to bleeding, perforation, infection, and death. The patient wishes to proceed.     yes

## 2023-03-16 NOTE — PROCEDURE NOTE - PROCEDURE FINDINGS AND DETAILS
Contrast injection demonstrated resolution of abscess, with no evidence of fistula or communication to adjacent structures. Cathter was removed intact over wire, with fluoroscopy guidance. No immediate complications. Dry and sterile dressing applied. Full report to follow.

## 2023-03-17 ENCOUNTER — EMERGENCY (EMERGENCY)
Facility: HOSPITAL | Age: 43
LOS: 1 days | Discharge: HOME | End: 2023-03-17
Attending: EMERGENCY MEDICINE
Payer: COMMERCIAL

## 2023-03-17 VITALS
TEMPERATURE: 98 F | DIASTOLIC BLOOD PRESSURE: 83 MMHG | OXYGEN SATURATION: 98 % | HEART RATE: 93 BPM | WEIGHT: 98.11 LBS | SYSTOLIC BLOOD PRESSURE: 113 MMHG | HEIGHT: 63 IN | RESPIRATION RATE: 20 BRPM

## 2023-03-17 VITALS
DIASTOLIC BLOOD PRESSURE: 70 MMHG | OXYGEN SATURATION: 98 % | SYSTOLIC BLOOD PRESSURE: 106 MMHG | HEART RATE: 96 BPM | RESPIRATION RATE: 18 BRPM

## 2023-03-17 LAB
ALBUMIN SERPL ELPH-MCNC: 4.3 G/DL — SIGNIFICANT CHANGE UP (ref 3.3–5)
ALP SERPL-CCNC: 132 U/L — HIGH (ref 40–120)
ALT FLD-CCNC: 72 U/L — HIGH (ref 10–45)
ANION GAP SERPL CALC-SCNC: 13 MMOL/L — SIGNIFICANT CHANGE UP (ref 5–17)
ANISOCYTOSIS BLD QL: SLIGHT — SIGNIFICANT CHANGE UP
APPEARANCE UR: CLEAR — SIGNIFICANT CHANGE UP
APTT BLD: 52.7 SEC — HIGH (ref 27.5–35.5)
AST SERPL-CCNC: 45 U/L — HIGH (ref 10–40)
BACTERIA # UR AUTO: NEGATIVE — SIGNIFICANT CHANGE UP
BASOPHILS # BLD AUTO: 0 K/UL — SIGNIFICANT CHANGE UP (ref 0–0.2)
BASOPHILS NFR BLD AUTO: 0 % — SIGNIFICANT CHANGE UP (ref 0–2)
BILIRUB SERPL-MCNC: 0.2 MG/DL — SIGNIFICANT CHANGE UP (ref 0.2–1.2)
BILIRUB UR-MCNC: NEGATIVE — SIGNIFICANT CHANGE UP
BUN SERPL-MCNC: 14 MG/DL — SIGNIFICANT CHANGE UP (ref 7–23)
CALCIUM SERPL-MCNC: 9.9 MG/DL — SIGNIFICANT CHANGE UP (ref 8.4–10.5)
CHLORIDE SERPL-SCNC: 97 MMOL/L — SIGNIFICANT CHANGE UP (ref 96–108)
CO2 SERPL-SCNC: 25 MMOL/L — SIGNIFICANT CHANGE UP (ref 22–31)
COLOR SPEC: SIGNIFICANT CHANGE UP
CREAT SERPL-MCNC: 0.54 MG/DL — SIGNIFICANT CHANGE UP (ref 0.5–1.3)
D DIMER BLD IA.RAPID-MCNC: 2020 NG/ML DDU — HIGH
DACRYOCYTES BLD QL SMEAR: SLIGHT — SIGNIFICANT CHANGE UP
DIFF PNL FLD: NEGATIVE — SIGNIFICANT CHANGE UP
EGFR: 119 ML/MIN/1.73M2 — SIGNIFICANT CHANGE UP
ELLIPTOCYTES BLD QL SMEAR: SLIGHT — SIGNIFICANT CHANGE UP
EOSINOPHIL # BLD AUTO: 0.08 K/UL — SIGNIFICANT CHANGE UP (ref 0–0.5)
EOSINOPHIL NFR BLD AUTO: 0.8 % — SIGNIFICANT CHANGE UP (ref 0–6)
EPI CELLS # UR: 4 /HPF — SIGNIFICANT CHANGE UP
GLUCOSE SERPL-MCNC: 79 MG/DL — SIGNIFICANT CHANGE UP (ref 70–99)
GLUCOSE UR QL: NEGATIVE — SIGNIFICANT CHANGE UP
HCG SERPL-ACNC: <2 MIU/ML — SIGNIFICANT CHANGE UP
HCT VFR BLD CALC: 29.1 % — LOW (ref 34.5–45)
HGB BLD-MCNC: 9.1 G/DL — LOW (ref 11.5–15.5)
HYALINE CASTS # UR AUTO: 5 /LPF — HIGH (ref 0–2)
INR BLD: 1.22 RATIO — HIGH (ref 0.88–1.16)
KETONES UR-MCNC: NEGATIVE — SIGNIFICANT CHANGE UP
LEUKOCYTE ESTERASE UR-ACNC: NEGATIVE — SIGNIFICANT CHANGE UP
LIDOCAIN IGE QN: 73 U/L — HIGH (ref 7–60)
LYMPHOCYTES # BLD AUTO: 1.84 K/UL — SIGNIFICANT CHANGE UP (ref 1–3.3)
LYMPHOCYTES # BLD AUTO: 19 % — SIGNIFICANT CHANGE UP (ref 13–44)
MANUAL SMEAR VERIFICATION: SIGNIFICANT CHANGE UP
MCHC RBC-ENTMCNC: 22.5 PG — LOW (ref 27–34)
MCHC RBC-ENTMCNC: 31.3 GM/DL — LOW (ref 32–36)
MCV RBC AUTO: 71.9 FL — LOW (ref 80–100)
MICROCYTES BLD QL: SLIGHT — SIGNIFICANT CHANGE UP
MONOCYTES # BLD AUTO: 0.5 K/UL — SIGNIFICANT CHANGE UP (ref 0–0.9)
MONOCYTES NFR BLD AUTO: 5.2 % — SIGNIFICANT CHANGE UP (ref 2–14)
NEUTROPHILS # BLD AUTO: 7.27 K/UL — SIGNIFICANT CHANGE UP (ref 1.8–7.4)
NEUTROPHILS NFR BLD AUTO: 75 % — SIGNIFICANT CHANGE UP (ref 43–77)
NITRITE UR-MCNC: NEGATIVE — SIGNIFICANT CHANGE UP
OVALOCYTES BLD QL SMEAR: SLIGHT — SIGNIFICANT CHANGE UP
PH UR: 6.5 — SIGNIFICANT CHANGE UP (ref 5–8)
PLAT MORPH BLD: NORMAL — SIGNIFICANT CHANGE UP
PLATELET # BLD AUTO: 939 K/UL — HIGH (ref 150–400)
POIKILOCYTOSIS BLD QL AUTO: SIGNIFICANT CHANGE UP
POTASSIUM SERPL-MCNC: 4.4 MMOL/L — SIGNIFICANT CHANGE UP (ref 3.5–5.3)
POTASSIUM SERPL-SCNC: 4.4 MMOL/L — SIGNIFICANT CHANGE UP (ref 3.5–5.3)
PROT SERPL-MCNC: 7.7 G/DL — SIGNIFICANT CHANGE UP (ref 6–8.3)
PROT UR-MCNC: ABNORMAL
PROTHROM AB SERPL-ACNC: 14.2 SEC — HIGH (ref 10.5–13.4)
RBC # BLD: 4.05 M/UL — SIGNIFICANT CHANGE UP (ref 3.8–5.2)
RBC # FLD: 18 % — HIGH (ref 10.3–14.5)
RBC BLD AUTO: ABNORMAL
RBC CASTS # UR COMP ASSIST: 3 /HPF — SIGNIFICANT CHANGE UP (ref 0–4)
SARS-COV-2 RNA SPEC QL NAA+PROBE: SIGNIFICANT CHANGE UP
SCHISTOCYTES BLD QL AUTO: SLIGHT — SIGNIFICANT CHANGE UP
SODIUM SERPL-SCNC: 135 MMOL/L — SIGNIFICANT CHANGE UP (ref 135–145)
SP GR SPEC: >1.05 (ref 1.01–1.02)
UROBILINOGEN FLD QL: NEGATIVE — SIGNIFICANT CHANGE UP
WBC # BLD: 9.69 K/UL — SIGNIFICANT CHANGE UP (ref 3.8–10.5)
WBC # FLD AUTO: 9.69 K/UL — SIGNIFICANT CHANGE UP (ref 3.8–10.5)
WBC UR QL: 3 /HPF — SIGNIFICANT CHANGE UP (ref 0–5)

## 2023-03-17 PROCEDURE — 71275 CT ANGIOGRAPHY CHEST: CPT | Mod: MA

## 2023-03-17 PROCEDURE — 85025 COMPLETE CBC W/AUTO DIFF WBC: CPT

## 2023-03-17 PROCEDURE — 71045 X-RAY EXAM CHEST 1 VIEW: CPT | Mod: 26

## 2023-03-17 PROCEDURE — 85730 THROMBOPLASTIN TIME PARTIAL: CPT

## 2023-03-17 PROCEDURE — 71275 CT ANGIOGRAPHY CHEST: CPT | Mod: 26,MA

## 2023-03-17 PROCEDURE — 80053 COMPREHEN METABOLIC PANEL: CPT

## 2023-03-17 PROCEDURE — U0005: CPT

## 2023-03-17 PROCEDURE — 85379 FIBRIN DEGRADATION QUANT: CPT

## 2023-03-17 PROCEDURE — 87086 URINE CULTURE/COLONY COUNT: CPT

## 2023-03-17 PROCEDURE — 74177 CT ABD & PELVIS W/CONTRAST: CPT | Mod: MA

## 2023-03-17 PROCEDURE — 71045 X-RAY EXAM CHEST 1 VIEW: CPT

## 2023-03-17 PROCEDURE — 74177 CT ABD & PELVIS W/CONTRAST: CPT | Mod: 26,MA

## 2023-03-17 PROCEDURE — 99285 EMERGENCY DEPT VISIT HI MDM: CPT

## 2023-03-17 PROCEDURE — 83690 ASSAY OF LIPASE: CPT

## 2023-03-17 PROCEDURE — 99285 EMERGENCY DEPT VISIT HI MDM: CPT | Mod: 25

## 2023-03-17 PROCEDURE — 93005 ELECTROCARDIOGRAM TRACING: CPT

## 2023-03-17 PROCEDURE — 81001 URINALYSIS AUTO W/SCOPE: CPT

## 2023-03-17 PROCEDURE — 85610 PROTHROMBIN TIME: CPT

## 2023-03-17 PROCEDURE — 84702 CHORIONIC GONADOTROPIN TEST: CPT

## 2023-03-17 PROCEDURE — 36415 COLL VENOUS BLD VENIPUNCTURE: CPT

## 2023-03-17 PROCEDURE — U0003: CPT

## 2023-03-17 RX ORDER — IBUPROFEN 200 MG
600 TABLET ORAL ONCE
Refills: 0 | Status: COMPLETED | OUTPATIENT
Start: 2023-03-17 | End: 2023-03-17

## 2023-03-17 RX ORDER — ACETAMINOPHEN 500 MG
975 TABLET ORAL ONCE
Refills: 0 | Status: COMPLETED | OUTPATIENT
Start: 2023-03-17 | End: 2023-03-17

## 2023-03-17 RX ADMIN — Medication 600 MILLIGRAM(S): at 10:57

## 2023-03-17 RX ADMIN — Medication 975 MILLIGRAM(S): at 10:57

## 2023-03-17 NOTE — ED PROVIDER NOTE - PATIENT PORTAL LINK FT
You can access the FollowMyHealth Patient Portal offered by [facility] by registering at the following website: [email or web address]. By joining [employer]’s FollowMyHealth portal, you will also be able to view your health information using other applications (apps) compatible with our system.

## 2023-03-17 NOTE — ED PROVIDER NOTE - OBJECTIVE STATEMENT
42-year-old female LMP 3/1 PMHx recent left TOA admitted 3/3-3/9 with IR drainage on 3/5, drain removal yesterday 3/16, presents to ED complaining of right upper quadrant and right lower chest wall pain since last night.  Pain is sharp radiating throughout region, moderate severity.  Pain is worse with movement and with deep breathing or sneezing.  Patient completed course of Bactrim this morning.  No fevers, chills, cough, shortness of breath, chest pain, nausea, vomiting, diarrhea, urinary complaints.

## 2023-03-17 NOTE — ED PROVIDER NOTE - ATTENDING APP SHARED VISIT CONTRIBUTION OF CARE
I performed a history and physical exam of the patient and discussed their management with the ACP. I reviewed the ACP's note and agree with the documented findings and plan of care.  Deepa Bentley MD

## 2023-03-17 NOTE — ED ADULT NURSE NOTE - OBJECTIVE STATEMENT
Female 42 years old alert and orientedx4 s/p removal of drain yesterday from a gangrenous ovarian cyst came in for right upper quadrant pain non radiating,  onset last night sharp 5/10. Pt reports pain is worse when taking a deep breathe or in movement. abdomen soft and non tender. Denies fever, chills, N/V or urinary symptoms. Will monitor.

## 2023-03-17 NOTE — ED PROVIDER NOTE - PROGRESS NOTE DETAILS
Wade Whaley PA-C: CT shows 7cm complex left adnexal cystic mass/collection. OBGYN consulted will see pt in ED. Wade Whaley PA-C:Patient seen by OB/GYN and cleared for discharge.  OB/GYN states findings expected given cystic ovary.  VSS.  Patient reports feeling better after medications.  On reexamination right upper quadrant TTP improved and patient remains without lower abdominal TTP.  All results discharge instructions and return precautions given to patient.  These included the incidental findings in her chest abdomen and pelvis that were concerning for neoplasm and patient will follow-up with her primary care provider.  Patient has follow-up with OB/GYN next Tuesday.  All questions answered.

## 2023-03-17 NOTE — ED PROVIDER NOTE - PHYSICAL EXAMINATION
GEN: Pt non-toxic in NAD, alert.  PSYCH: Affect and mood appropriate.  EYES: Sclera white w/o injection.  ENT: Neck supple. Airway patent.  RESP: CTA b/l, no wheezes, rales, or rhonchi.   CARDIAC: RRR, clear distinct S1, S2.  ABD: Small LLQ dressing from removed IR drain cdi. Abd soft, +RUQ ttp, no rebound or guarding. No CVAT b/l.  MSK: M[place]ON[place]. FROM throughout.  NEURO: No focal motor or sensory deficits.  VASC: Strong distal pulses. No edema. No calf tenderness.  SKIN: No rashes or lesions.

## 2023-03-17 NOTE — CONSULT NOTE ADULT - ASSESSMENT
41 yo G0 LMP 3/7 presenting with RUQ pain. Patient was previously admitted to GYN 3/3-3/9 for TOA requiring SICU admission due to significant tachycardia and IR placement of a abdominal drain. Culture grew e coli sensitive to bactrim. Patient was discharged on a course of Bactrim, which she finished today. The patient had her drain pulled by IR yesterday. CTAP showing left TOA now measuring 7x4cm (previously 9x7cm). Collection that was previously present now resolved s/p IR drain. CT findings are as expected 2 weeks after admission for treatment of TOA. No PE. No signs of systemic infection. VS wnl, no leukocytosis. RUQ pain likely musculoskeletal in nature.     - No GYN contraindication to discharge  - Patient to follow up with Dr. [place] on Tuesday 3/21. Appt is already scheduled.   - No indication for further antibiotic treatment at this time   - Return precautions reviewed    Shiloh Eaton, PGY2  d.w Dr. Nuñez

## 2023-03-17 NOTE — ED ADULT NURSE NOTE - NSIMPLEMENTINTERV_GEN_ALL_ED
Implemented All Universal Safety Interventions:  [place] to call system. Call bell, personal items and telephone within reach. Instruct patient to call for assistance. Room bathroom lighting operational. Non-slip footwear when patient is off stretcher. Physically safe environment: no spills, clutter or unnecessary equipment. Stretcher in lowest position, wheels locked, appropriate side rails in place.

## 2023-03-17 NOTE — ED PROVIDER NOTE - NSFOLLOWUPINSTRUCTIONS_ED_ALL_ED_FT
1) Follow-up with your primary care provider in 1-2 days. Follow-up with OB/GYN as scheduled next Tuesday 3/21.    ***There were findings suspicious for neoplasm/cancer found on the CT of your chest, abdomen, and pelvis.  These findings are detailed in your results which are attached.  Please follow-up regarding these findings.  These findings and summary are pulmonary nodules on both lungs, enlarged nodes in your abdomen, and thickening of your sigmoid colon all of which can be seen with neoplasm.  It is very important that you follow-up on these findings appropriately.***    2) Continue to take all medications as prescribed.    3) Pain can be managed with Acetaminophen (aka Tylenol) and Ibuprofen (aka Motrin or Advil) over the counter as directed. Take with food.    4) Return to the ER for any new or worsening symptoms.

## 2023-03-17 NOTE — ED ADULT TRIAGE NOTE - CHIEF COMPLAINT QUOTE
Had procedure to remove gangrenous ovarian cyst on 3/5, discharged 3/9. Developed pain in the RUQ abdomen with increased pain with breathing.

## 2023-03-17 NOTE — CONSULT NOTE ADULT - SUBJECTIVE AND OBJECTIVE BOX
MOLINA BAKER  42y  Female [id number]    HPI:  41 yo G0 LMP 3/7 presenting with RUQ pain. Patient was previously admitted to GYN 3/3-3/9 for TOA requiring SICU admission due to significant tachycardia and IR placement of a abdominal drain. Culture grew e coli sensitive to bactrim. Patient was discharged on a course of Bactrim, which she finished today. The patient had her drain pulled by IR yesterday. She states since after her IR procedure she was feeling sore, prompting her to use her upper body more frequently. This caused strain on her intercostal muscles and she endorses feeling sharp pain with exertion. She states it has improved with rest, however, she became nervous because it was difficult for her to take a deep breath without pain. She denies signs of infection. Denies fevers, chills, nausea, vomiting, diarrhea, abnormal vaginal discharge, urinary frequency, urgency, dysuria.       Name of GYN Physician: Dr. Cherry Mesa     Past OB:  G0   Past gyn: History of left sided TOA requiring hospital admission for IV abx. Denies fibroids, cysts, endometriosis, STI's, Abnormal pap smears   PMH: denies  PSH: wrist surgery  Meds: PNVs  All: NKDA  Soc: no substance use, anxiety/depression    Vital Signs Last 24 Hrs  T(C): 36.6 (17 Mar 2023 15:00), Max: 36.6 (17 Mar 2023 09:38)  T(F): 97.8 (17 Mar 2023 15:00), Max: 97.9 (17 Mar 2023 09:38)  HR: 79 (17 Mar 2023 15:00) (79 - 93)  BP: 99/61 (17 Mar 2023 15:00) (98/66 - 113/83)  BP(mean): --  RR: 18 (17 Mar 2023 15:00) (18 - 20)  SpO2: 99% (17 Mar 2023 15:00) (97% - 99%)    Parameters below as of 17 Mar 2023 15:00  Patient On (Oxygen Delivery Method): room air        Physical Exam:   General: sitting comfortably in bed, NAD   CV: RR S1S2 no m/r/g  Lungs: CTA b/l, good air flow b/l   Back: No CVA tenderness  Abd: Soft, non-tender, non-distended.  Bowel sounds present.    :  No bleeding on pad.    External labia wnl.  Bimanual exam with no cervical motion tenderness, uterus wnl, adnexa non palpable b/l.  Cervix closed vs. Cervix dilated    cm   Speculum Exam: No active bleeding from os.  Physiologic discharge.    Ext: non-tender b/l, no edema     LABS:                              9.1    9.69  )-----------( 939      ( 17 Mar 2023 11:04 )             29.1     03-17    135  |  97  |  14  ----------------------------<  79  4.4   |  25  |  0.54    Ca    9.9      17 Mar 2023 11:04    TPro  7.7  /  Alb  4.3  /  TBili  0.2  /  DBili  x   /  AST  45<H>  /  ALT  72<H>  /  AlkPhos  132<H>  03-17    I&O's Detail    PT/INR - ( 17 Mar 2023 11:31 )   PT: 14.2 sec;   INR: 1.22 ratio         PTT - ( 17 Mar 2023 11:31 )  PTT:52.7 sec  Urinalysis Basic - ( 17 Mar 2023 14:15 )    Color: Light Yellow / Appearance: Clear / SG: >1.050 / pH: x  Gluc: x / Ketone: Negative  / Bili: Negative / Urobili: Negative   Blood: x / Protein: 30 mg/dL / Nitrite: Negative   Leuk Esterase: Negative / RBC: 3 /hpf / WBC 3 /HPF   Sq Epi: x / Non Sq Epi: 4 /hpf / Bacteria: Negative        RADIOLOGY & ADDITIONAL STUDIES:    < from: CT Angio Chest PE Protocol w/ IV Cont (03.17.23 @ 12:48) >  CT ABDOMEN AND PELVIS   ORDERED BY: DAMON RODRÍGUEZ     ACC: [id number] EXAM:  CT ANGIO CHEST PULM ART [facility]   ORDERED BY: NIRMAL RAIAS     *** ADDENDUM # 1 ***    The correct impression is:    No pulmonary embolus.    Complex left adnexal cystic mass/collection is mildly decreased in size   and is now multiloculated with extension of the collection into the left   lower quadrant.    Short segment wall masslike wall thickening of the mid sigmoid colon,   concerning for neoplasm. Recommend further evaluation with colonoscopy.    New right-sided peritoneal nodularity. Differential includes foci of   infection or neoplasm.    In light of the sigmoid colon mass the lung nodules are highly suspicious   for metastatic disease.    --- End of Report ---    *** END OF ADDENDUM # 1 ***      PROCEDURE DATE:  03/17/2023          INTERPRETATION:  CLINICAL INFORMATION: Pleuritic chest pain, elevated   d-dimer, left ovarian collection. Right upper quadrant and right lower   chest wall pain.    COMPARISON: The abdomen and pelvis 3/16/2023, 3/2/2023. MRI abdomen and   pelvis 3/3/2023. CT chest 3/2/2023.    CONTRAST/COMPLICATIONS:  IV Contrast: Omnipaque 350 90 cc administered   10 cc discarded  Oral Contrast: NONE  Complications: None reported at time of study completion    PROCEDURE:  CT Angiography of the Chest was performed followed by portal venous phase   imaging of the Abdomen and Pelvis.  Sagittal and coronal reformats were performed as well as 3D (MIP)   reconstructions.    FINDINGS:  CHEST:  LUNGS AND LARGE AIRWAYS: Patent central airways. Multiple bilateral lung   nodules measuring up to 6 mm, unchanged from prior exam. The lungs are   otherwise clear.  PLEURA: No pleural effusion. No pneumothorax.  VESSELS: No pulmonary embolus. No aortic aneurysm or dissection.  HEART: Heart size is normal. No pericardial effusion.  MEDIASTINUM AND JASWINDER: No lymphadenopathy.  CHEST WALL AND LOWER NECK: Within normal limits.    ABDOMEN AND PELVIS:  LIVER: Within normal limits.  BILE DUCTS: Normal caliber.  GALLBLADDER: Within normal limits.  SPLEEN: Within normal limits.  PANCREAS: Within normal limits.  ADRENALS: Within normal limits.  KIDNEYS/URETERS: Within normal limits.    BLADDER: Within normal limits.  REPRODUCTIVEORGANS: The left adnexa is enlarged, measuring 7.6 x 4.4 cm,   and contains multiple follicles. Previously noted 9 cm left ovarian   collection is decreased, now measuring 2.5 cm. Multiple follicles within   the right adnexa. Fat stranding and inflammatory changes within the   pelvis.    BOWEL: No bowel obstruction. Appendix is not identified.  PERITONEUM: No ascites. Mesenteric nodules within the right hemiabdomen   (series 3 images 59, 62 and 80) measuring up to 1.2 cm, are new from   prior exam.  VESSELS: Within normal limits.  RETROPERITONEUM/LYMPH NODES: Mildly enlarged retroperitoneal and   mesenteric lymph nodes.  ABDOMINAL WALL: Within normal limits.  BONES: Within normal limits.    IMPRESSION:  No pulmonary embolus.    Multiple bilateral lung nodules measuring up to 6 mm are indeterminant.   Further workup or short-term follow-up recommended to rule out malignancy.    Previously noted 9 cm left ovarian collection is decreased, now measuring   2.5 cm.    Mesenteric nodules within the right hemiabdomen (series 3 images 59, 62   and 80) measuring up to 1.2 cm, are new from 3/2/2023. Although these may   represent small infectious collections secondary to left ovarian abscess,   malignancy cannot be ruled out. Recommend follow-up.    --- End of Report ---    ***Please see the addendum at the top of this report. It may contain   additional important information or changes.****        CHARLENE WALKER MD; Attending Radiologist  This document has been electronically signed. Mar 17 2023  2:50PM  1st Addendum: CHARLENE WALKER MD; Attending Radiologist  The first addendum was electronically signed on: Mar 17 2023  3:04PM.    < end of copied text >

## 2023-03-17 NOTE — ED ADULT NURSE NOTE - PRIMARY CARE PROVIDER
unk Paramedian Forehead Flap Division And Inset Text: Division and inset of the paramedian forehead flap was performed to achieve optimal aesthetic result, restore normal anatomic appearance and avoid distortion of normal anatomy, expedite and facilitate wound healing, achieve optimal functional result and because linear closure either not possible or would produce suboptimal result. The patient was prepped and draped in the usual manner. The pedicle was infiltrated with local anesthesia. The pedicle was sectioned with a #15 blade. The pedicle was de-bulked and trimmed to match the shape of the defect. Hemostasis was achieved. The flap donor site and free margin of the flap were secured with deep buried sutures and the wound edges were re-approximated.

## 2023-03-17 NOTE — ED PROVIDER NOTE - CLINICAL SUMMARY MEDICAL DECISION MAKING FREE TEXT BOX
Deepa Bentley MD  75y F pmhx htn, dm2, copd, SAH 2/2 cerebral aneurysm s/p pipeline stenting (Apr '21) on , R breast ca s/p mastectomy, asthma, migraines, post herpetic neuralgia, MDS follows at [facility] w/ Dr. Powers on chemo infusion therapy, presents the ED accompanied by daughter complaining of frontal headache with confusion and mild lightheadedness status post mechanical trip and fall while going up the stairs last night approximately 7 PM. No LOC. Patient was carrying food and lost her balance falling forward hitting her head on the top landing.  Fall witnessed by daughter who helped patient up immediately.  Daughter reports patient at her neurological baseline.  Denies vision changes, nausea, vomiting, joint pain, back pain, chest pain, shortness of breath, abdominal pain, urinary complaints.  Just prior to ED arrival patient was seen at monitor only for lab work and did not see a provider.  Has PMD appointment today at 3 PM. on exam. on exam, normal lung exam, right upper quadrant tenderness, no rebound, no guarding, concern for PE, r/o intraabdominal process. Plan: labs, CT abdomen and pelvis, if Ddimer is elevated will do CTA.

## 2023-03-18 ENCOUNTER — NON-APPOINTMENT (OUTPATIENT)
Age: 43
End: 2023-03-18

## 2023-03-18 LAB
CULTURE RESULTS: SIGNIFICANT CHANGE UP
SPECIMEN SOURCE: SIGNIFICANT CHANGE UP

## 2023-03-21 ENCOUNTER — APPOINTMENT (OUTPATIENT)
Dept: OBGYN | Facility: CLINIC | Age: 43
End: 2023-03-21
Payer: COMMERCIAL

## 2023-03-24 ENCOUNTER — NON-APPOINTMENT (OUTPATIENT)
Age: 43
End: 2023-03-24

## 2023-03-27 ENCOUNTER — NON-APPOINTMENT (OUTPATIENT)
Age: 43
End: 2023-03-27

## 2023-03-27 ENCOUNTER — APPOINTMENT (OUTPATIENT)
Dept: OBGYN | Facility: CLINIC | Age: 43
End: 2023-03-27
Payer: COMMERCIAL

## 2023-03-27 VITALS
SYSTOLIC BLOOD PRESSURE: 110 MMHG | HEIGHT: 63 IN | WEIGHT: 100 LBS | DIASTOLIC BLOOD PRESSURE: 76 MMHG | BODY MASS INDEX: 17.72 KG/M2

## 2023-03-27 PROCEDURE — 99213 OFFICE O/P EST LOW 20 MIN: CPT

## 2023-03-27 NOTE — PLAN
[FreeTextEntry1] : 43 y/o G0 LMP 3/1 s/p IR drainage of L TOA from 3/3-3/9 s/p IR drain removal on 3/16, s/p antibiotic treatment presents for follow up. \par \par Pt cleared to go back to work starting tomorrow\par will approve FMLA papers for her from 3/3-3/27. \par will follow up colonoscopy results\par will repeat TVUS to evaluate TOA in 3-6 months

## 2023-03-27 NOTE — HISTORY OF PRESENT ILLNESS
[FreeTextEntry1] : 43 y/o G0 LMP 3/1 s/p IR drainage of L TOA from 3/3-3/9 s/p IR drain removal on 3/16, s/p antibiotic treatment presents for follow up. \par \par Pt was seen in the ED on 3/17 due to RUQ pain \par On CTAP was found to have masslike wall thickening of the mid sigmoid colon, concerning for neoplasm \par Has colonoscopy scheduled for next week\par \par Went to urgent care this weekend due to vaginal discharge, s/p terconozole, symptoms have resolved with terconazole. \par \par Denies fevers, n/v. +achy abdominal pain which worsens with bowel movements.

## 2023-03-27 NOTE — PHYSICAL EXAM
[Appropriately responsive] : appropriately responsive [Alert] : alert [No Acute Distress] : no acute distress [Soft] : soft [Non-distended] : non-distended [Oriented x3] : oriented x3 [FreeTextEntry7] : +LLQ tenderness, no rebound, no guarding  [Labia Majora] : normal [Labia Minora] : normal [Normal] : normal [Uterine Adnexae] : non-palpable

## 2023-03-29 ENCOUNTER — APPOINTMENT (OUTPATIENT)
Dept: OBGYN | Facility: CLINIC | Age: 43
End: 2023-03-29
Payer: COMMERCIAL

## 2023-03-29 VITALS
DIASTOLIC BLOOD PRESSURE: 60 MMHG | TEMPERATURE: 97.7 F | HEART RATE: 89 BPM | OXYGEN SATURATION: 99 % | SYSTOLIC BLOOD PRESSURE: 112 MMHG | WEIGHT: 100 LBS | HEIGHT: 63 IN | BODY MASS INDEX: 17.72 KG/M2

## 2023-03-29 PROCEDURE — 99214 OFFICE O/P EST MOD 30 MIN: CPT

## 2023-03-29 NOTE — HISTORY OF PRESENT ILLNESS
[FreeTextEntry1] : S/P  ED 3/2/23 for LLQ pain Recent ski trip CT and pelvic sono showed Lt pelvic mass  Went to [facility] ED 3/3 Admitted to SICU for sepsis Triple IV antibiotics S/P IR drainage of Lt TOA on 3/5  Discharged home on 3/9\par \par F/U appts with Dr Mesa  on 3/15 and 3/27\par \par CT chest, abdomen and pelvis showed possible sigmoid mass and possible metastatic pulmonary nodule  Colonoscopy with Dr Munguia sched for 4/6 at [facility]\par \par Diarrhea has resolved Having 5 BM's daily

## 2023-03-30 ENCOUNTER — NON-APPOINTMENT (OUTPATIENT)
Age: 43
End: 2023-03-30

## 2023-03-30 ENCOUNTER — INPATIENT (INPATIENT)
Facility: HOSPITAL | Age: 43
LOS: 0 days | Discharge: HOME | DRG: 392 | End: 2023-03-31
Attending: INTERNAL MEDICINE | Admitting: INTERNAL MEDICINE
Payer: COMMERCIAL

## 2023-03-30 VITALS
RESPIRATION RATE: 18 BRPM | WEIGHT: 100.09 LBS | DIASTOLIC BLOOD PRESSURE: 74 MMHG | HEART RATE: 84 BPM | SYSTOLIC BLOOD PRESSURE: 109 MMHG | HEIGHT: 63 IN | OXYGEN SATURATION: 98 % | TEMPERATURE: 99 F

## 2023-03-30 DIAGNOSIS — R10.9 UNSPECIFIED ABDOMINAL PAIN: ICD-10-CM

## 2023-03-30 DIAGNOSIS — K52.9 NONINFECTIVE GASTROENTERITIS AND COLITIS, UNSPECIFIED: ICD-10-CM

## 2023-03-30 DIAGNOSIS — K62.5 HEMORRHAGE OF ANUS AND RECTUM: ICD-10-CM

## 2023-03-30 LAB
ALBUMIN SERPL ELPH-MCNC: 3.6 G/DL — SIGNIFICANT CHANGE UP (ref 3.3–5)
ALP SERPL-CCNC: 100 U/L — SIGNIFICANT CHANGE UP (ref 40–120)
ALT FLD-CCNC: 18 U/L — SIGNIFICANT CHANGE UP (ref 10–45)
ANION GAP SERPL CALC-SCNC: 7 MMOL/L — SIGNIFICANT CHANGE UP (ref 5–17)
APPEARANCE UR: CLEAR — SIGNIFICANT CHANGE UP
AST SERPL-CCNC: 13 U/L — SIGNIFICANT CHANGE UP (ref 10–40)
BACTERIA # UR AUTO: ABNORMAL /HPF
BASOPHILS # BLD AUTO: 0.06 K/UL — SIGNIFICANT CHANGE UP (ref 0–0.2)
BASOPHILS NFR BLD AUTO: 0.7 % — SIGNIFICANT CHANGE UP (ref 0–2)
BILIRUB SERPL-MCNC: 0.2 MG/DL — SIGNIFICANT CHANGE UP (ref 0.2–1.2)
BILIRUB UR-MCNC: NEGATIVE — SIGNIFICANT CHANGE UP
BUN SERPL-MCNC: 15 MG/DL — SIGNIFICANT CHANGE UP (ref 7–23)
CALCIUM SERPL-MCNC: 9.1 MG/DL — SIGNIFICANT CHANGE UP (ref 8.4–10.5)
CHLORIDE SERPL-SCNC: 105 MMOL/L — SIGNIFICANT CHANGE UP (ref 96–108)
CO2 SERPL-SCNC: 29 MMOL/L — SIGNIFICANT CHANGE UP (ref 22–31)
COD CRY URNS QL: ABNORMAL
COLOR SPEC: YELLOW — SIGNIFICANT CHANGE UP
CREAT SERPL-MCNC: 0.62 MG/DL — SIGNIFICANT CHANGE UP (ref 0.5–1.3)
DIFF PNL FLD: NEGATIVE — SIGNIFICANT CHANGE UP
EGFR: 115 ML/MIN/1.73M2 — SIGNIFICANT CHANGE UP
EOSINOPHIL # BLD AUTO: 0.5 K/UL — SIGNIFICANT CHANGE UP (ref 0–0.5)
EOSINOPHIL NFR BLD AUTO: 5.7 % — SIGNIFICANT CHANGE UP (ref 0–6)
EPI CELLS # UR: ABNORMAL
FLUAV AG NPH QL: SIGNIFICANT CHANGE UP
FLUBV AG NPH QL: SIGNIFICANT CHANGE UP
GLUCOSE SERPL-MCNC: 75 MG/DL — SIGNIFICANT CHANGE UP (ref 70–99)
GLUCOSE UR QL: NEGATIVE — SIGNIFICANT CHANGE UP
HCG SERPL-ACNC: <1 MIU/ML — SIGNIFICANT CHANGE UP
HCT VFR BLD CALC: 27.5 % — LOW (ref 34.5–45)
HGB BLD-MCNC: 8.5 G/DL — LOW (ref 11.5–15.5)
IMM GRANULOCYTES NFR BLD AUTO: 0.3 % — SIGNIFICANT CHANGE UP (ref 0–0.9)
KETONES UR-MCNC: NEGATIVE — SIGNIFICANT CHANGE UP
LEUKOCYTE ESTERASE UR-ACNC: NEGATIVE — SIGNIFICANT CHANGE UP
LIDOCAIN IGE QN: 139 U/L — SIGNIFICANT CHANGE UP (ref 73–393)
LYMPHOCYTES # BLD AUTO: 2.32 K/UL — SIGNIFICANT CHANGE UP (ref 1–3.3)
LYMPHOCYTES # BLD AUTO: 26.7 % — SIGNIFICANT CHANGE UP (ref 13–44)
MAGNESIUM SERPL-MCNC: 2 MG/DL — SIGNIFICANT CHANGE UP (ref 1.6–2.6)
MCHC RBC-ENTMCNC: 22.7 PG — LOW (ref 27–34)
MCHC RBC-ENTMCNC: 30.9 GM/DL — LOW (ref 32–36)
MCV RBC AUTO: 73.5 FL — LOW (ref 80–100)
MONOCYTES # BLD AUTO: 0.73 K/UL — SIGNIFICANT CHANGE UP (ref 0–0.9)
MONOCYTES NFR BLD AUTO: 8.4 % — SIGNIFICANT CHANGE UP (ref 2–14)
NEUTROPHILS # BLD AUTO: 5.06 K/UL — SIGNIFICANT CHANGE UP (ref 1.8–7.4)
NEUTROPHILS NFR BLD AUTO: 58.2 % — SIGNIFICANT CHANGE UP (ref 43–77)
NITRITE UR-MCNC: NEGATIVE — SIGNIFICANT CHANGE UP
NRBC # BLD: 0 /100 WBCS — SIGNIFICANT CHANGE UP (ref 0–0)
PH UR: 6 — SIGNIFICANT CHANGE UP (ref 5–8)
PLATELET # BLD AUTO: 417 K/UL — HIGH (ref 150–400)
POTASSIUM SERPL-MCNC: 3.6 MMOL/L — SIGNIFICANT CHANGE UP (ref 3.5–5.3)
POTASSIUM SERPL-SCNC: 3.6 MMOL/L — SIGNIFICANT CHANGE UP (ref 3.5–5.3)
PROT SERPL-MCNC: 7.5 G/DL — SIGNIFICANT CHANGE UP (ref 6–8.3)
PROT UR-MCNC: 15
RBC # BLD: 3.74 M/UL — LOW (ref 3.8–5.2)
RBC # FLD: 18.1 % — HIGH (ref 10.3–14.5)
RBC CASTS # UR COMP ASSIST: SIGNIFICANT CHANGE UP /HPF (ref 0–4)
RSV RNA NPH QL NAA+NON-PROBE: SIGNIFICANT CHANGE UP
SARS-COV-2 RNA SPEC QL NAA+PROBE: SIGNIFICANT CHANGE UP
SODIUM SERPL-SCNC: 141 MMOL/L — SIGNIFICANT CHANGE UP (ref 135–145)
SP GR SPEC: 1.02 — SIGNIFICANT CHANGE UP (ref 1.01–1.02)
UROBILINOGEN FLD QL: NEGATIVE — SIGNIFICANT CHANGE UP
WBC # BLD: 8.7 K/UL — SIGNIFICANT CHANGE UP (ref 3.8–10.5)
WBC # FLD AUTO: 8.7 K/UL — SIGNIFICANT CHANGE UP (ref 3.8–10.5)
WBC UR QL: SIGNIFICANT CHANGE UP /HPF (ref 0–5)

## 2023-03-30 PROCEDURE — 99223 1ST HOSP IP/OBS HIGH 75: CPT

## 2023-03-30 PROCEDURE — 74177 CT ABD & PELVIS W/CONTRAST: CPT | Mod: 26,MA

## 2023-03-30 PROCEDURE — 99285 EMERGENCY DEPT VISIT HI MDM: CPT

## 2023-03-30 PROCEDURE — 93010 ELECTROCARDIOGRAM REPORT: CPT

## 2023-03-30 RX ORDER — SODIUM CHLORIDE 9 MG/ML
1000 INJECTION INTRAMUSCULAR; INTRAVENOUS; SUBCUTANEOUS ONCE
Refills: 0 | Status: COMPLETED | OUTPATIENT
Start: 2023-03-30 | End: 2023-03-30

## 2023-03-30 RX ORDER — KETOROLAC TROMETHAMINE 30 MG/ML
15 SYRINGE (ML) INJECTION ONCE
Refills: 0 | Status: DISCONTINUED | OUTPATIENT
Start: 2023-03-30 | End: 2023-03-30

## 2023-03-30 RX ORDER — ACETAMINOPHEN 500 MG
675 TABLET ORAL ONCE
Refills: 0 | Status: COMPLETED | OUTPATIENT
Start: 2023-03-30 | End: 2023-03-30

## 2023-03-30 RX ADMIN — SODIUM CHLORIDE 1000 MILLILITER(S): 9 INJECTION INTRAMUSCULAR; INTRAVENOUS; SUBCUTANEOUS at 19:23

## 2023-03-30 RX ADMIN — Medication 15 MILLIGRAM(S): at 22:00

## 2023-03-30 RX ADMIN — Medication 270 MILLIGRAM(S): at 19:23

## 2023-03-30 RX ADMIN — SODIUM CHLORIDE 1000 MILLILITER(S): 9 INJECTION INTRAMUSCULAR; INTRAVENOUS; SUBCUTANEOUS at 23:04

## 2023-03-30 NOTE — CONSULT NOTE ADULT - PROBLEM SELECTOR RECOMMENDATION 9
Diarrhea in the setting of recent antibiotic use, recent TOA s/p drainage.  Send stool for culture, O/P. c.diff, lactoferrin.  IV hydration, PO intake as tolerated.

## 2023-03-30 NOTE — CONSULT NOTE ADULT - ASSESSMENT
42 year old woman with multiple loose watery bowel movements with likely dehydration after recent antibiotic treatment for TOA.

## 2023-03-30 NOTE — ED PROVIDER NOTE - PROGRESS NOTE DETAILS
CT shows decreased size of TOA. Also with R ovarian cyst which is unlikely to contribute to patient pain and diarrhea. Seen by GI who recommends sending stool samples and following CBC. She is scheduled for colonoscopy next Thursday but if gets admitted may need more urgent colonoscopy. Patient continues to have pain. Discussed options with patient, will admit to medical service for pain control and f/up stool studies. Endorses to hospitalist at time of admission.

## 2023-03-30 NOTE — ED PROVIDER NOTE - OBJECTIVE STATEMENT
41 yo F hx recent TOA s/p drainage by IR and bactrim tx presentign with diarrhea and LLQ pain x 1-2 weeks. States has been having severe LLQ pain for past week. Worse this morning, not responding to pain meds at home so called Gyn Dr. Donaldson who told her to come to ed for GI consult with Dr. Munguia. Denies f/c, vb/vd, dysuria, RLQ pain, chest pain, sob, lightheadedness. States occassionally also has rectal bleeding she describes as bright red. Took some medication for her hemorrhoids and states bloody BM has improved. Denies bleeding from other source. No aspirin or AC use.

## 2023-03-30 NOTE — ED PROVIDER NOTE - NS ED ROS FT
CONSTITUTIONAL: No fevers, chills, fatigue, dizziness, weakness, unexpected weight change  EYES: No double vision, blurry vision  ENT: No ear pain, nasal congestion, runny nose, sore throat  CV: No chest pain, palpitations  PULM: No cough, shortness of breath  GI: + abdominal pain, nausea, diarrhea. No vomiting or constipation  : No dysuria, polyuria, hematuria  SKIN: No rashes, swelling  MSK: No muscle aches  NEURO: No headache, paresthesias  PSYCHIATRIC: Denies suicidal, homicidal ideations. No auditory, visual, tactile hallucinations

## 2023-03-30 NOTE — CONSULT NOTE ADULT - SUBJECTIVE AND OBJECTIVE BOX
GI Consult    41 yo F PMDHx signifcant for tuboovarian abscess, s/p abdominal drain by IR, DC'd home w/oral Bactrim.   Pt contacted GYN Dr. Donaldson today with c/o LLQ pain and significant diarrheal stools (reported 12 BMs in this morning) described as loose, small amount of blood per her description. Denies fever, chills, nausea. Felt dehydrated.          PAST MEDICAL & SURGICAL HISTORY:  Drainage of L TOA    MEDICATIONS  (STANDING):    MEDICATIONS  (PRN):    Allergies    oxycodone (Pruritus)    Intolerances            Social History:  Denies tobacco. Social EtOH    FAMILY HISTORY:  Denies FHx IBD, colon cancer, stomach cancer          PHYSICAL EXAM:   Vital Signs:  Vital Signs Last 24 Hrs  T(C): 37.3 (30 Mar 2023 16:10), Max: 37.3 (30 Mar 2023 16:10)  T(F): 99.1 (30 Mar 2023 16:10), Max: 99.1 (30 Mar 2023 16:10)  HR: 84 (30 Mar 2023 16:10) (84 - 84)  BP: 109/74 (30 Mar 2023 16:10) (109/74 - 109/74)  BP(mean): --  RR: 18 (30 Mar 2023 16:10) (18 - 18)  SpO2: 98% (30 Mar 2023 16:10) (98% - 98%)    Parameters below as of 30 Mar 2023 16:10  Patient On (Oxygen Delivery Method): room air      Daily Height in cm: 160.02 (30 Mar 2023 16:10)    Daily I&O's Summary    ROS: GI ROS per HPI    GENERAL:   NAD  HEENT:  NC/AT,  anicteric sclera  CHEST:  Clear B/L  HEART:  RRR  ABDOMEN:  Soft, +mild tenderness in LLQ without rebound, non-distended, +BS,  no masses palpable  EXTR:  no edema  SKIN:  No rash or jaundice  NEURO:  Awake, alert    LABS:                        8.5    8.70  )-----------( 417      ( 30 Mar 2023 18:25 )             27.5       03-30    141  |  105  |  15  ----------------------------<  75  3.6   |  29  |  0.62    Ca    9.1      30 Mar 2023 18:25  Mg     2.0         TPro  7.5  /  Alb  3.6  /  TBili  0.2  /  DBili  x   /  AST  13  /  ALT  18  /  AlkPhos  100            Urinalysis Basic - ( 30 Mar 2023 18:30 )    Color: Yellow / Appearance: Clear / S.020 / pH: x  Gluc: x / Ketone: Negative  / Bili: Negative / Urobili: Negative   Blood: x / Protein: 15 / Nitrite: Negative   Leuk Esterase: Negative / RBC: 0-4 /HPF / WBC 0-2 /HPF   Sq Epi: x / Non Sq Epi: Moderate / Bacteria: Few /HPF    Lipase: Lipase, Serum: 139 U/L ( @ 18:25)      RADIOLOGY & ADDITIONAL TESTS:    ACC: [id number] EXAM:  CT ABDOMEN AND PELVIS IC   ORDERED BY:  MICHAEL WARNER     PROCEDURE DATE:  2023          INTERPRETATION:  CLINICAL INFORMATION: Left lower quadrant pain, diarrhea    COMPARISON: 2023    CONTRAST/COMPLICATIONS:  IV Contrast: Omnipaque 350  Oral Contrast: NONE  Complications: None reported at time of study completion    PROCEDURE:  CT of the Abdomen and Pelvis was performed.  Sagittal and coronal reformats were performed.    FINDINGS:  LOWER CHEST: Bilateral lower lobe pulmonary nodules are stable.    LIVER: Within normal limits.  BILE DUCTS: Normal caliber.  GALLBLADDER: Within normal limits.  SPLEEN: Within normal limits.  PANCREAS: Within normal limits.  ADRENALS: Within normal limits.  KIDNEYS/URETERS: Within normal limits.    BLADDER: Within normal limits.  REPRODUCTIVE ORGANS: Uterus is unremarkable. Multiple peripherally   enhancing cystic structures in the left adnexa measuring up to 2.8 x 1.4   cm, overall decreased in size. Mild stranding of the visceral pelvic fat.   New right adnexal cystic lesion with layering high density measuring 3.4   x 4.0 cm.    BOWEL: No bowel obstruction. Appendix is normal.  PERITONEUM: No ascites.  VESSELS: Within normal limits.  RETROPERITONEUM/LYMPH NODES: No lymphadenopathy.  ABDOMINAL WALL: Within normal limits.  BONES: Within normal limits.    IMPRESSION:  Multiple cystic structures in the left adnexa likely representing   improving tubo-ovarian abscesses.    4 cm right adnexal cyst most compatible with a hemorrhagic cyst.        --- End of Report ---

## 2023-03-30 NOTE — ED PROVIDER NOTE - PHYSICAL EXAMINATION
GENERAL: Vital signs are within normal limits  EYES: Conjunctiva noninjected or pale, sclera anicteric  HENT: NC/AT, moist mucous membranes  NECK: Supple, trachea midline  LUNG: Nonlabored respirations, no wheezes, rales  CV: RRR, Pulses- Radial/dorsalis pedis: 2+ bilateral and equal  ABDOMEN: Nondistended, + LLQ ttp, no rebound or gauarding  MSK: No visible deformities, nontender extremities  SKIN: No rashes, bruises  NEURO: AAOx4 (to person, place, time, event), no tremor, steady gait  PSYCH: Normal mood and affect

## 2023-03-30 NOTE — ED PROVIDER NOTE - CLINICAL SUMMARY MEDICAL DECISION MAKING FREE TEXT BOX
43 yo F hx recent TOA s/p drainage by IR and bactrim tx presentign with diarrhea and LLQ pain x 1-2 weeks. States has been having severe LLQ pain for past week. Worse this morning, not responding to pain meds at home so called Gyn Dr. Donaldson who told her to come to ed for GI consult with Dr. Munguia. Denies f/c, vb/vd, dysuria, RLQ pain, chest pain, sob, lightheadedness. States occassionally also has rectal bleeding she describes as bright red. Took some medication for her hemorrhoids and states bloody BM has improved. Denies bleeding from other source. No aspirin or AC use.   On exam pt with normal vitals. Has severe LLQ ttp. Concern for fistula formation 2/2 recent TOA vs recurrence of TOA vs diverticulitis vs enteritis vs other intraabdominal infction/source of diarrhea with occassional blood. Will get labs, CTAP. Will give fluids pain meds and reassess. Will touch base with Dr. Munguia of GI for further recs given pt sent in for GI evaluation.

## 2023-03-30 NOTE — CONSULT NOTE ADULT - PROBLEM SELECTOR RECOMMENDATION 2
No obvious bowel pathology noted on CT.  Scheduled for colonoscopy 4/6.  Follow CBC. If anemia worsens consider earlier procedure.

## 2023-03-31 ENCOUNTER — TRANSCRIPTION ENCOUNTER (OUTPATIENT)
Age: 43
End: 2023-03-31

## 2023-03-31 VITALS
SYSTOLIC BLOOD PRESSURE: 113 MMHG | DIASTOLIC BLOOD PRESSURE: 62 MMHG | HEART RATE: 72 BPM | OXYGEN SATURATION: 98 % | RESPIRATION RATE: 14 BRPM

## 2023-03-31 DIAGNOSIS — N70.93 SALPINGITIS AND OOPHORITIS, UNSPECIFIED: Chronic | ICD-10-CM

## 2023-03-31 LAB
ALBUMIN SERPL ELPH-MCNC: 3.1 G/DL — LOW (ref 3.3–5)
ALP SERPL-CCNC: 81 U/L — SIGNIFICANT CHANGE UP (ref 40–120)
ALT FLD-CCNC: 21 U/L — SIGNIFICANT CHANGE UP (ref 10–45)
ANION GAP SERPL CALC-SCNC: 9 MMOL/L — SIGNIFICANT CHANGE UP (ref 5–17)
AST SERPL-CCNC: 12 U/L — SIGNIFICANT CHANGE UP (ref 10–40)
BASOPHILS # BLD AUTO: 0.05 K/UL — SIGNIFICANT CHANGE UP (ref 0–0.2)
BASOPHILS NFR BLD AUTO: 0.6 % — SIGNIFICANT CHANGE UP (ref 0–2)
BILIRUB SERPL-MCNC: 0.3 MG/DL — SIGNIFICANT CHANGE UP (ref 0.2–1.2)
BLD GP AB SCN SERPL QL: SIGNIFICANT CHANGE UP
BUN SERPL-MCNC: 14 MG/DL — SIGNIFICANT CHANGE UP (ref 7–23)
C DIFF BY PCR RESULT: SIGNIFICANT CHANGE UP
CALCIUM SERPL-MCNC: 8.5 MG/DL — SIGNIFICANT CHANGE UP (ref 8.4–10.5)
CHLORIDE SERPL-SCNC: 109 MMOL/L — HIGH (ref 96–108)
CO2 SERPL-SCNC: 25 MMOL/L — SIGNIFICANT CHANGE UP (ref 22–31)
CREAT SERPL-MCNC: 0.64 MG/DL — SIGNIFICANT CHANGE UP (ref 0.5–1.3)
CULTURE RESULTS: SIGNIFICANT CHANGE UP
CULTURE RESULTS: SIGNIFICANT CHANGE UP
EGFR: 114 ML/MIN/1.73M2 — SIGNIFICANT CHANGE UP
EOSINOPHIL # BLD AUTO: 0.54 K/UL — HIGH (ref 0–0.5)
EOSINOPHIL NFR BLD AUTO: 6.8 % — HIGH (ref 0–6)
GLUCOSE SERPL-MCNC: 85 MG/DL — SIGNIFICANT CHANGE UP (ref 70–99)
HCT VFR BLD CALC: 25.6 % — LOW (ref 34.5–45)
HGB BLD-MCNC: 8 G/DL — LOW (ref 11.5–15.5)
IMM GRANULOCYTES NFR BLD AUTO: 0.3 % — SIGNIFICANT CHANGE UP (ref 0–0.9)
LYMPHOCYTES # BLD AUTO: 1.9 K/UL — SIGNIFICANT CHANGE UP (ref 1–3.3)
LYMPHOCYTES # BLD AUTO: 23.9 % — SIGNIFICANT CHANGE UP (ref 13–44)
MAGNESIUM SERPL-MCNC: 1.8 MG/DL — SIGNIFICANT CHANGE UP (ref 1.6–2.6)
MCHC RBC-ENTMCNC: 22.7 PG — LOW (ref 27–34)
MCHC RBC-ENTMCNC: 31.3 GM/DL — LOW (ref 32–36)
MCV RBC AUTO: 72.5 FL — LOW (ref 80–100)
MONOCYTES # BLD AUTO: 0.63 K/UL — SIGNIFICANT CHANGE UP (ref 0–0.9)
MONOCYTES NFR BLD AUTO: 7.9 % — SIGNIFICANT CHANGE UP (ref 2–14)
NEUTROPHILS # BLD AUTO: 4.8 K/UL — SIGNIFICANT CHANGE UP (ref 1.8–7.4)
NEUTROPHILS NFR BLD AUTO: 60.5 % — SIGNIFICANT CHANGE UP (ref 43–77)
NRBC # BLD: 0 /100 WBCS — SIGNIFICANT CHANGE UP (ref 0–0)
PLATELET # BLD AUTO: 346 K/UL — SIGNIFICANT CHANGE UP (ref 150–400)
POTASSIUM SERPL-MCNC: 4.2 MMOL/L — SIGNIFICANT CHANGE UP (ref 3.5–5.3)
POTASSIUM SERPL-SCNC: 4.2 MMOL/L — SIGNIFICANT CHANGE UP (ref 3.5–5.3)
PROT SERPL-MCNC: 6.5 G/DL — SIGNIFICANT CHANGE UP (ref 6–8.3)
RBC # BLD: 3.53 M/UL — LOW (ref 3.8–5.2)
RBC # FLD: 17.9 % — HIGH (ref 10.3–14.5)
SODIUM SERPL-SCNC: 143 MMOL/L — SIGNIFICANT CHANGE UP (ref 135–145)
SPECIMEN SOURCE: SIGNIFICANT CHANGE UP
SPECIMEN SOURCE: SIGNIFICANT CHANGE UP
WBC # BLD: 7.94 K/UL — SIGNIFICANT CHANGE UP (ref 3.8–10.5)
WBC # FLD AUTO: 7.94 K/UL — SIGNIFICANT CHANGE UP (ref 3.8–10.5)

## 2023-03-31 PROCEDURE — 87046 STOOL CULTR AEROBIC BACT EA: CPT

## 2023-03-31 PROCEDURE — 80053 COMPREHEN METABOLIC PANEL: CPT

## 2023-03-31 PROCEDURE — 87086 URINE CULTURE/COLONY COUNT: CPT

## 2023-03-31 PROCEDURE — 85025 COMPLETE CBC W/AUTO DIFF WBC: CPT

## 2023-03-31 PROCEDURE — 83631 LACTOFERRIN FECAL (QUANT): CPT

## 2023-03-31 PROCEDURE — 87077 CULTURE AEROBIC IDENTIFY: CPT

## 2023-03-31 PROCEDURE — 87637 SARSCOV2&INF A&B&RSV AMP PRB: CPT

## 2023-03-31 PROCEDURE — 86901 BLOOD TYPING SEROLOGIC RH(D): CPT

## 2023-03-31 PROCEDURE — 81001 URINALYSIS AUTO W/SCOPE: CPT

## 2023-03-31 PROCEDURE — 87493 C DIFF AMPLIFIED PROBE: CPT

## 2023-03-31 PROCEDURE — 96374 THER/PROPH/DIAG INJ IV PUSH: CPT

## 2023-03-31 PROCEDURE — 84702 CHORIONIC GONADOTROPIN TEST: CPT

## 2023-03-31 PROCEDURE — 87177 OVA AND PARASITES SMEARS: CPT

## 2023-03-31 PROCEDURE — 86850 RBC ANTIBODY SCREEN: CPT

## 2023-03-31 PROCEDURE — 93005 ELECTROCARDIOGRAM TRACING: CPT

## 2023-03-31 PROCEDURE — 99285 EMERGENCY DEPT VISIT HI MDM: CPT

## 2023-03-31 PROCEDURE — 96375 TX/PRO/DX INJ NEW DRUG ADDON: CPT

## 2023-03-31 PROCEDURE — 86900 BLOOD TYPING SEROLOGIC ABO: CPT

## 2023-03-31 PROCEDURE — 74177 CT ABD & PELVIS W/CONTRAST: CPT | Mod: MA

## 2023-03-31 PROCEDURE — 83690 ASSAY OF LIPASE: CPT

## 2023-03-31 PROCEDURE — 83735 ASSAY OF MAGNESIUM: CPT

## 2023-03-31 PROCEDURE — 87045 FECES CULTURE AEROBIC BACT: CPT

## 2023-03-31 PROCEDURE — 99239 HOSP IP/OBS DSCHRG MGMT >30: CPT

## 2023-03-31 PROCEDURE — 36415 COLL VENOUS BLD VENIPUNCTURE: CPT

## 2023-03-31 RX ORDER — AER TRAVELER 0.5 G/1
0 SOLUTION RECTAL; TOPICAL
Qty: 60 | Refills: 0
Start: 2023-03-31

## 2023-03-31 RX ORDER — PANTOPRAZOLE SODIUM 20 MG/1
40 TABLET, DELAYED RELEASE ORAL DAILY
Refills: 0 | Status: DISCONTINUED | OUTPATIENT
Start: 2023-03-31 | End: 2023-03-31

## 2023-03-31 RX ORDER — HYDROCORTISONE 1 %
1 OINTMENT (GRAM) TOPICAL
Qty: 1 | Refills: 0
Start: 2023-03-31 | End: 2023-04-09

## 2023-03-31 RX ORDER — SODIUM CHLORIDE 9 MG/ML
1000 INJECTION INTRAMUSCULAR; INTRAVENOUS; SUBCUTANEOUS
Refills: 0 | Status: DISCONTINUED | OUTPATIENT
Start: 2023-03-31 | End: 2023-03-31

## 2023-03-31 RX ORDER — ACETAMINOPHEN 500 MG
650 TABLET ORAL EVERY 6 HOURS
Refills: 0 | Status: DISCONTINUED | OUTPATIENT
Start: 2023-03-31 | End: 2023-03-31

## 2023-03-31 RX ORDER — LACTOBACILLUS ACIDOPHILUS 100MM CELL
1 CAPSULE ORAL
Refills: 0 | Status: DISCONTINUED | OUTPATIENT
Start: 2023-03-31 | End: 2023-03-31

## 2023-03-31 RX ADMIN — PANTOPRAZOLE SODIUM 40 MILLIGRAM(S): 20 TABLET, DELAYED RELEASE ORAL at 11:48

## 2023-03-31 RX ADMIN — SODIUM CHLORIDE 70 MILLILITER(S): 9 INJECTION INTRAMUSCULAR; INTRAVENOUS; SUBCUTANEOUS at 06:50

## 2023-03-31 RX ADMIN — Medication 675 MILLIGRAM(S): at 02:12

## 2023-03-31 RX ADMIN — Medication 650 MILLIGRAM(S): at 02:17

## 2023-03-31 RX ADMIN — Medication 650 MILLIGRAM(S): at 02:46

## 2023-03-31 RX ADMIN — Medication 15 MILLIGRAM(S): at 02:46

## 2023-03-31 RX ADMIN — Medication 1 TABLET(S): at 08:43

## 2023-03-31 RX ADMIN — Medication 650 MILLIGRAM(S): at 11:48

## 2023-03-31 RX ADMIN — Medication 650 MILLIGRAM(S): at 06:48

## 2023-03-31 NOTE — DISCHARGE NOTE PROVIDER - CARE PROVIDER_API CALL
Davidson Munguia (DO)  Gastroenterology  [address], [address]  [place] [address]  Phone: [phone]  Fax: [phone]  Follow Up Time:

## 2023-03-31 NOTE — PROGRESS NOTE ADULT - ASSESSMENT
42 F hx of recent TOA s/p IR drain 3/5/23 s/p drain removal 3/16/23 completed outpt Bactrim course 3/18/23 pw intractable diarrhea and abd pain. Pt related diarrhea commenced within the first 2 days of antibx use typical with 2-3 BMs in the AM and another 2-3 later in the day. Diarrhea associated with significant LLQ discomfort partially relieved with ibuprofen. Appetite generally not affected. Today with 12-13 episodes of diarrhea within 3 hr period associated with severe LLQ pain and came to ED. Denied any fevers, chills, NV, dysuria, flank pain. Today with some bloody diarrhea and mucous.     GI following for diarrhea  Patient d/w Dr. Ezra Mobley, PAC  available on Teams  Department of Gastroenterology  GI cell: [phone]

## 2023-03-31 NOTE — PROGRESS NOTE ADULT - ASSESSMENT
42 F hx of recent Tubo ovarian abscess, s/p IR drain 3/5/23 s/p drain removal 3/16/23 d/c home with bactrim 3/18/23, now admitted for intractable diarrhea with blood and abd pain.      # severe LLQ pain and severe Diarrhea with blood - r/o C.DIFF, in the setting of recent ABTX use.   c/o loose stool x 3 since this am, with half teaspoon blood since this am, continues to have LLQ pain  tolerated clears last night, advance to regular this am  GI consult appreciated- plan for colonoscopy 4/6  no leukocytosis  c/w IVFs.   stool for C diff, pending results  GI pcr, stool cx, O/P- pending  monitor lytes   probiotics.  Hold antibxs for now as no overt colitis on CTAP.     Anemia-  Hb has been 7.1 to 10 since early march/2023  hb low, Mean Cell Hemoglobin: 22.7 pg (03.31.23 @ 05:00)  Hemoglobin: 10.3 g/dL (03.02.23 @ 13:40)  no h/o PRB'S  will continue to monitor  transfuse if<7    vte ppx- early ambulation    gi ppx- ppi    plan d/w patient and Sister at bedside  dispo- home  when cleared by GI  42 F hx of recent Tubo ovarian abscess, s/p IR drain 3/5/23 s/p drain removal 3/16/23 d/c home with bactrim 3/18/23, now admitted for intractable diarrhea with blood and abd pain.      # severe LLQ pain and severe Diarrhea with blood - r/o C.DIFF, in the setting of recent ABTX use- symptoms improved    loose stool x 3 since this am, with half teaspoon blood since this am,   GI consult appreciated- blood in stool likely due to hemorrhoids plan for colonoscopy 4/6, cleared by GI for d/c home today  tolerated clears last night, advanced to regular this am  anusol ordered by GI to pharmacy   no leukocytosis  stool for C diff, negative  probiotics.  no need for antibxs as no overt colitis on CTAP.     Anemia-  Hb has been 7.1 to 10 since early march/2023  hb low, Mean Cell Hemoglobin: 22.7 pg (03.31.23 @ 05:00)  Hemoglobin: 10.3 g/dL (03.02.23 @ 13:40)  no h/o PRB'S  f/w with pmd for blood work    vte ppx- early ambulation    gi ppx- ppi    plan d/w patient and Sister at bedside  dispo- home cleared by GI as above

## 2023-03-31 NOTE — DISCHARGE NOTE PROVIDER - NSDCFUSCHEDAPPT_GEN_ALL_CORE_FT
[facility]  GASTRO CORDERO [address]  Scheduled Appointment: 04/06/2023    Davidson Munguia  [facility]  GC PreAdmits  Scheduled Appointment: 04/06/2023    [facility]  OBGYNGEN [address] [place] [address]  Scheduled Appointment: 04/10/2023

## 2023-03-31 NOTE — H&P ADULT - NSHPPHYSICALEXAM_GEN_ALL_CORE
Vital Signs Last 24 Hrs  T(C): 36.9 (30 Mar 2023 23:30), Max: 37.3 (30 Mar 2023 16:10)  T(F): 98.4 (30 Mar 2023 23:30), Max: 99.1 (30 Mar 2023 16:10)  HR: 75 (30 Mar 2023 23:30) (75 - 84)  BP: 101/67 (30 Mar 2023 23:30) (101/67 - 109/74)  BP(mean): --  RR: 16 (30 Mar 2023 23:30) (16 - 18)  SpO2: 98% (30 Mar 2023 23:30) (98% - 98%)    Parameters below as of 30 Mar 2023 16:10  Patient On (Oxygen Delivery Method): room air      Daily Height in cm: 160.02 (30 Mar 2023 16:10)    Daily   CAPILLARY BLOOD GLUCOSE        I&O's Summary      GENERAL: NAD  HEAD:  Normocephalic  EYES: EOMI, PERRLA, conjunctiva and sclera clear  ENMT: No tonsillar erythema, exudates, or enlargement; Moist mucous membranes, No lesions  NECK: Supple, No JVD, no bruit, normal thyroid  NERVOUS SYSTEM:  Alert & Oriented X3, Good concentration; grossly  Motor Strength 5/5 B/L upper and lower extremities; DTRs 2+ intact and symmetric  CHEST/LUNG: Clear to auscultation bilaterally; No rales, rhonchi, wheezing, or rubs  HEART: Regular rate and rhythm; No murmurs, rubs, or gallops  ABDOMEN: Soft, Nondistended, mild diffuse abd tenderness on palp. no rigoberto or rigidity. Drain site closed with no drainage.  Bowel sounds present  EXTREMITIES:  2+ Peripheral Pulses, No clubbing, cyanosis, or edema  LYMPH: No lymphadenopathy noted  SKIN: No rashes or lesions

## 2023-03-31 NOTE — H&P ADULT - NSHPLABSRESULTS_GEN_ALL_CORE
8.5    8.70  )-----------( 417      ( 30 Mar 2023 18:25 )             27.5           141  |  105  |  15  ----------------------------<  75  3.6   |  29  |  0.62    Ca    9.1      30 Mar 2023 18:25  Mg     2.0         TPro  7.5  /  Alb  3.6  /  TBili  0.2  /  DBili  x   /  AST  13  /  ALT  18  /  AlkPhos  100         Magnesium, Serum: 2.0 mg/dL [1.6 - 2.6] (23 @ 18:25)    LIVER FUNCTIONS - ( 30 Mar 2023 18:25 )  Alb: 3.6 g/dL / Pro: 7.5 g/dL / ALK PHOS: 100 U/L / ALT: 18 U/L / AST: 13 U/L / GGT: x             Lipase, Serum: 139 U/L (23 @ 18:25)              Urinalysis Basic - ( 30 Mar 2023 18:30 )    Color: Yellow / Appearance: Clear / S.020 / pH: x  Gluc: x / Ketone: Negative  / Bili: Negative / Urobili: Negative   Blood: x / Protein: 15 / Nitrite: Negative   Leuk Esterase: Negative / RBC: 0-4 /HPF / WBC 0-2 /HPF   Sq Epi: x / Non Sq Epi: Moderate / Bacteria: Few /HPF        CAPILLARY BLOOD GLUCOSE            EKG: personally rev. NSR at 66 bpm no acute ST changes.       rad< from: CT Abdomen and Pelvis w/ IV Cont (23 @ 19:19) >    IMPRESSION:  Multiple cystic structures in the left adnexa likely representing   improving tubo-ovarian abscesses.    4 cm right adnexal cyst most compatible with a hemorrhagic cyst.    < end of copied text >

## 2023-03-31 NOTE — PROGRESS NOTE ADULT - PROBLEM SELECTOR PLAN 2
large external hemorrhoids noted  Witch hazel pads for comfort  Anusol 2.5% cream to rectum BID  Pt has outpatient colonoscopy on 4/6 with Dr. Munguia  continue to monitor

## 2023-03-31 NOTE — H&P ADULT - ASSESSMENT
42 F hx of recent TOA s/p IR drain 3/5/23 s/p drain removal 3/16/23 and antibx course pw intractable diarrhea and abd pain    # Diarrhea.   GI consult appreciated.  IVFs.   check stool for C diff, GI pcr, stool cx, O/P  monitor lytes   probiotics.  Hold antibxs for now as no overt colitis on CTAP.     Sister at bedside updated Maryann [phone]

## 2023-03-31 NOTE — DISCHARGE NOTE PROVIDER - HOSPITAL COURSE
42 F hx of recent Tubo ovarian abscess, s/p IR drain 3/5/23 s/p drain removal 3/16/23 d/c home with bactrim 3/18/23, admitted for intractable diarrhea with blood and abd pain. seen by GI- symptoms improved c diff negative, tolerating po diet.   cleared by GI to be d/c now  blood in stool likely due to hemorrhoids  advised Anusol for hemorrhoids, GI NP will send script to pharmacy- [facility] [address], [place]  f/w with dr. Munguia in the office, scheduled for colonoscopy 4/6

## 2023-03-31 NOTE — DISCHARGE NOTE NURSING/CASE MANAGEMENT/SOCIAL WORK - PATIENT PORTAL LINK FT
You can access the FollowMyHealth Patient Portal offered by [facility] by registering at the following website: [email or web address]. By joining [employer]’s FollowMyHealth portal, you will also be able to view your health information using other applications (apps) compatible with our system.

## 2023-03-31 NOTE — PROGRESS NOTE ADULT - PROBLEM SELECTOR PLAN 1
resolved  pt tolerating regular diet.   passing flatus,   continue with diet as tolerated,   pt has colonoscopy on 4/6 out patient  pt is cleared for d/c from a GI perspective

## 2023-03-31 NOTE — H&P ADULT - NSHPREVIEWOFSYSTEMS_GEN_ALL_CORE
CONSTITUTIONAL: No fever, weight loss, or fatigue  EYES: No eye pain, visual disturbances, or discharge  ENMT:  No difficulty hearing, tinnitus, vertigo; No sinus or throat pain  NECK: No pain or stiffness  RESPIRATORY: No cough, wheezing, chills or hemoptysis; No shortness of breath  CARDIOVASCULAR: No chest pain, palpitations, dizziness, or leg swelling  GASTROINTESTINAL: ++LLQ  abdominal  pain. No nausea, vomiting, or hematemesis; +++ diarrhea . No melena +hematochezia but no clots.   GENITOURINARY: No dysuria, frequency, hematuria, or incontinence  NEUROLOGICAL: No headaches, memory loss, loss of strength, numbness, or tremors  SKIN: No itching, burning, rashes, or lesions   LYMPH NODES: No enlarged glands  ENDOCRINE: No heat or cold intolerance; No hair loss  MUSCULOSKELETAL: No joint pain or swelling; No muscle, back, or extremity pain  PSYCHIATRIC: No depression, anxiety, mood swings, or difficulty sleeping  HEME/LYMPH: No easy bruising, or bleeding gums  ALLERY AND IMMUNOLOGIC: No hives or eczema    IMPROVE VTE Individual Risk Assessment          RISK                                                          Points  [  ] Previous VTE                                                3  [  ] Thrombophilia                                             2  [  ] Lower limb paralysis                                   2        (unable to hold up >15 seconds)    [  ] Current Cancer                                             2         (within 6 months)  [  ] Immobilization > 24 hrs                              1  [  ] ICU/CCU stay > 24 hours                             1  [  ] Age > 60                                                         1    IMPROVE VTE Score:         [         ]    Total Risk Factor Score:    0 - 1:   Consider IPC  >2 - 3:  Thromboprophylaxis required (enoxaparin or SQ heparin)        >4:   High Risk: Thromboprophylaxis required (enoxaparin or SQ heparin), optional add IPC  **If CONTRAINDICATION to enoxaparin or SQ heparin, USE IPCs**

## 2023-03-31 NOTE — H&P ADULT - HISTORY OF PRESENT ILLNESS
42 F hx of recent TOA s/p IR drain 3/5/23 s/p drain removal 3/16/23 completed outpt Bactrim course 3/18/23 pw intractable diarrhea and abd pain. Pt related diarrhea commenced within the first 2 days of antibx use typical with 2-3 BMs in the AM and another 2-3 later in the day. Diarrhea associated with significant LLQ discomfort partially relieved with ibuprofen. Appetite generally not affected. Today with 12-13 episodes of diarrhea within 3 hr period associated with severe LLQ pain and came to ED. Denied any fevers, chills, NV, dysuria, flank pain. Today with some bloody diarrhea and mucous.  In ED, afebrile P: 84 BP: 109/74 sat 94%  WBC: 8.7 58% N K: 3.6 cr: 0.62 CO2: 29 UA neg.

## 2023-03-31 NOTE — PROGRESS NOTE ADULT - SUBJECTIVE AND OBJECTIVE BOX
42 F hx of recent Tubo ovarian abscess, s/p IR drain 3/5/23 s/p drain removal 3/16/23 d/c home with bactrim 3/18/23, now admitted for intractable diarrhea with blood and abd pain.  seen at the bedside, c/o loose stool x 3 since this am, with half teaspoon blood sicne this am, associated with LLQ pain, constant pain. no n/v. no sob, no dysuria.     Vital Signs Last 24 Hrs  T(C): 36.9 (30 Mar 2023 23:30), Max: 37.7 (30 Mar 2023 22:48)  T(F): 98.4 (30 Mar 2023 23:30), Max: 99.9 (30 Mar 2023 22:48)  HR: 75 (30 Mar 2023 23:30) (75 - 84)  BP: 101/67 (30 Mar 2023 23:30) (101/67 - 112/70)  BP(mean): --  RR: 16 (30 Mar 2023 23:30) (16 - 18)  SpO2: 98% (30 Mar 2023 23:30) (98% - 98%)    Parameters below as of 30 Mar 2023 22:48  Patient On (Oxygen Delivery Method): room air     GENERAL- NAD  EAR/NOSE/MOUTH/THROAT - no pharyngeal exudates, no oral leisions,  MMM  EYES- LARON, conjunctiva and Sclera clear  NECK- supple  RESPIRATORY-  clear to auscultation bilaterally, non laboured breathing  CARDIOVASCULAR - SIS2, RRR  GI - soft LLQ tenderness+ BS present  EXTREMITIES- no pedal edema  NEUROLOGY- no gross focal deficits  SKIN- no rashes, warm to touch  PSYCHIATRY- AAO X 3  MUSCULOSKELETAL- ROM normal                8.0                  143  | 25   | 14           7.94  >-----------< 346     ------------------------< 85                    25.6                 4.2  | 109  | 0.64                                         Ca 8.5   Mg 1.8   Ph x      Urinalysis Basic - ( 30 Mar 2023 18:30 )    Color: Yellow / Appearance: Clear / S.020 / pH: x  Gluc: x / Ketone: Negative  / Bili: Negative / Urobili: Negative   Blood: x / Protein: 15 / Nitrite: Negative   Leuk Esterase: Negative / RBC: 0-4 /HPF / WBC 0-2 /HPF   Sq Epi: x / Non Sq Epi: Moderate / Bacteria: Few /HPF      Labs reviewed:     < from: CT Abdomen and Pelvis w/ IV Cont (23 @ 19:19) >  IMPRESSION:  Multiple cystic structures in the left adnexa likely representing   improving tubo-ovarian abscesses.    < end of copied text >      ECG reviewed and interpreted: < from: 12 Lead ECG (23 @ 22:48) >  Ventricular Rate 66 BPM    Atrial Rate 66 BPM    P-R Interval 146 ms    QRS Duration 88 ms    Q-T Interval 376 ms    QTC Calculation(Bazett) 394 ms    P Axis 23 degrees    R Axis 57 degrees    T Axis 35 degrees    Diagnosis Line Normal sinus rhythm  Normal ECG  No previous ECGs available    < end of copied text >      MEDICATIONS  (STANDING):  lactobacillus acidophilus 1 Tablet(s) Oral two times a day with meals  sodium chloride 0.9%. 1000 milliLiter(s) (70 mL/Hr) IV Continuous <Continuous>    MEDICATIONS  (PRN):  acetaminophen     Tablet .. 650 milliGRAM(s) Oral every 6 hours PRN Temp greater or equal to 38C (100.4F), Mild Pain (1 - 3)

## 2023-03-31 NOTE — PROGRESS NOTE ADULT - SUBJECTIVE AND OBJECTIVE BOX
[facility] GI Progress Note    Patient is a 42y old  Female who presents with a chief complaint of diarrhea, abd pain. (31 Mar 2023 09:05)    SUBJECTIVE/OVERNIGHT  Pt seen and examined at bedside. No acute overnight events. Denies fever, chill, chest pain, shortness of breath, abdominal or epigastric pain, nausea, vomiting, hematemesis, diarrhea, constipation, melena, or hematochezia. Tolerating diet    Allergies-> oxycodone (Pruritus)    MEDICATIONS:  MEDICATIONS  (STANDING):  lactobacillus acidophilus 1 Tablet(s) Oral two times a day with meals  pantoprazole    Tablet 40 milliGRAM(s) Oral daily  sodium chloride 0.9%. 1000 milliLiter(s) (70 mL/Hr) IV Continuous <Continuous>    MEDICATIONS  (PRN):  acetaminophen     Tablet .. 650 milliGRAM(s) Oral every 6 hours PRN Temp greater or equal to 38C (100.4F), Mild Pain (1 - 3)    Vital Signs Last 24 Hrs  T(C): 37 (31 Mar 2023 10:28), Max: 37.7 (30 Mar 2023 22:48)  T(F): 98.6 (31 Mar 2023 10:28), Max: 99.9 (30 Mar 2023 22:48)  HR: 84 (31 Mar 2023 10:28) (75 - 84)  BP: 106/69 (31 Mar 2023 10:28) (101/67 - 112/70)  BP(mean): 81 (31 Mar 2023 10:28) (81 - 81)  RR: 18 (31 Mar 2023 10:28) (16 - 18)  SpO2: 98% (31 Mar 2023 10:28) (98% - 98%)    Parameters below as of 31 Mar 2023 10:28  Patient On (Oxygen Delivery Method): room air          PHYSICAL EXAM:    General: Well developed; well nourished; in no acute distress  Neuro: A&O x 3. No asterixis.  Respiratory: No respiratory distress. No intercostal retractions  Cardiac: S1S2 + no acute arrhythmia noted,   Gastrointestinal: Soft, mild tenderness to palpation,  non-distended; Normal bowel sounds; No hepatosplenomegaly. No rebound or guarding  Skin: Warm and dry. No obvious rash, spider angiomas, telangiectasias, palmar erythema  Extremities: No clubbing, cyanosis, or edema    LABS:             8.0    7.94  )-----------( 346      (  @ 05:00 )             25.6                8.5    8.70  )-----------( 417      (  @ 18:25 )             27.5           143  |  109<H>  |  14  ----------------------------<  85  4.2   |  25  |  0.64    Ca    8.5      31 Mar 2023 05:00  Mg     1.8         TPro  6.5  /  Alb  3.1<L>  /  TBili  0.3  /  DBili  x   /  AST  12  /  ALT  21  /  AlkPhos  81        Urinalysis Basic - ( 30 Mar 2023 18:30 )    Color: Yellow / Appearance: Clear / S.020 / pH: x  Gluc: x / Ketone: Negative  / Bili: Negative / Urobili: Negative   Blood: x / Protein: 15 / Nitrite: Negative   Leuk Esterase: Negative / RBC: 0-4 /HPF / WBC 0-2 /HPF   Sq Epi: x / Non Sq Epi: Moderate / Bacteria: Few /HPF       [facility] GI Progress Note    Patient is a 42y old  Female who presents with a chief complaint of diarrhea, abd pain. (31 Mar 2023 09:05)    SUBJECTIVE/OVERNIGHT  Pt seen and examined at bedside. No acute overnight events. Denies fever, chill, chest pain, shortness of breath, abdominal or epigastric pain, nausea, vomiting, hematemesis, diarrhea, constipation, melena, or hematochezia. Tolerating diet    Allergies-> oxycodone (Pruritus)    MEDICATIONS:  MEDICATIONS  (STANDING):  lactobacillus acidophilus 1 Tablet(s) Oral two times a day with meals  pantoprazole    Tablet 40 milliGRAM(s) Oral daily  sodium chloride 0.9%. 1000 milliLiter(s) (70 mL/Hr) IV Continuous <Continuous>    MEDICATIONS  (PRN):  acetaminophen     Tablet .. 650 milliGRAM(s) Oral every 6 hours PRN Temp greater or equal to 38C (100.4F), Mild Pain (1 - 3)    Vital Signs Last 24 Hrs  T(C): 37 (31 Mar 2023 10:28), Max: 37.7 (30 Mar 2023 22:48)  T(F): 98.6 (31 Mar 2023 10:28), Max: 99.9 (30 Mar 2023 22:48)  HR: 84 (31 Mar 2023 10:28) (75 - 84)  BP: 106/69 (31 Mar 2023 10:28) (101/67 - 112/70)  BP(mean): 81 (31 Mar 2023 10:28) (81 - 81)  RR: 18 (31 Mar 2023 10:28) (16 - 18)  SpO2: 98% (31 Mar 2023 10:28) (98% - 98%)    Parameters below as of 31 Mar 2023 10:28  Patient On (Oxygen Delivery Method): room air          PHYSICAL EXAM:    General: Well developed; well nourished; in no acute distress  Neuro: A&O x 3. No asterixis.  Respiratory: No respiratory distress. No intercostal retractions  Cardiac: S1S2 + no acute arrhythmia noted,   Gastrointestinal: Soft, mild tenderness to palpation,  non-distended; Normal bowel sounds; No hepatosplenomegaly. No rebound or guarding  Rectal: large external hemorrhoids noted,  mild edema noted  Skin: Warm and dry. No obvious rash, spider angiomas, telangiectasias, palmar erythema  Extremities: No clubbing, cyanosis, or edema    LABS:             8.0    7.94  )-----------( 346      (  @ 05:00 )             25.6                8.5    8.70  )-----------( 417      (  @ 18:25 )             27.5           143  |  109<H>  |  14  ----------------------------<  85  4.2   |  25  |  0.64    Ca    8.5      31 Mar 2023 05:00  Mg     1.8         TPro  6.5  /  Alb  3.1<L>  /  TBili  0.3  /  DBili  x   /  AST  12  /  ALT  21  /  AlkPhos  81        Urinalysis Basic - ( 30 Mar 2023 18:30 )    Color: Yellow / Appearance: Clear / S.020 / pH: x  Gluc: x / Ketone: Negative  / Bili: Negative / Urobili: Negative   Blood: x / Protein: 15 / Nitrite: Negative   Leuk Esterase: Negative / RBC: 0-4 /HPF / WBC 0-2 /HPF   Sq Epi: x / Non Sq Epi: Moderate / Bacteria: Few /HPF

## 2023-04-02 LAB
CULTURE RESULTS: SIGNIFICANT CHANGE UP
SPECIMEN SOURCE: SIGNIFICANT CHANGE UP

## 2023-04-05 DIAGNOSIS — B37.0 CANDIDAL STOMATITIS: ICD-10-CM

## 2023-04-06 ENCOUNTER — RESULT REVIEW (OUTPATIENT)
Age: 43
End: 2023-04-06

## 2023-04-06 ENCOUNTER — APPOINTMENT (OUTPATIENT)
Dept: GASTROENTEROLOGY | Facility: HOSPITAL | Age: 43
End: 2023-04-06

## 2023-04-06 ENCOUNTER — OUTPATIENT (OUTPATIENT)
Dept: OUTPATIENT SERVICES | Facility: HOSPITAL | Age: 43
LOS: 1 days | End: 2023-04-06
Payer: COMMERCIAL

## 2023-04-06 DIAGNOSIS — K60.2 ANAL FISSURE, UNSPECIFIED: ICD-10-CM

## 2023-04-06 DIAGNOSIS — N70.93 SALPINGITIS AND OOPHORITIS, UNSPECIFIED: Chronic | ICD-10-CM

## 2023-04-06 DIAGNOSIS — K92.1 MELENA: ICD-10-CM

## 2023-04-06 PROCEDURE — 45381 COLONOSCOPY SUBMUCOUS NJX: CPT | Mod: 74

## 2023-04-06 PROCEDURE — 45380 COLONOSCOPY AND BIOPSY: CPT | Mod: 74

## 2023-04-06 PROCEDURE — 88305 TISSUE EXAM BY PATHOLOGIST: CPT | Mod: 26

## 2023-04-06 PROCEDURE — 45380 COLONOSCOPY AND BIOPSY: CPT | Mod: 53

## 2023-04-06 PROCEDURE — 88305 TISSUE EXAM BY PATHOLOGIST: CPT

## 2023-04-06 RX ORDER — SODIUM CHLORIDE 9 MG/ML
500 INJECTION INTRAMUSCULAR; INTRAVENOUS; SUBCUTANEOUS
Refills: 0 | Status: COMPLETED | OUTPATIENT
Start: 2023-04-06 | End: 2023-04-06

## 2023-04-06 RX ADMIN — SODIUM CHLORIDE 75 MILLILITER(S): 9 INJECTION INTRAMUSCULAR; INTRAVENOUS; SUBCUTANEOUS at 12:14

## 2023-04-07 ENCOUNTER — APPOINTMENT (OUTPATIENT)
Dept: SURGERY | Facility: CLINIC | Age: 43
End: 2023-04-07
Payer: COMMERCIAL

## 2023-04-07 VITALS — BODY MASS INDEX: 17.72 KG/M2 | TEMPERATURE: 98 F | HEIGHT: 63 IN | WEIGHT: 100 LBS

## 2023-04-07 DIAGNOSIS — I49.1 ATRIAL PREMATURE DEPOLARIZATION: ICD-10-CM

## 2023-04-07 PROBLEM — K64.4 RESIDUAL HEMORRHOIDAL SKIN TAGS: Chronic | Status: ACTIVE | Noted: 2023-03-31

## 2023-04-07 LAB
HCT VFR BLD CALC: 27.7 %
HGB BLD-MCNC: 8.5 G/DL
INR PPP: 1.18 RATIO
MCHC RBC-ENTMCNC: 22.3 PG
MCHC RBC-ENTMCNC: 30.7 GM/DL
MCV RBC AUTO: 72.7 FL
PLATELET # BLD AUTO: 429 K/UL
PT BLD: 14 SEC
RBC # BLD: 3.81 M/UL
RBC # FLD: 17.8 %
SURGICAL PATHOLOGY STUDY: SIGNIFICANT CHANGE UP
WBC # FLD AUTO: 9.14 K/UL

## 2023-04-07 PROCEDURE — 99203 OFFICE O/P NEW LOW 30 MIN: CPT

## 2023-04-07 PROCEDURE — 99213 OFFICE O/P EST LOW 20 MIN: CPT

## 2023-04-07 NOTE — HISTORY OF PRESENT ILLNESS
[de-identified] : This 42 year old woman has been passing BRBPR for the past four months. She also has noted increasing difficulty passing stool with abdominal cramps.Yesterday, colonoscopy by Dr. Munguia revealed an obstructing, friable  mass at 30 cm. Biopsies demonstrated an adenoma with atypia. There is no FH of colon CA. The patient was recently hospitalized for laparoscopic drainage of a tubo-ovarian abscess.

## 2023-04-07 NOTE — PHYSICAL EXAM
[Normal Breath Sounds] : Normal breath sounds [Normal Heart Sounds] : normal heart sounds [Normal Rate and Rhythm] : normal rate and rhythm [Abdominal Masses] : No abdominal masses [Abdomen Tenderness] : ~T ~M No abdominal tenderness [No Rash or Lesion] : No rash or lesion [de-identified] : nl [de-identified] : nl [de-identified] : nl

## 2023-04-07 NOTE — ASSESSMENT
[FreeTextEntry1] : Long discussion regarding all options and risks\par To undergo a left colectomy on 4/20/23\par All lab values and imaging studies reviewed\par Discussed with Medicine

## 2023-04-10 ENCOUNTER — APPOINTMENT (OUTPATIENT)
Dept: OBGYN | Facility: CLINIC | Age: 43
End: 2023-04-10
Payer: COMMERCIAL

## 2023-04-10 ENCOUNTER — APPOINTMENT (OUTPATIENT)
Dept: SURGERY | Facility: CLINIC | Age: 43
End: 2023-04-10
Payer: COMMERCIAL

## 2023-04-10 VITALS — WEIGHT: 100 LBS | TEMPERATURE: 98.2 F | BODY MASS INDEX: 17.72 KG/M2 | HEIGHT: 63 IN

## 2023-04-10 VITALS
TEMPERATURE: 98.2 F | HEIGHT: 63 IN | BODY MASS INDEX: 17.72 KG/M2 | DIASTOLIC BLOOD PRESSURE: 70 MMHG | SYSTOLIC BLOOD PRESSURE: 110 MMHG | WEIGHT: 100 LBS | OXYGEN SATURATION: 99 % | HEART RATE: 101 BPM

## 2023-04-10 DIAGNOSIS — I34.1 NONRHEUMATIC MITRAL (VALVE) PROLAPSE: ICD-10-CM

## 2023-04-10 DIAGNOSIS — N70.93 SALPINGITIS AND OOPHORITIS, UNSPECIFIED: ICD-10-CM

## 2023-04-10 DIAGNOSIS — R18.8 OTHER ASCITES: ICD-10-CM

## 2023-04-10 DIAGNOSIS — Z01.419 ENCOUNTER FOR GYNECOLOGICAL EXAMINATION (GENERAL) (ROUTINE) W/OUT ABNORMAL FINDINGS: ICD-10-CM

## 2023-04-10 DIAGNOSIS — B37.0 CANDIDAL STOMATITIS: ICD-10-CM

## 2023-04-10 DIAGNOSIS — K92.1 MELENA: ICD-10-CM

## 2023-04-10 LAB — LACTOFERRIN STL-MCNC: <1 CD:794062635 — SIGNIFICANT CHANGE UP (ref 0–7.24)

## 2023-04-10 PROCEDURE — 99215 OFFICE O/P EST HI 40 MIN: CPT

## 2023-04-10 PROCEDURE — 99214 OFFICE O/P EST MOD 30 MIN: CPT

## 2023-04-10 NOTE — ASSESSMENT
[FreeTextEntry1] : Long discussion regarding all options and risks\par I explained that after reviewing her records from [facility], I am not certain that she had developed a TOA, and that her abscess could be related to the partially obstructing mass in the sigmoid colon.  As such, the possibility of a temporary colostomy is "real". \par I discussed the possibility of blood transfusions at the start of the surgery rather than preoperatively. \par Bowel prep discussed\par To consult with Dr. Massey and Urology (catheters)

## 2023-04-10 NOTE — HISTORY OF PRESENT ILLNESS
[de-identified] : The patient is tolerating a low fiber diet and is passing loose stool. Intermittently, she experiences crampy abdominal discomfort. The patient denies nausea, vomiting, fever or chills.

## 2023-04-10 NOTE — PHYSICAL EXAM
[Chaperone Present] : A chaperone was present in the examining room during all aspects of the physical examination [FreeTextEntry1] : KATIA Bardales [Appropriately responsive] : appropriately responsive [Alert] : alert [No Acute Distress] : no acute distress [No Lymphadenopathy] : no lymphadenopathy [No Murmurs] : no murmurs [Non-tender] : non-tender [Non-distended] : non-distended [No HSM] : No HSM [No Lesions] : no lesions [No Mass] : no mass [Oriented x3] : oriented x3 [FreeTextEntry7] : Persistent LLQ pain 3/10 [Examination Of The Breasts] : a normal appearance [Breast Palpation Diffuse Fibrous Tissue Bilateral] : fibrocystic changes [No Masses] : no breast masses were palpable [Labia Majora] : normal [Labia Minora] : normal [Moderate] : There was moderate vaginal bleeding [Soft] : soft [Normal] : normal [Normal Position] : in a normal position [Tenderness] : nontender [Enlarged ___ wks] : not enlarged [Mass ___ cm] : no uterine mass was palpated [Uterine Adnexae] : normal [Adnexa Tenderness On The Left] : tender

## 2023-04-10 NOTE — REVIEW OF SYSTEMS
[Diarrhea] : diarrhea [Negative] : Eyes [Heart Rate Is Slow] : the heart rate was not slow [Chest Pain] : no chest pain [Shortness Of Breath] : no shortness of breath [Abdominal Pain] : no abdominal pain

## 2023-04-10 NOTE — HISTORY OF PRESENT ILLNESS
[FreeTextEntry1] : F/U S/P IR drainage of Lt TOA  on 3/5/23 at [facility]  S/P colonoscopy 4/6/23 with Dr Munguia + for sigmoid polyp Path + for atypia  S/P consult with Dr Smith for possible sigmoid colon resection\par \par Less LLQ since using Miralax  Persistent BRBPR and frequent BM's\par \par Persistent anemia, not related to menses\par \par Persistent oral thrush Nystatin not helping [Withdrawal] : uses withdrawal [Y] : Patient is sexually active [Monogamous (Male Partner)] : is monogamous with a male partner [LMPDate] : 4/8/23 [PGHxTotal] : 0

## 2023-04-10 NOTE — PHYSICAL EXAM
[Normal Breath Sounds] : Normal breath sounds [Normal Heart Sounds] : normal heart sounds [Normal Rate and Rhythm] : normal rate and rhythm [No Rash or Lesion] : No rash or lesion [Abdominal Masses] : No abdominal masses [Abdomen Tenderness] : ~T ~M No abdominal tenderness [de-identified] : nl [de-identified] : nl [de-identified] : nl

## 2023-04-11 LAB
ALBUMIN SERPL ELPH-MCNC: 4.6 G/DL
ALP BLD-CCNC: 96 U/L
ALT SERPL-CCNC: 13 U/L
AST SERPL-CCNC: 13 U/L
BILIRUB DIRECT SERPL-MCNC: 0.2 MG/DL
BILIRUB INDIRECT SERPL-MCNC: 0.3 MG/DL
BILIRUB SERPL-MCNC: 0.5 MG/DL
CEA SERPL-MCNC: <0.6 NG/ML
CREAT SERPL-MCNC: 0.53 MG/DL
EGFR: 119 ML/MIN/1.73M2
HPV HIGH+LOW RISK DNA PNL CVX: NOT DETECTED
PROT SERPL-MCNC: 7.3 G/DL

## 2023-04-12 ENCOUNTER — OUTPATIENT (OUTPATIENT)
Dept: OUTPATIENT SERVICES | Facility: HOSPITAL | Age: 43
LOS: 1 days | End: 2023-04-12
Payer: COMMERCIAL

## 2023-04-12 VITALS
HEIGHT: 63 IN | DIASTOLIC BLOOD PRESSURE: 75 MMHG | WEIGHT: 100.53 LBS | RESPIRATION RATE: 18 BRPM | HEART RATE: 98 BPM | TEMPERATURE: 99 F | OXYGEN SATURATION: 98 % | SYSTOLIC BLOOD PRESSURE: 110 MMHG

## 2023-04-12 DIAGNOSIS — Z86.010 PERSONAL HISTORY OF COLONIC POLYPS: ICD-10-CM

## 2023-04-12 DIAGNOSIS — Z98.890 OTHER SPECIFIED POSTPROCEDURAL STATES: Chronic | ICD-10-CM

## 2023-04-12 DIAGNOSIS — Z01.818 ENCOUNTER FOR OTHER PREPROCEDURAL EXAMINATION: ICD-10-CM

## 2023-04-12 DIAGNOSIS — N70.93 SALPINGITIS AND OOPHORITIS, UNSPECIFIED: Chronic | ICD-10-CM

## 2023-04-12 LAB
APTT BLD: 42.2 SEC — HIGH (ref 27.5–35.5)
BLD GP AB SCN SERPL QL: SIGNIFICANT CHANGE UP
HCT VFR BLD CALC: 27 % — LOW (ref 34.5–45)
HGB BLD-MCNC: 8.3 G/DL — LOW (ref 11.5–15.5)
INR BLD: 1.2 RATIO — HIGH (ref 0.88–1.16)
MCHC RBC-ENTMCNC: 22.3 PG — LOW (ref 27–34)
MCHC RBC-ENTMCNC: 30.7 GM/DL — LOW (ref 32–36)
MCV RBC AUTO: 72.4 FL — LOW (ref 80–100)
NRBC # BLD: 0 /100 WBCS — SIGNIFICANT CHANGE UP (ref 0–0)
PLATELET # BLD AUTO: 454 K/UL — HIGH (ref 150–400)
PROTHROM AB SERPL-ACNC: 13.9 SEC — HIGH (ref 10.5–13.4)
RBC # BLD: 3.73 M/UL — LOW (ref 3.8–5.2)
RBC # FLD: 17.2 % — HIGH (ref 10.3–14.5)
WBC # BLD: 8.31 K/UL — SIGNIFICANT CHANGE UP (ref 3.8–10.5)
WBC # FLD AUTO: 8.31 K/UL — SIGNIFICANT CHANGE UP (ref 3.8–10.5)

## 2023-04-12 NOTE — H&P PST ADULT - NSANTHOSAYNRD_GEN_A_CORE
neck 11.5 inches/No. IVY screening performed.  STOP BANG Legend: 0-2 = LOW Risk; 3-4 = INTERMEDIATE Risk; 5-8 = HIGH Risk

## 2023-04-12 NOTE — H&P PST ADULT - NEGATIVE ENMT SYMPTOMS
no hearing difficulty/no ear pain/no nasal discharge/no nasal obstruction/no post-nasal discharge/no nose bleeds/no throat pain/no dysphagia

## 2023-04-12 NOTE — H&P PST ADULT - HISTORY OF PRESENT ILLNESS
43 y/o female accompanied by sister presents for PST.  Patient was hospitalized 03/03/2023-03/09/2023 for TOA - required IV antibiotics and completed therapy at home.  After completion of Abx patient developed vaginal yeast infection and BV- treatment also has been completed.   Patient also aware of her current anemia,  did not received a transfusion ever and is now aware that there is a high possibility she will need a transfusion with during this procedure.  Still with some mild intermittent LLQ abdominal pain managed with Tylenol.   Coloscopy done after hospitalization and patient now dx with colonic polyp and requires upcoming procedure.  Scheduled for left colectomy, possible colostomy, possible ileostomy, laparoscopy, laparotomy with Dr Smith on 04/20/2023.  COVID-19 pcr testing information provided.

## 2023-04-12 NOTE — H&P PST ADULT - REASON FOR ADMISSION
left colectomy, possible colostomy, possible ileostomy, laparopscopy left colectomy, possible colostomy, possible ileostomy, laparoscopy

## 2023-04-12 NOTE — H&P PST ADULT - GENITOURINARY COMMENTS
was treated from BV and yeast infection post IV abx use -  completed greater than 2 weeks ago with resolution of symptoms not examined

## 2023-04-12 NOTE — H&P PST ADULT - PROBLEM SELECTOR PLAN 1
Scheduled for left colectomy, possible colostomy, possible ileostomy, laparoscopy, laparotomy with Dr Smith on 04/20/2023.  COVID-19 pcr testing information provided. CBC, CMP, EKG, chest x ray on chart.  repeat CBC, HgbA1C, T&S PT/PTT/INR pending.  NPO after midnight night before procedure.  ERP protocol reviewed by nutritionist.  TO stop all ASA, NSAIDs, vitamins and supplements 1 week prior to procedure.  Chlorhexidene wash given with instruction.  UCG pre op.

## 2023-04-12 NOTE — H&P PST ADULT - NSICDXPASTMEDICALHX_GEN_ALL_CORE_FT
PAST MEDICAL HISTORY:  Colonic polyp     External hemorrhoid, bleeding     External hemorrhoids     TOA (tubo-ovarian abscess)

## 2023-04-13 LAB
A1C WITH ESTIMATED AVERAGE GLUCOSE RESULT: 5.2 % — SIGNIFICANT CHANGE UP (ref 4–5.6)
BACTERIA GENITAL AEROBE CULT: NORMAL
CYTOLOGY CVX/VAG DOC THIN PREP: NORMAL
ESTIMATED AVERAGE GLUCOSE: 103 MG/DL — SIGNIFICANT CHANGE UP (ref 68–114)

## 2023-04-17 LAB
A VAGINAE DNA VAG QL NAA+PROBE: NORMAL
BVAB2 DNA VAG QL NAA+PROBE: NORMAL
C KRUSEI DNA VAG QL NAA+PROBE: NEGATIVE
MEGA1 DNA VAG QL NAA+PROBE: NORMAL
SARS-COV-2 N GENE NPH QL NAA+PROBE: NOT DETECTED
T VAGINALIS RRNA SPEC QL NAA+PROBE: NEGATIVE

## 2023-04-19 ENCOUNTER — TRANSCRIPTION ENCOUNTER (OUTPATIENT)
Age: 43
End: 2023-04-19

## 2023-04-19 NOTE — PATIENT PROFILE ADULT - FALL HARM RISK - UNIVERSAL INTERVENTIONS
Bed in lowest position, wheels locked, appropriate side rails in place/Call bell, personal items and telephone in reach/Instruct patient to call for assistance before getting out of bed or chair/Non-slip footwear when patient is out of bed/[place] to call system/Physically safe environment - no spills, clutter or unnecessary equipment/Purposeful Proactive Rounding/Room/bathroom lighting operational, light cord in reach

## 2023-04-20 ENCOUNTER — TRANSCRIPTION ENCOUNTER (OUTPATIENT)
Age: 43
End: 2023-04-20

## 2023-04-20 ENCOUNTER — RESULT REVIEW (OUTPATIENT)
Age: 43
End: 2023-04-20

## 2023-04-20 ENCOUNTER — APPOINTMENT (OUTPATIENT)
Dept: SURGERY | Facility: HOSPITAL | Age: 43
End: 2023-04-20

## 2023-04-20 ENCOUNTER — INPATIENT (INPATIENT)
Facility: HOSPITAL | Age: 43
LOS: 5 days | Discharge: HOME | DRG: 330 | End: 2023-04-26
Attending: SURGERY | Admitting: SURGERY
Payer: COMMERCIAL

## 2023-04-20 VITALS
OXYGEN SATURATION: 98 % | SYSTOLIC BLOOD PRESSURE: 105 MMHG | RESPIRATION RATE: 13 BRPM | DIASTOLIC BLOOD PRESSURE: 72 MMHG | HEIGHT: 63 IN | HEART RATE: 99 BPM | WEIGHT: 100.53 LBS | TEMPERATURE: 98 F

## 2023-04-20 DIAGNOSIS — Z86.010 PERSONAL HISTORY OF COLONIC POLYPS: ICD-10-CM

## 2023-04-20 DIAGNOSIS — N70.93 SALPINGITIS AND OOPHORITIS, UNSPECIFIED: Chronic | ICD-10-CM

## 2023-04-20 DIAGNOSIS — Z98.890 OTHER SPECIFIED POSTPROCEDURAL STATES: Chronic | ICD-10-CM

## 2023-04-20 LAB
ANION GAP SERPL CALC-SCNC: 11 MMOL/L — SIGNIFICANT CHANGE UP (ref 5–17)
BUN SERPL-MCNC: 9 MG/DL — SIGNIFICANT CHANGE UP (ref 7–23)
CALCIUM SERPL-MCNC: 8.8 MG/DL — SIGNIFICANT CHANGE UP (ref 8.4–10.5)
CHLORIDE SERPL-SCNC: 103 MMOL/L — SIGNIFICANT CHANGE UP (ref 96–108)
CO2 SERPL-SCNC: 26 MMOL/L — SIGNIFICANT CHANGE UP (ref 22–31)
CREAT SERPL-MCNC: 0.69 MG/DL — SIGNIFICANT CHANGE UP (ref 0.5–1.3)
EGFR: 112 ML/MIN/1.73M2 — SIGNIFICANT CHANGE UP
GLUCOSE BLDC GLUCOMTR-MCNC: 141 MG/DL — HIGH (ref 70–99)
GLUCOSE BLDC GLUCOMTR-MCNC: 159 MG/DL — HIGH (ref 70–99)
GLUCOSE SERPL-MCNC: 144 MG/DL — HIGH (ref 70–99)
HCT VFR BLD CALC: 25.2 % — LOW (ref 34.5–45)
HGB BLD-MCNC: 7.7 G/DL — LOW (ref 11.5–15.5)
MAGNESIUM SERPL-MCNC: 1.5 MG/DL — LOW (ref 1.6–2.6)
MCHC RBC-ENTMCNC: 22.3 PG — LOW (ref 27–34)
MCHC RBC-ENTMCNC: 30.6 GM/DL — LOW (ref 32–36)
MCV RBC AUTO: 72.8 FL — LOW (ref 80–100)
NRBC # BLD: 0 /100 WBCS — SIGNIFICANT CHANGE UP (ref 0–0)
PHOSPHATE SERPL-MCNC: 3.3 MG/DL — SIGNIFICANT CHANGE UP (ref 2.5–4.5)
PLATELET # BLD AUTO: 383 K/UL — SIGNIFICANT CHANGE UP (ref 150–400)
POTASSIUM SERPL-MCNC: 3.6 MMOL/L — SIGNIFICANT CHANGE UP (ref 3.5–5.3)
POTASSIUM SERPL-SCNC: 3.6 MMOL/L — SIGNIFICANT CHANGE UP (ref 3.5–5.3)
RBC # BLD: 3.46 M/UL — LOW (ref 3.8–5.2)
RBC # FLD: 16.7 % — HIGH (ref 10.3–14.5)
SODIUM SERPL-SCNC: 140 MMOL/L — SIGNIFICANT CHANGE UP (ref 135–145)
WBC # BLD: 13.68 K/UL — HIGH (ref 3.8–10.5)
WBC # FLD AUTO: 13.68 K/UL — HIGH (ref 3.8–10.5)

## 2023-04-20 PROCEDURE — 88305 TISSUE EXAM BY PATHOLOGIST: CPT | Mod: 26

## 2023-04-20 PROCEDURE — ZZZZZ: CPT

## 2023-04-20 PROCEDURE — 58720 REMOVAL OF OVARY/TUBE(S): CPT

## 2023-04-20 PROCEDURE — 88342 IMHCHEM/IMCYTCHM 1ST ANTB: CPT | Mod: 26

## 2023-04-20 PROCEDURE — 88307 TISSUE EXAM BY PATHOLOGIST: CPT | Mod: 26

## 2023-04-20 PROCEDURE — 44140 PARTIAL REMOVAL OF COLON: CPT

## 2023-04-20 PROCEDURE — 88341 IMHCHEM/IMCYTCHM EA ADD ANTB: CPT | Mod: 26

## 2023-04-20 PROCEDURE — 88309 TISSUE EXAM BY PATHOLOGIST: CPT | Mod: 26

## 2023-04-20 DEVICE — URETERAL CATH OLIVE TIP 5FR: Type: IMPLANTABLE DEVICE | Site: LEFT | Status: FUNCTIONAL

## 2023-04-20 RX ORDER — ONDANSETRON 8 MG/1
4 TABLET, FILM COATED ORAL EVERY 6 HOURS
Refills: 0 | Status: DISCONTINUED | OUTPATIENT
Start: 2023-04-20 | End: 2023-04-20

## 2023-04-20 RX ORDER — HYDROMORPHONE HYDROCHLORIDE 2 MG/ML
0.5 INJECTION INTRAMUSCULAR; INTRAVENOUS; SUBCUTANEOUS
Refills: 0 | Status: DISCONTINUED | OUTPATIENT
Start: 2023-04-20 | End: 2023-04-20

## 2023-04-20 RX ORDER — ACETAMINOPHEN 500 MG
1000 TABLET ORAL EVERY 6 HOURS
Refills: 0 | Status: COMPLETED | OUTPATIENT
Start: 2023-04-20 | End: 2023-04-21

## 2023-04-20 RX ORDER — KETOROLAC TROMETHAMINE 30 MG/ML
15 SYRINGE (ML) INJECTION EVERY 6 HOURS
Refills: 0 | Status: DISCONTINUED | OUTPATIENT
Start: 2023-04-20 | End: 2023-04-21

## 2023-04-20 RX ORDER — KETOROLAC TROMETHAMINE 30 MG/ML
30 SYRINGE (ML) INJECTION ONCE
Refills: 0 | Status: DISCONTINUED | OUTPATIENT
Start: 2023-04-20 | End: 2023-04-20

## 2023-04-20 RX ORDER — CEFOTETAN DISODIUM 1 G
2 VIAL (EA) INJECTION EVERY 12 HOURS
Refills: 0 | Status: COMPLETED | OUTPATIENT
Start: 2023-04-20 | End: 2023-04-20

## 2023-04-20 RX ORDER — HYDROMORPHONE HYDROCHLORIDE 2 MG/ML
0.5 INJECTION INTRAMUSCULAR; INTRAVENOUS; SUBCUTANEOUS ONCE
Refills: 0 | Status: DISCONTINUED | OUTPATIENT
Start: 2023-04-20 | End: 2023-04-20

## 2023-04-20 RX ORDER — ONDANSETRON 8 MG/1
4 TABLET, FILM COATED ORAL EVERY 6 HOURS
Refills: 0 | Status: DISCONTINUED | OUTPATIENT
Start: 2023-04-20 | End: 2023-04-23

## 2023-04-20 RX ORDER — ONDANSETRON 8 MG/1
4 TABLET, FILM COATED ORAL ONCE
Refills: 0 | Status: COMPLETED | OUTPATIENT
Start: 2023-04-20 | End: 2023-04-20

## 2023-04-20 RX ORDER — DIAZEPAM 5 MG
2 TABLET ORAL EVERY 6 HOURS
Refills: 0 | Status: DISCONTINUED | OUTPATIENT
Start: 2023-04-20 | End: 2023-04-26

## 2023-04-20 RX ORDER — SODIUM CHLORIDE 9 MG/ML
500 INJECTION INTRAMUSCULAR; INTRAVENOUS; SUBCUTANEOUS ONCE
Refills: 0 | Status: DISCONTINUED | OUTPATIENT
Start: 2023-04-20 | End: 2023-04-26

## 2023-04-20 RX ORDER — ENOXAPARIN SODIUM 100 MG/ML
40 INJECTION SUBCUTANEOUS EVERY 24 HOURS
Refills: 0 | Status: DISCONTINUED | OUTPATIENT
Start: 2023-04-21 | End: 2023-04-26

## 2023-04-20 RX ORDER — SODIUM CHLORIDE 9 MG/ML
1000 INJECTION, SOLUTION INTRAVENOUS
Refills: 0 | Status: DISCONTINUED | OUTPATIENT
Start: 2023-04-20 | End: 2023-04-20

## 2023-04-20 RX ORDER — SODIUM CHLORIDE 9 MG/ML
1000 INJECTION, SOLUTION INTRAVENOUS
Refills: 0 | Status: DISCONTINUED | OUTPATIENT
Start: 2023-04-20 | End: 2023-04-22

## 2023-04-20 RX ORDER — LACTOBACILLUS ACIDOPHILUS 100MM CELL
1 CAPSULE ORAL
Refills: 0 | Status: DISCONTINUED | OUTPATIENT
Start: 2023-04-20 | End: 2023-04-26

## 2023-04-20 RX ORDER — GABAPENTIN 400 MG/1
600 CAPSULE ORAL ONCE
Refills: 0 | Status: COMPLETED | OUTPATIENT
Start: 2023-04-20 | End: 2023-04-20

## 2023-04-20 RX ORDER — HYDROMORPHONE HYDROCHLORIDE 2 MG/ML
2 INJECTION INTRAMUSCULAR; INTRAVENOUS; SUBCUTANEOUS EVERY 4 HOURS
Refills: 0 | Status: DISCONTINUED | OUTPATIENT
Start: 2023-04-20 | End: 2023-04-25

## 2023-04-20 RX ORDER — HYDROMORPHONE HYDROCHLORIDE 2 MG/ML
0.5 INJECTION INTRAMUSCULAR; INTRAVENOUS; SUBCUTANEOUS EVERY 4 HOURS
Refills: 0 | Status: DISCONTINUED | OUTPATIENT
Start: 2023-04-20 | End: 2023-04-26

## 2023-04-20 RX ORDER — CELECOXIB 200 MG/1
400 CAPSULE ORAL ONCE
Refills: 0 | Status: COMPLETED | OUTPATIENT
Start: 2023-04-20 | End: 2023-04-20

## 2023-04-20 RX ADMIN — Medication 100 GRAM(S): at 21:42

## 2023-04-20 RX ADMIN — HYDROMORPHONE HYDROCHLORIDE 0.5 MILLIGRAM(S): 2 INJECTION INTRAMUSCULAR; INTRAVENOUS; SUBCUTANEOUS at 14:05

## 2023-04-20 RX ADMIN — HYDROMORPHONE HYDROCHLORIDE 0.5 MILLIGRAM(S): 2 INJECTION INTRAMUSCULAR; INTRAVENOUS; SUBCUTANEOUS at 17:44

## 2023-04-20 RX ADMIN — Medication 30 MILLIGRAM(S): at 15:10

## 2023-04-20 RX ADMIN — HYDROMORPHONE HYDROCHLORIDE 0.5 MILLIGRAM(S): 2 INJECTION INTRAMUSCULAR; INTRAVENOUS; SUBCUTANEOUS at 20:11

## 2023-04-20 RX ADMIN — HYDROMORPHONE HYDROCHLORIDE 0.5 MILLIGRAM(S): 2 INJECTION INTRAMUSCULAR; INTRAVENOUS; SUBCUTANEOUS at 19:30

## 2023-04-20 RX ADMIN — Medication 400 MILLIGRAM(S): at 16:19

## 2023-04-20 RX ADMIN — SODIUM CHLORIDE 50 MILLILITER(S): 9 INJECTION, SOLUTION INTRAVENOUS at 06:50

## 2023-04-20 RX ADMIN — GABAPENTIN 600 MILLIGRAM(S): 400 CAPSULE ORAL at 06:38

## 2023-04-20 RX ADMIN — HYDROMORPHONE HYDROCHLORIDE 0.5 MILLIGRAM(S): 2 INJECTION INTRAMUSCULAR; INTRAVENOUS; SUBCUTANEOUS at 23:00

## 2023-04-20 RX ADMIN — SODIUM CHLORIDE 100 MILLILITER(S): 9 INJECTION, SOLUTION INTRAVENOUS at 20:16

## 2023-04-20 RX ADMIN — Medication 15 MILLIGRAM(S): at 22:16

## 2023-04-20 RX ADMIN — ONDANSETRON 4 MILLIGRAM(S): 8 TABLET, FILM COATED ORAL at 19:33

## 2023-04-20 RX ADMIN — HYDROMORPHONE HYDROCHLORIDE 0.5 MILLIGRAM(S): 2 INJECTION INTRAMUSCULAR; INTRAVENOUS; SUBCUTANEOUS at 21:10

## 2023-04-20 RX ADMIN — HYDROMORPHONE HYDROCHLORIDE 0.5 MILLIGRAM(S): 2 INJECTION INTRAMUSCULAR; INTRAVENOUS; SUBCUTANEOUS at 15:55

## 2023-04-20 RX ADMIN — HYDROMORPHONE HYDROCHLORIDE 0.5 MILLIGRAM(S): 2 INJECTION INTRAMUSCULAR; INTRAVENOUS; SUBCUTANEOUS at 18:35

## 2023-04-20 RX ADMIN — HYDROMORPHONE HYDROCHLORIDE 0.5 MILLIGRAM(S): 2 INJECTION INTRAMUSCULAR; INTRAVENOUS; SUBCUTANEOUS at 16:05

## 2023-04-20 RX ADMIN — ONDANSETRON 4 MILLIGRAM(S): 8 TABLET, FILM COATED ORAL at 14:28

## 2023-04-20 RX ADMIN — HYDROMORPHONE HYDROCHLORIDE 0.5 MILLIGRAM(S): 2 INJECTION INTRAMUSCULAR; INTRAVENOUS; SUBCUTANEOUS at 13:44

## 2023-04-20 RX ADMIN — CELECOXIB 400 MILLIGRAM(S): 200 CAPSULE ORAL at 06:39

## 2023-04-20 RX ADMIN — Medication 1000 MILLIGRAM(S): at 16:50

## 2023-04-20 RX ADMIN — HYDROMORPHONE HYDROCHLORIDE 0.5 MILLIGRAM(S): 2 INJECTION INTRAMUSCULAR; INTRAVENOUS; SUBCUTANEOUS at 22:35

## 2023-04-20 RX ADMIN — Medication 15 MILLIGRAM(S): at 20:10

## 2023-04-20 RX ADMIN — HYDROMORPHONE HYDROCHLORIDE 0.5 MILLIGRAM(S): 2 INJECTION INTRAMUSCULAR; INTRAVENOUS; SUBCUTANEOUS at 14:27

## 2023-04-20 RX ADMIN — Medication 30 MILLIGRAM(S): at 14:47

## 2023-04-20 NOTE — PRE-OP CHECKLIST - LAST TOOK
clears since Tuesday evening last night until 5pm/solids clears since Tuesday evening last night until 5pm/clears

## 2023-04-20 NOTE — BRIEF OPERATIVE NOTE - NSICDXBRIEFPREOP_GEN_ALL_CORE_FT
PRE-OP DIAGNOSIS:  Cancer of sigmoid colon 20-Apr-2023 10:39:43  Oscar Donaldson  TOA (tubo-ovarian abscess) 20-Apr-2023 10:44:08  Oscar Donaldson  
PRE-OP DIAGNOSIS:  TOA (tubo-ovarian abscess) 20-Apr-2023 10:44:08  Oscar Donaldson  Cancer of sigmoid colon 20-Apr-2023 14:04:53  Tori Moy  
PRE-OP DIAGNOSIS:  TOA (tubo-ovarian abscess) 20-Apr-2023 10:44:08  Oscar Donaldson  Cancer of sigmoid colon 20-Apr-2023 14:04:53  Tori Moy

## 2023-04-20 NOTE — BRIEF OPERATIVE NOTE - NSICDXBRIEFPROCEDURE_GEN_ALL_CORE_FT
PROCEDURES:  Left salpingoophorectomy 20-Apr-2023 10:38:50  Oscar Donaldson E  
PROCEDURES:  Left salpingoophorectomy 20-Apr-2023 10:38:50  Oscar Donaldson  Laparoscopic partial left colectomy 20-Apr-2023 14:09:59 with mini laparotomy Tori Moy  
PROCEDURES:  Left salpingoophorectomy 20-Apr-2023 10:38:50  Oscar Donaldson  Laparoscopic partial left colectomy 20-Apr-2023 14:09:59 with mini laparotomy Tori Moy  Cystoscopy with insertion of bilateral ureteral stents for laparoscopic sigmoid colectomy 20-Apr-2023 14:16:13  Tori Moy

## 2023-04-20 NOTE — BRIEF OPERATIVE NOTE - COMMENTS
Class 2
Class 2 clean contaminated
Intraoperative decision in light of known sigmoid neoplasia and enlarged left ovary, post TOA indicated left salpingooophorectomy.  Possible resection ad performed was discussed with the patient and her sister in my office  Risks and benefits discussed in detail

## 2023-04-20 NOTE — BRIEF OPERATIVE NOTE - OPERATION/FINDINGS
mass of left colon
pelvic adhesions  enlarged left ovary
normal bladder neck , strictured urethral opening

## 2023-04-20 NOTE — BRIEF OPERATIVE NOTE - NSICDXBRIEFPOSTOP_GEN_ALL_CORE_FT
POST-OP DIAGNOSIS:  Cancer of sigmoid colon 20-Apr-2023 14:05:57  Tori Moy  
POST-OP DIAGNOSIS:  Cancer of sigmoid colon 20-Apr-2023 14:05:57  Tori Moy

## 2023-04-21 DIAGNOSIS — N35.919 UNSPECIFIED URETHRAL STRICTURE, MALE, UNSPECIFIED SITE: ICD-10-CM

## 2023-04-21 LAB
ANION GAP SERPL CALC-SCNC: 6 MMOL/L — SIGNIFICANT CHANGE UP (ref 5–17)
BUN SERPL-MCNC: 5 MG/DL — LOW (ref 7–23)
CALCIUM SERPL-MCNC: 8.8 MG/DL — SIGNIFICANT CHANGE UP (ref 8.4–10.5)
CHLORIDE SERPL-SCNC: 107 MMOL/L — SIGNIFICANT CHANGE UP (ref 96–108)
CO2 SERPL-SCNC: 29 MMOL/L — SIGNIFICANT CHANGE UP (ref 22–31)
CREAT SERPL-MCNC: 0.59 MG/DL — SIGNIFICANT CHANGE UP (ref 0.5–1.3)
EGFR: 116 ML/MIN/1.73M2 — SIGNIFICANT CHANGE UP
GLUCOSE SERPL-MCNC: 91 MG/DL — SIGNIFICANT CHANGE UP (ref 70–99)
HCT VFR BLD CALC: 22.3 % — LOW (ref 34.5–45)
HGB BLD-MCNC: 6.7 G/DL — CRITICAL LOW (ref 11.5–15.5)
MCHC RBC-ENTMCNC: 22 PG — LOW (ref 27–34)
MCHC RBC-ENTMCNC: 30 GM/DL — LOW (ref 32–36)
MCV RBC AUTO: 73.4 FL — LOW (ref 80–100)
PLATELET # BLD AUTO: 369 K/UL — SIGNIFICANT CHANGE UP (ref 150–400)
POTASSIUM SERPL-MCNC: 4 MMOL/L — SIGNIFICANT CHANGE UP (ref 3.5–5.3)
POTASSIUM SERPL-SCNC: 4 MMOL/L — SIGNIFICANT CHANGE UP (ref 3.5–5.3)
RBC # BLD: 3.04 M/UL — LOW (ref 3.8–5.2)
RBC # FLD: 16.9 % — HIGH (ref 10.3–14.5)
SODIUM SERPL-SCNC: 142 MMOL/L — SIGNIFICANT CHANGE UP (ref 135–145)
WBC # BLD: 15.04 K/UL — HIGH (ref 3.8–10.5)
WBC # FLD AUTO: 15.04 K/UL — HIGH (ref 3.8–10.5)

## 2023-04-21 PROCEDURE — G0463: CPT

## 2023-04-21 PROCEDURE — 93010 ELECTROCARDIOGRAM REPORT: CPT

## 2023-04-21 PROCEDURE — 36415 COLL VENOUS BLD VENIPUNCTURE: CPT

## 2023-04-21 PROCEDURE — 86901 BLOOD TYPING SEROLOGIC RH(D): CPT

## 2023-04-21 PROCEDURE — 85730 THROMBOPLASTIN TIME PARTIAL: CPT

## 2023-04-21 PROCEDURE — 86850 RBC ANTIBODY SCREEN: CPT

## 2023-04-21 PROCEDURE — 83036 HEMOGLOBIN GLYCOSYLATED A1C: CPT

## 2023-04-21 PROCEDURE — 85610 PROTHROMBIN TIME: CPT

## 2023-04-21 PROCEDURE — 86900 BLOOD TYPING SEROLOGIC ABO: CPT

## 2023-04-21 PROCEDURE — 85027 COMPLETE CBC AUTOMATED: CPT

## 2023-04-21 PROCEDURE — 86923 COMPATIBILITY TEST ELECTRIC: CPT

## 2023-04-21 RX ORDER — BENZOCAINE AND MENTHOL 5; 1 G/100ML; G/100ML
1 LIQUID ORAL
Refills: 0 | Status: DISCONTINUED | OUTPATIENT
Start: 2023-04-21 | End: 2023-04-26

## 2023-04-21 RX ADMIN — Medication 15 MILLIGRAM(S): at 01:57

## 2023-04-21 RX ADMIN — Medication 1 TABLET(S): at 17:29

## 2023-04-21 RX ADMIN — SODIUM CHLORIDE 100 MILLILITER(S): 9 INJECTION, SOLUTION INTRAVENOUS at 19:00

## 2023-04-21 RX ADMIN — HYDROMORPHONE HYDROCHLORIDE 0.5 MILLIGRAM(S): 2 INJECTION INTRAMUSCULAR; INTRAVENOUS; SUBCUTANEOUS at 06:47

## 2023-04-21 RX ADMIN — Medication 400 MILLIGRAM(S): at 15:27

## 2023-04-21 RX ADMIN — HYDROMORPHONE HYDROCHLORIDE 0.5 MILLIGRAM(S): 2 INJECTION INTRAMUSCULAR; INTRAVENOUS; SUBCUTANEOUS at 21:18

## 2023-04-21 RX ADMIN — BENZOCAINE AND MENTHOL 1 LOZENGE: 5; 1 LIQUID ORAL at 03:40

## 2023-04-21 RX ADMIN — HYDROMORPHONE HYDROCHLORIDE 2 MILLIGRAM(S): 2 INJECTION INTRAMUSCULAR; INTRAVENOUS; SUBCUTANEOUS at 22:12

## 2023-04-21 RX ADMIN — HYDROMORPHONE HYDROCHLORIDE 0.5 MILLIGRAM(S): 2 INJECTION INTRAMUSCULAR; INTRAVENOUS; SUBCUTANEOUS at 12:25

## 2023-04-21 RX ADMIN — HYDROMORPHONE HYDROCHLORIDE 0.5 MILLIGRAM(S): 2 INJECTION INTRAMUSCULAR; INTRAVENOUS; SUBCUTANEOUS at 20:48

## 2023-04-21 RX ADMIN — HYDROMORPHONE HYDROCHLORIDE 0.5 MILLIGRAM(S): 2 INJECTION INTRAMUSCULAR; INTRAVENOUS; SUBCUTANEOUS at 03:10

## 2023-04-21 RX ADMIN — Medication 15 MILLIGRAM(S): at 02:30

## 2023-04-21 RX ADMIN — Medication 15 MILLIGRAM(S): at 08:13

## 2023-04-21 RX ADMIN — Medication 1000 MILLIGRAM(S): at 02:30

## 2023-04-21 RX ADMIN — Medication 30 MILLIGRAM(S): at 01:46

## 2023-04-21 RX ADMIN — Medication 15 MILLIGRAM(S): at 14:53

## 2023-04-21 RX ADMIN — HYDROMORPHONE HYDROCHLORIDE 0.5 MILLIGRAM(S): 2 INJECTION INTRAMUSCULAR; INTRAVENOUS; SUBCUTANEOUS at 02:43

## 2023-04-21 RX ADMIN — HYDROMORPHONE HYDROCHLORIDE 2 MILLIGRAM(S): 2 INJECTION INTRAMUSCULAR; INTRAVENOUS; SUBCUTANEOUS at 21:42

## 2023-04-21 RX ADMIN — Medication 400 MILLIGRAM(S): at 01:57

## 2023-04-21 RX ADMIN — HYDROMORPHONE HYDROCHLORIDE 2 MILLIGRAM(S): 2 INJECTION INTRAMUSCULAR; INTRAVENOUS; SUBCUTANEOUS at 13:52

## 2023-04-21 RX ADMIN — SODIUM CHLORIDE 75 MILLILITER(S): 9 INJECTION, SOLUTION INTRAVENOUS at 20:52

## 2023-04-21 RX ADMIN — Medication 1 TABLET(S): at 08:22

## 2023-04-21 RX ADMIN — Medication 400 MILLIGRAM(S): at 08:13

## 2023-04-21 NOTE — DIETITIAN INITIAL EVALUATION ADULT - PERTINENT MEDS FT
MEDICATIONS  (STANDING):  acetaminophen   IVPB .. 1000 milliGRAM(s) IV Intermittent every 6 hours  enoxaparin Injectable 40 milliGRAM(s) SubCutaneous every 24 hours  ketorolac   Injectable 15 milliGRAM(s) IV Push every 6 hours  lactated ringers. 1000 milliLiter(s) (100 mL/Hr) IV Continuous <Continuous>  lactobacillus acidophilus 1 Tablet(s) Oral two times a day with meals  sodium chloride 0.9% Bolus 500 milliLiter(s) IV Bolus once    MEDICATIONS  (PRN):  benzocaine/menthol Lozenge 1 Lozenge Oral every 2 hours PRN Sore Throat  diazepam    Tablet 2 milliGRAM(s) Oral every 6 hours PRN muscle spasm  HYDROmorphone   Tablet 2 milliGRAM(s) Oral every 4 hours PRN Moderate Pain (4 - 6)  HYDROmorphone  Injectable 0.5 milliGRAM(s) IV Push every 4 hours PRN Severe Pain (7 - 10)  ondansetron Injectable 4 milliGRAM(s) IV Push every 6 hours PRN Nausea and/or Vomiting

## 2023-04-21 NOTE — DIETITIAN INITIAL EVALUATION ADULT - ETIOLOGY
related to inadequate protein-energy intake in setting of altered GI fxn, on low fiber (restrictive) diet

## 2023-04-21 NOTE — DIETITIAN INITIAL EVALUATION ADULT - OTHER INFO
42 yr female hx anemia, tubo-ovarian abscess, colonic polyps , now pt POD 0 , S/p Left salpingoophorectomy , Laparoscopic partial left colectomy with mini laparotomy , Cystoscopy with insertion of bilateral ureteral stents for laparoscopic sigmoid colectomy   Pt POD#1, now advanced to clear liquid diet + Ensure Clear TID. States that she is tolerated well. No BM or flatus yet, but pt endorses feeling increased bowel activity. Recommend advance to low fiber diet with Ensure supplement as tolerated. Low fiber diet reviewed + written materials provided for reference.

## 2023-04-21 NOTE — DIETITIAN INITIAL EVALUATION ADULT - ADD RECOMMEND
1. Advance to low fiber + Ensure High Protein TID as tolerated  2. Continue Ensure Clear TID while on clear liquids   3. Low fiber diet education

## 2023-04-21 NOTE — DIETITIAN NUTRITION RISK NOTIFICATION - TREATMENT: THE FOLLOWING DIET HAS BEEN RECOMMENDED
Diet, Clear Liquid:   Ensure Clear Cans or Servings Per Day:  1       Frequency:  Three Times a day     Special Instructions for Nursing:  To include 1 Ensure clear, 1 Lemon Ice, 2 cups of hot water to make tea or broth.  Caffeinated coffee is permissible (04-20-23 @ 14:55) [Active]

## 2023-04-21 NOTE — DIETITIAN INITIAL EVALUATION ADULT - PERTINENT LABORATORY DATA
04-21    142  |  107  |  5<L>  ----------------------------<  91  4.0   |  29  |  0.59    Ca    8.8      21 Apr 2023 06:40  Phos  3.3     04-20  Mg     1.5     04-20    A1C with Estimated Average Glucose Result: 5.2 % (04-12-23 @ 15:13)

## 2023-04-21 NOTE — DIETITIAN INITIAL EVALUATION ADULT - ORAL INTAKE PTA/DIET HISTORY
Pt endorses decreased appetite while on low fiber diet pre-surgery. Pt met with RD prior to surgery for ERP. Reports taking Ensure pre-surgery as directed. NKFA. No chewing/swallowing issues.

## 2023-04-21 NOTE — DIETITIAN INITIAL EVALUATION ADULT - NS FNS DIET ORDER
Diet, Clear Liquid:   Ensure Clear Cans or Servings Per Day:  1       Frequency:  Three Times a day     Special Instructions for Nursing:  To include 1 Ensure clear, 1 Lemon Ice, 2 cups of hot water to make tea or broth.  Caffeinated coffee is permissible (04-20-23 @ 14:55)

## 2023-04-22 LAB
ANION GAP SERPL CALC-SCNC: 4 MMOL/L — LOW (ref 5–17)
BUN SERPL-MCNC: 4 MG/DL — LOW (ref 7–23)
CALCIUM SERPL-MCNC: 8.5 MG/DL — SIGNIFICANT CHANGE UP (ref 8.4–10.5)
CHLORIDE SERPL-SCNC: 105 MMOL/L — SIGNIFICANT CHANGE UP (ref 96–108)
CO2 SERPL-SCNC: 31 MMOL/L — SIGNIFICANT CHANGE UP (ref 22–31)
CREAT SERPL-MCNC: 0.5 MG/DL — SIGNIFICANT CHANGE UP (ref 0.5–1.3)
EGFR: 120 ML/MIN/1.73M2 — SIGNIFICANT CHANGE UP
GLUCOSE SERPL-MCNC: 83 MG/DL — SIGNIFICANT CHANGE UP (ref 70–99)
HCT VFR BLD CALC: 27 % — LOW (ref 34.5–45)
HGB BLD-MCNC: 8.3 G/DL — LOW (ref 11.5–15.5)
MCHC RBC-ENTMCNC: 23.2 PG — LOW (ref 27–34)
MCHC RBC-ENTMCNC: 30.7 GM/DL — LOW (ref 32–36)
MCV RBC AUTO: 75.4 FL — LOW (ref 80–100)
NRBC # BLD: 0 /100 WBCS — SIGNIFICANT CHANGE UP (ref 0–0)
PLATELET # BLD AUTO: 332 K/UL — SIGNIFICANT CHANGE UP (ref 150–400)
POTASSIUM SERPL-MCNC: 3.9 MMOL/L — SIGNIFICANT CHANGE UP (ref 3.5–5.3)
POTASSIUM SERPL-SCNC: 3.9 MMOL/L — SIGNIFICANT CHANGE UP (ref 3.5–5.3)
RBC # BLD: 3.58 M/UL — LOW (ref 3.8–5.2)
RBC # FLD: 17.3 % — HIGH (ref 10.3–14.5)
SODIUM SERPL-SCNC: 140 MMOL/L — SIGNIFICANT CHANGE UP (ref 135–145)
WBC # BLD: 9.05 K/UL — SIGNIFICANT CHANGE UP (ref 3.8–10.5)
WBC # FLD AUTO: 9.05 K/UL — SIGNIFICANT CHANGE UP (ref 3.8–10.5)

## 2023-04-22 RX ORDER — KETOROLAC TROMETHAMINE 30 MG/ML
15 SYRINGE (ML) INJECTION EVERY 6 HOURS
Refills: 0 | Status: DISCONTINUED | OUTPATIENT
Start: 2023-04-22 | End: 2023-04-22

## 2023-04-22 RX ORDER — KETOROLAC TROMETHAMINE 30 MG/ML
30 SYRINGE (ML) INJECTION ONCE
Refills: 0 | Status: DISCONTINUED | OUTPATIENT
Start: 2023-04-22 | End: 2023-04-22

## 2023-04-22 RX ORDER — SODIUM CHLORIDE 9 MG/ML
1000 INJECTION, SOLUTION INTRAVENOUS
Refills: 0 | Status: DISCONTINUED | OUTPATIENT
Start: 2023-04-22 | End: 2023-04-26

## 2023-04-22 RX ORDER — ONDANSETRON 8 MG/1
4 TABLET, FILM COATED ORAL ONCE
Refills: 0 | Status: COMPLETED | OUTPATIENT
Start: 2023-04-22 | End: 2023-04-22

## 2023-04-22 RX ORDER — HYDROMORPHONE HYDROCHLORIDE 2 MG/ML
0.5 INJECTION INTRAMUSCULAR; INTRAVENOUS; SUBCUTANEOUS ONCE
Refills: 0 | Status: DISCONTINUED | OUTPATIENT
Start: 2023-04-22 | End: 2023-04-22

## 2023-04-22 RX ORDER — PANTOPRAZOLE SODIUM 20 MG/1
40 TABLET, DELAYED RELEASE ORAL DAILY
Refills: 0 | Status: DISCONTINUED | OUTPATIENT
Start: 2023-04-22 | End: 2023-04-26

## 2023-04-22 RX ORDER — ACETAMINOPHEN 500 MG
975 TABLET ORAL EVERY 6 HOURS
Refills: 0 | Status: DISCONTINUED | OUTPATIENT
Start: 2023-04-22 | End: 2023-04-26

## 2023-04-22 RX ORDER — TRAMADOL HYDROCHLORIDE 50 MG/1
100 TABLET ORAL ONCE
Refills: 0 | Status: DISCONTINUED | OUTPATIENT
Start: 2023-04-22 | End: 2023-04-22

## 2023-04-22 RX ORDER — METOCLOPRAMIDE HCL 10 MG
10 TABLET ORAL ONCE
Refills: 0 | Status: COMPLETED | OUTPATIENT
Start: 2023-04-22 | End: 2023-04-22

## 2023-04-22 RX ADMIN — Medication 15 MILLIGRAM(S): at 12:15

## 2023-04-22 RX ADMIN — HYDROMORPHONE HYDROCHLORIDE 2 MILLIGRAM(S): 2 INJECTION INTRAMUSCULAR; INTRAVENOUS; SUBCUTANEOUS at 08:51

## 2023-04-22 RX ADMIN — TRAMADOL HYDROCHLORIDE 100 MILLIGRAM(S): 50 TABLET ORAL at 03:34

## 2023-04-22 RX ADMIN — Medication 15 MILLIGRAM(S): at 23:07

## 2023-04-22 RX ADMIN — Medication 1 TABLET(S): at 16:51

## 2023-04-22 RX ADMIN — Medication 15 MILLIGRAM(S): at 17:45

## 2023-04-22 RX ADMIN — HYDROMORPHONE HYDROCHLORIDE 2 MILLIGRAM(S): 2 INJECTION INTRAMUSCULAR; INTRAVENOUS; SUBCUTANEOUS at 09:30

## 2023-04-22 RX ADMIN — TRAMADOL HYDROCHLORIDE 100 MILLIGRAM(S): 50 TABLET ORAL at 04:00

## 2023-04-22 RX ADMIN — HYDROMORPHONE HYDROCHLORIDE 0.5 MILLIGRAM(S): 2 INJECTION INTRAMUSCULAR; INTRAVENOUS; SUBCUTANEOUS at 00:04

## 2023-04-22 RX ADMIN — Medication 30 MILLIGRAM(S): at 01:16

## 2023-04-22 RX ADMIN — Medication 15 MILLIGRAM(S): at 11:57

## 2023-04-22 RX ADMIN — Medication 2 MILLIGRAM(S): at 03:03

## 2023-04-22 RX ADMIN — Medication 975 MILLIGRAM(S): at 13:00

## 2023-04-22 RX ADMIN — ENOXAPARIN SODIUM 40 MILLIGRAM(S): 100 INJECTION SUBCUTANEOUS at 11:57

## 2023-04-22 RX ADMIN — HYDROMORPHONE HYDROCHLORIDE 0.5 MILLIGRAM(S): 2 INJECTION INTRAMUSCULAR; INTRAVENOUS; SUBCUTANEOUS at 01:16

## 2023-04-22 RX ADMIN — Medication 1 TABLET(S): at 08:47

## 2023-04-22 RX ADMIN — Medication 15 MILLIGRAM(S): at 17:32

## 2023-04-22 RX ADMIN — HYDROMORPHONE HYDROCHLORIDE 0.5 MILLIGRAM(S): 2 INJECTION INTRAMUSCULAR; INTRAVENOUS; SUBCUTANEOUS at 01:46

## 2023-04-22 RX ADMIN — HYDROMORPHONE HYDROCHLORIDE 0.5 MILLIGRAM(S): 2 INJECTION INTRAMUSCULAR; INTRAVENOUS; SUBCUTANEOUS at 00:34

## 2023-04-22 RX ADMIN — Medication 15 MILLIGRAM(S): at 23:30

## 2023-04-22 RX ADMIN — ONDANSETRON 4 MILLIGRAM(S): 8 TABLET, FILM COATED ORAL at 16:51

## 2023-04-22 RX ADMIN — ONDANSETRON 4 MILLIGRAM(S): 8 TABLET, FILM COATED ORAL at 19:22

## 2023-04-22 RX ADMIN — Medication 10 MILLIGRAM(S): at 21:11

## 2023-04-22 RX ADMIN — Medication 975 MILLIGRAM(S): at 11:57

## 2023-04-23 LAB
ANION GAP SERPL CALC-SCNC: 6 MMOL/L — SIGNIFICANT CHANGE UP (ref 5–17)
BUN SERPL-MCNC: 8 MG/DL — SIGNIFICANT CHANGE UP (ref 7–23)
CALCIUM SERPL-MCNC: 8.4 MG/DL — SIGNIFICANT CHANGE UP (ref 8.4–10.5)
CHLORIDE SERPL-SCNC: 104 MMOL/L — SIGNIFICANT CHANGE UP (ref 96–108)
CO2 SERPL-SCNC: 30 MMOL/L — SIGNIFICANT CHANGE UP (ref 22–31)
CREAT SERPL-MCNC: 0.56 MG/DL — SIGNIFICANT CHANGE UP (ref 0.5–1.3)
EGFR: 118 ML/MIN/1.73M2 — SIGNIFICANT CHANGE UP
GLUCOSE SERPL-MCNC: 100 MG/DL — HIGH (ref 70–99)
HCT VFR BLD CALC: 27.4 % — LOW (ref 34.5–45)
HGB BLD-MCNC: 8.5 G/DL — LOW (ref 11.5–15.5)
MCHC RBC-ENTMCNC: 23 PG — LOW (ref 27–34)
MCHC RBC-ENTMCNC: 31 GM/DL — LOW (ref 32–36)
MCV RBC AUTO: 74.1 FL — LOW (ref 80–100)
NRBC # BLD: 0 /100 WBCS — SIGNIFICANT CHANGE UP (ref 0–0)
PLATELET # BLD AUTO: 396 K/UL — SIGNIFICANT CHANGE UP (ref 150–400)
POTASSIUM SERPL-MCNC: 3.3 MMOL/L — LOW (ref 3.5–5.3)
POTASSIUM SERPL-SCNC: 3.3 MMOL/L — LOW (ref 3.5–5.3)
RBC # BLD: 3.7 M/UL — LOW (ref 3.8–5.2)
RBC # FLD: 17.4 % — HIGH (ref 10.3–14.5)
SODIUM SERPL-SCNC: 140 MMOL/L — SIGNIFICANT CHANGE UP (ref 135–145)
WBC # BLD: 9.25 K/UL — SIGNIFICANT CHANGE UP (ref 3.8–10.5)
WBC # FLD AUTO: 9.25 K/UL — SIGNIFICANT CHANGE UP (ref 3.8–10.5)

## 2023-04-23 PROCEDURE — 93010 ELECTROCARDIOGRAM REPORT: CPT

## 2023-04-23 RX ORDER — POTASSIUM CHLORIDE 20 MEQ
40 PACKET (EA) ORAL ONCE
Refills: 0 | Status: DISCONTINUED | OUTPATIENT
Start: 2023-04-23 | End: 2023-04-23

## 2023-04-23 RX ORDER — ACETAMINOPHEN 500 MG
675 TABLET ORAL ONCE
Refills: 0 | Status: COMPLETED | OUTPATIENT
Start: 2023-04-23 | End: 2023-04-23

## 2023-04-23 RX ORDER — METOCLOPRAMIDE HCL 10 MG
5 TABLET ORAL EVERY 6 HOURS
Refills: 0 | Status: DISCONTINUED | OUTPATIENT
Start: 2023-04-23 | End: 2023-04-26

## 2023-04-23 RX ORDER — POTASSIUM CHLORIDE 20 MEQ
10 PACKET (EA) ORAL
Refills: 0 | Status: COMPLETED | OUTPATIENT
Start: 2023-04-23 | End: 2023-04-23

## 2023-04-23 RX ORDER — METOCLOPRAMIDE HCL 10 MG
5 TABLET ORAL ONCE
Refills: 0 | Status: COMPLETED | OUTPATIENT
Start: 2023-04-23 | End: 2023-04-23

## 2023-04-23 RX ADMIN — PANTOPRAZOLE SODIUM 40 MILLIGRAM(S): 20 TABLET, DELAYED RELEASE ORAL at 12:00

## 2023-04-23 RX ADMIN — Medication 270 MILLIGRAM(S): at 10:44

## 2023-04-23 RX ADMIN — ENOXAPARIN SODIUM 40 MILLIGRAM(S): 100 INJECTION SUBCUTANEOUS at 12:00

## 2023-04-23 RX ADMIN — Medication 100 MILLIEQUIVALENT(S): at 10:44

## 2023-04-23 RX ADMIN — Medication 5 MILLIGRAM(S): at 23:51

## 2023-04-23 RX ADMIN — Medication 5 MILLIGRAM(S): at 11:26

## 2023-04-23 RX ADMIN — Medication 100 MILLIEQUIVALENT(S): at 12:00

## 2023-04-23 RX ADMIN — Medication 1 TABLET(S): at 09:29

## 2023-04-23 RX ADMIN — Medication 5 MILLIGRAM(S): at 17:45

## 2023-04-23 RX ADMIN — Medication 270 MILLIGRAM(S): at 23:51

## 2023-04-23 RX ADMIN — HYDROMORPHONE HYDROCHLORIDE 0.5 MILLIGRAM(S): 2 INJECTION INTRAMUSCULAR; INTRAVENOUS; SUBCUTANEOUS at 06:42

## 2023-04-23 RX ADMIN — Medication 5 MILLIGRAM(S): at 05:10

## 2023-04-23 RX ADMIN — Medication 675 MILLIGRAM(S): at 12:08

## 2023-04-23 RX ADMIN — Medication 270 MILLIGRAM(S): at 17:45

## 2023-04-23 RX ADMIN — Medication 675 MILLIGRAM(S): at 18:35

## 2023-04-24 LAB
ANION GAP SERPL CALC-SCNC: 10 MMOL/L — SIGNIFICANT CHANGE UP (ref 5–17)
BUN SERPL-MCNC: 8 MG/DL — SIGNIFICANT CHANGE UP (ref 7–23)
CALCIUM SERPL-MCNC: 8.5 MG/DL — SIGNIFICANT CHANGE UP (ref 8.4–10.5)
CHLORIDE SERPL-SCNC: 104 MMOL/L — SIGNIFICANT CHANGE UP (ref 96–108)
CO2 SERPL-SCNC: 25 MMOL/L — SIGNIFICANT CHANGE UP (ref 22–31)
CREAT SERPL-MCNC: 0.36 MG/DL — LOW (ref 0.5–1.3)
EGFR: 130 ML/MIN/1.73M2 — SIGNIFICANT CHANGE UP
GLUCOSE SERPL-MCNC: 96 MG/DL — SIGNIFICANT CHANGE UP (ref 70–99)
HCT VFR BLD CALC: 28.2 % — LOW (ref 34.5–45)
HGB BLD-MCNC: 8.7 G/DL — LOW (ref 11.5–15.5)
MCHC RBC-ENTMCNC: 22.8 PG — LOW (ref 27–34)
MCHC RBC-ENTMCNC: 30.9 GM/DL — LOW (ref 32–36)
MCV RBC AUTO: 73.8 FL — LOW (ref 80–100)
NRBC # BLD: 0 /100 WBCS — SIGNIFICANT CHANGE UP (ref 0–0)
PLATELET # BLD AUTO: 413 K/UL — HIGH (ref 150–400)
POTASSIUM SERPL-MCNC: 3.4 MMOL/L — LOW (ref 3.5–5.3)
POTASSIUM SERPL-SCNC: 3.4 MMOL/L — LOW (ref 3.5–5.3)
RBC # BLD: 3.82 M/UL — SIGNIFICANT CHANGE UP (ref 3.8–5.2)
RBC # FLD: 17.7 % — HIGH (ref 10.3–14.5)
SODIUM SERPL-SCNC: 139 MMOL/L — SIGNIFICANT CHANGE UP (ref 135–145)
WBC # BLD: 8.22 K/UL — SIGNIFICANT CHANGE UP (ref 3.8–10.5)
WBC # FLD AUTO: 8.22 K/UL — SIGNIFICANT CHANGE UP (ref 3.8–10.5)

## 2023-04-24 PROCEDURE — 74019 RADEX ABDOMEN 2 VIEWS: CPT | Mod: 26

## 2023-04-24 RX ORDER — KETOROLAC TROMETHAMINE 30 MG/ML
15 SYRINGE (ML) INJECTION ONCE
Refills: 0 | Status: DISCONTINUED | OUTPATIENT
Start: 2023-04-24 | End: 2023-04-24

## 2023-04-24 RX ORDER — ACETAMINOPHEN 500 MG
675 TABLET ORAL ONCE
Refills: 0 | Status: COMPLETED | OUTPATIENT
Start: 2023-04-24 | End: 2023-04-24

## 2023-04-24 RX ORDER — POTASSIUM CHLORIDE 20 MEQ
10 PACKET (EA) ORAL
Refills: 0 | Status: COMPLETED | OUTPATIENT
Start: 2023-04-24 | End: 2023-04-24

## 2023-04-24 RX ORDER — ACETAMINOPHEN 500 MG
675 TABLET ORAL ONCE
Refills: 0 | Status: COMPLETED | OUTPATIENT
Start: 2023-04-25 | End: 2023-04-24

## 2023-04-24 RX ADMIN — Medication 270 MILLIGRAM(S): at 06:45

## 2023-04-24 RX ADMIN — Medication 100 MILLIEQUIVALENT(S): at 10:39

## 2023-04-24 RX ADMIN — Medication 5 MILLIGRAM(S): at 06:35

## 2023-04-24 RX ADMIN — Medication 5 MILLIGRAM(S): at 20:20

## 2023-04-24 RX ADMIN — Medication 100 MILLIEQUIVALENT(S): at 09:31

## 2023-04-24 RX ADMIN — Medication 270 MILLIGRAM(S): at 17:39

## 2023-04-24 RX ADMIN — Medication 270 MILLIGRAM(S): at 23:55

## 2023-04-24 RX ADMIN — Medication 15 MILLIGRAM(S): at 11:33

## 2023-04-24 RX ADMIN — Medication 675 MILLIGRAM(S): at 19:04

## 2023-04-24 RX ADMIN — Medication 15 MILLIGRAM(S): at 12:03

## 2023-04-24 RX ADMIN — PANTOPRAZOLE SODIUM 40 MILLIGRAM(S): 20 TABLET, DELAYED RELEASE ORAL at 11:33

## 2023-04-24 RX ADMIN — Medication 5 MILLIGRAM(S): at 13:44

## 2023-04-24 RX ADMIN — ENOXAPARIN SODIUM 40 MILLIGRAM(S): 100 INJECTION SUBCUTANEOUS at 11:33

## 2023-04-24 NOTE — CHART NOTE - NSCHARTNOTEFT_GEN_A_CORE
called by RN for pt c/o chest tightness,   pt seen and examined, pt states she is feeling chest tightness, no radiation , no focal chest pain '  pt received dilaudid 0.5 15 min prior for abd pain . pt was laying supine upon assessment.   pt also endorsed throat pain, pt is able to take sips of water without any discomfort.  no visual findings found on physical assessment of her throat.     A/P referred chest tightness  ecg obtained : NSR 70 , NAD, no acute ischemia    will order cepacol for throat discomfort , post intubation   cont supp o2,   Chest tightness decreased post elevation of  HOD to 40Degrees    RN staff and family  at bedside upon my assessment
Nutrition Follow Up Note  Hospital Course (Per Electronic Medical Record):   Source: Medical Record [X] Patient [X] Family [X] Nursing Staff [X]     Diet: Clear Fluids , Ensure Clear TID ,    Patient with c/o abdominal pain , noted with ? ileus , vomiting noted over night , no further episodes noted , LR @ 100ml/hr infusing . May suggest change diet to NPO until patient able to tolerate po fluids , Labs reviewed , patient received 1 unit PRBC (4/21) Hgb/ Hct noted stable     Current Weight: (4/24) 116.8/52.9kg                          (4/23) 116.8/53kg     Pertinent Medications: MEDICATIONS  (STANDING):  acetaminophen     Tablet .. 975 milliGRAM(s) Oral every 6 hours  enoxaparin Injectable 40 milliGRAM(s) SubCutaneous every 24 hours  lactated ringers. 1000 milliLiter(s) (100 mL/Hr) IV Continuous <Continuous>  lactobacillus acidophilus 1 Tablet(s) Oral two times a day with meals  pantoprazole  Injectable 40 milliGRAM(s) IV Push daily  sodium chloride 0.9% Bolus 500 milliLiter(s) IV Bolus once    MEDICATIONS  (PRN):  benzocaine/menthol Lozenge 1 Lozenge Oral every 2 hours PRN Sore Throat  diazepam    Tablet 2 milliGRAM(s) Oral every 6 hours PRN muscle spasm  HYDROmorphone   Tablet 2 milliGRAM(s) Oral every 4 hours PRN Moderate Pain (4 - 6)  HYDROmorphone  Injectable 0.5 milliGRAM(s) IV Push every 4 hours PRN Severe Pain (7 - 10)  metoclopramide Injectable 5 milliGRAM(s) IV Push every 6 hours PRN Nausea/vomiting      Pertinent Labs:  04-24 Na139 mmol/L Glu 96 mg/dL K+ 3.4 mmol/L<L> Cr  0.36 mg/dL<L> BUN 8 mg/dL 04-20 Phos 3.3 mg/dL        Skin: surgical incision noted     Edema: none    Last BM: none    Estimated Needs:   [X] No Change since Previous Assessment    Previous Nutrition Diagnosis: Moderate Protein Calorie Malnutrition     Nutrition Diagnosis is [X] Ongoing         New Nutrition Diagnosis:  [X] Altered GI Function related to ? ileus as patient POD # 4 ,     Interventions:   1. suggest change diet to NPO       Monitoring & Evaluation: will monitor:  [X] Weights   [X] Follow Up (Per Protocol)  [X] Tolerance to Diet Prescription       RD to follow as per Nutrition protocol  Radha Wong RDN

## 2023-04-25 LAB
ANION GAP SERPL CALC-SCNC: 11 MMOL/L — SIGNIFICANT CHANGE UP (ref 5–17)
BUN SERPL-MCNC: 12 MG/DL — SIGNIFICANT CHANGE UP (ref 7–23)
CALCIUM SERPL-MCNC: 8.8 MG/DL — SIGNIFICANT CHANGE UP (ref 8.4–10.5)
CHLORIDE SERPL-SCNC: 102 MMOL/L — SIGNIFICANT CHANGE UP (ref 96–108)
CO2 SERPL-SCNC: 24 MMOL/L — SIGNIFICANT CHANGE UP (ref 22–31)
CREAT SERPL-MCNC: 0.52 MG/DL — SIGNIFICANT CHANGE UP (ref 0.5–1.3)
EGFR: 120 ML/MIN/1.73M2 — SIGNIFICANT CHANGE UP
GLUCOSE SERPL-MCNC: 94 MG/DL — SIGNIFICANT CHANGE UP (ref 70–99)
HCT VFR BLD CALC: 31 % — LOW (ref 34.5–45)
HGB BLD-MCNC: 9.7 G/DL — LOW (ref 11.5–15.5)
MAGNESIUM SERPL-MCNC: 1.8 MG/DL — SIGNIFICANT CHANGE UP (ref 1.6–2.6)
MCHC RBC-ENTMCNC: 23.3 PG — LOW (ref 27–34)
MCHC RBC-ENTMCNC: 31.3 GM/DL — LOW (ref 32–36)
MCV RBC AUTO: 74.5 FL — LOW (ref 80–100)
NRBC # BLD: 0 /100 WBCS — SIGNIFICANT CHANGE UP (ref 0–0)
PLATELET # BLD AUTO: 427 K/UL — HIGH (ref 150–400)
POTASSIUM SERPL-MCNC: 3.8 MMOL/L — SIGNIFICANT CHANGE UP (ref 3.5–5.3)
POTASSIUM SERPL-SCNC: 3.8 MMOL/L — SIGNIFICANT CHANGE UP (ref 3.5–5.3)
RBC # BLD: 4.16 M/UL — SIGNIFICANT CHANGE UP (ref 3.8–5.2)
RBC # FLD: 18.2 % — HIGH (ref 10.3–14.5)
SODIUM SERPL-SCNC: 137 MMOL/L — SIGNIFICANT CHANGE UP (ref 135–145)
WBC # BLD: 8.81 K/UL — SIGNIFICANT CHANGE UP (ref 3.8–10.5)
WBC # FLD AUTO: 8.81 K/UL — SIGNIFICANT CHANGE UP (ref 3.8–10.5)

## 2023-04-25 RX ORDER — ACETAMINOPHEN 500 MG
675 TABLET ORAL ONCE
Refills: 0 | Status: COMPLETED | OUTPATIENT
Start: 2023-04-25 | End: 2023-04-25

## 2023-04-25 RX ORDER — KETOROLAC TROMETHAMINE 30 MG/ML
15 SYRINGE (ML) INJECTION EVERY 6 HOURS
Refills: 0 | Status: DISCONTINUED | OUTPATIENT
Start: 2023-04-25 | End: 2023-04-26

## 2023-04-25 RX ORDER — TRAMADOL HYDROCHLORIDE 50 MG/1
25 TABLET ORAL EVERY 4 HOURS
Refills: 0 | Status: DISCONTINUED | OUTPATIENT
Start: 2023-04-25 | End: 2023-04-26

## 2023-04-25 RX ORDER — TRAMADOL HYDROCHLORIDE 50 MG/1
50 TABLET ORAL EVERY 6 HOURS
Refills: 0 | Status: DISCONTINUED | OUTPATIENT
Start: 2023-04-25 | End: 2023-04-26

## 2023-04-25 RX ADMIN — Medication 30 MILLILITER(S): at 22:19

## 2023-04-25 RX ADMIN — Medication 270 MILLIGRAM(S): at 17:47

## 2023-04-25 RX ADMIN — Medication 5 MILLIGRAM(S): at 09:29

## 2023-04-25 RX ADMIN — Medication 15 MILLIGRAM(S): at 17:48

## 2023-04-25 RX ADMIN — Medication 270 MILLIGRAM(S): at 06:28

## 2023-04-25 RX ADMIN — Medication 675 MILLIGRAM(S): at 22:34

## 2023-04-25 RX ADMIN — Medication 675 MILLIGRAM(S): at 06:40

## 2023-04-25 RX ADMIN — HYDROMORPHONE HYDROCHLORIDE 0.5 MILLIGRAM(S): 2 INJECTION INTRAMUSCULAR; INTRAVENOUS; SUBCUTANEOUS at 19:34

## 2023-04-25 RX ADMIN — HYDROMORPHONE HYDROCHLORIDE 0.5 MILLIGRAM(S): 2 INJECTION INTRAMUSCULAR; INTRAVENOUS; SUBCUTANEOUS at 19:19

## 2023-04-25 RX ADMIN — Medication 15 MILLIGRAM(S): at 18:00

## 2023-04-25 RX ADMIN — Medication 15 MILLIGRAM(S): at 11:49

## 2023-04-25 RX ADMIN — PANTOPRAZOLE SODIUM 40 MILLIGRAM(S): 20 TABLET, DELAYED RELEASE ORAL at 11:49

## 2023-04-25 RX ADMIN — Medication 5 MILLIGRAM(S): at 16:24

## 2023-04-25 RX ADMIN — ENOXAPARIN SODIUM 40 MILLIGRAM(S): 100 INJECTION SUBCUTANEOUS at 11:49

## 2023-04-25 RX ADMIN — Medication 675 MILLIGRAM(S): at 00:10

## 2023-04-25 RX ADMIN — Medication 270 MILLIGRAM(S): at 22:19

## 2023-04-25 NOTE — PROGRESS NOTE ADULT - PROBLEM SELECTOR PLAN 1
s/p dilation. ureteral catheter removed. bunn removal as per Dr. Smith.
POD 5  Continue clears  OOB  dvt/pe prophylaxis   Fu labs  Ofirmev for pain  will discuss with surgeon

## 2023-04-25 NOTE — PROGRESS NOTE ADULT - NUTRITIONAL ASSESSMENT
This patient has been assessed with a concern for Malnutrition and has been determined to have a diagnosis/diagnoses of Moderate protein-calorie malnutrition and Underweight (BMI < 19).    This patient is being managed with:   Diet Clear Liquid-  Ensure Clear Cans or Servings Per Day:  1       Frequency:  Three Times a day     Special Instructions for Nursing:  To include 1 Ensure clear 1 Lemon Ice 2 cups of hot water to make tea or broth.  Caffeinated coffee is permissible  Entered: Apr 23 2023 10:34AM  
This patient has been assessed with a concern for Malnutrition and has been determined to have a diagnosis/diagnoses of Moderate protein-calorie malnutrition and Underweight (BMI < 19).    This patient is being managed with:   Diet Clear Liquid-  Ensure Clear Cans or Servings Per Day:  1       Frequency:  Three Times a day     Special Instructions for Nursing:  To include 1 Ensure clear 1 Lemon Ice 2 cups of hot water to make tea or broth.  Caffeinated coffee is permissible  Entered: Apr 20 2023  7:46PM  
This patient has been assessed with a concern for Malnutrition and has been determined to have a diagnosis/diagnoses of Moderate protein-calorie malnutrition and Underweight (BMI < 19).    This patient is being managed with:   Diet Clear Liquid-  Ensure Clear Cans or Servings Per Day:  1       Frequency:  Three Times a day     Special Instructions for Nursing:  To include 1 Ensure clear 1 Lemon Ice 2 cups of hot water to make tea or broth.  Caffeinated coffee is permissible  Entered: Apr 23 2023 10:34AM  
This patient has been assessed with a concern for Malnutrition and has been determined to have a diagnosis/diagnoses of Moderate protein-calorie malnutrition and Underweight (BMI < 19).    This patient is being managed with:   Diet Clear Liquid-  Ensure Clear Cans or Servings Per Day:  1       Frequency:  Three Times a day     Special Instructions for Nursing:  To include 1 Ensure clear 1 Lemon Ice 2 cups of hot water to make tea or broth.  Caffeinated coffee is permissible  Entered: Apr 20 2023  7:46PM  
This patient has been assessed with a concern for Malnutrition and has been determined to have a diagnosis/diagnoses of Moderate protein-calorie malnutrition and Underweight (BMI < 19).    This patient is being managed with:   Diet Clear Liquid-  Ensure Clear Cans or Servings Per Day:  1       Frequency:  Three Times a day     Special Instructions for Nursing:  To include 1 Ensure clear 1 Lemon Ice 2 cups of hot water to make tea or broth.  Caffeinated coffee is permissible  Entered: Apr 23 2023 10:34AM  
This patient has been assessed with a concern for Malnutrition and has been determined to have a diagnosis/diagnoses of Moderate protein-calorie malnutrition and Underweight (BMI < 19).    This patient is being managed with:   Diet Clear Liquid-  Ensure Clear Cans or Servings Per Day:  1       Frequency:  Three Times a day     Special Instructions for Nursing:  To include 1 Ensure clear 1 Lemon Ice 2 cups of hot water to make tea or broth.  Caffeinated coffee is permissible  Entered: Apr 23 2023 10:34AM

## 2023-04-26 ENCOUNTER — NON-APPOINTMENT (OUTPATIENT)
Age: 43
End: 2023-04-26

## 2023-04-26 ENCOUNTER — TRANSCRIPTION ENCOUNTER (OUTPATIENT)
Age: 43
End: 2023-04-26

## 2023-04-26 VITALS
SYSTOLIC BLOOD PRESSURE: 127 MMHG | RESPIRATION RATE: 17 BRPM | DIASTOLIC BLOOD PRESSURE: 85 MMHG | OXYGEN SATURATION: 98 % | HEART RATE: 69 BPM | TEMPERATURE: 98 F

## 2023-04-26 LAB
ANION GAP SERPL CALC-SCNC: 10 MMOL/L — SIGNIFICANT CHANGE UP (ref 5–17)
ANION GAP SERPL CALC-SCNC: 9 MMOL/L — SIGNIFICANT CHANGE UP (ref 5–17)
BUN SERPL-MCNC: 8 MG/DL — SIGNIFICANT CHANGE UP (ref 7–23)
BUN SERPL-MCNC: 9 MG/DL — SIGNIFICANT CHANGE UP (ref 7–23)
CALCIUM SERPL-MCNC: 8.4 MG/DL — SIGNIFICANT CHANGE UP (ref 8.4–10.5)
CALCIUM SERPL-MCNC: 8.5 MG/DL — SIGNIFICANT CHANGE UP (ref 8.4–10.5)
CHLORIDE SERPL-SCNC: 102 MMOL/L — SIGNIFICANT CHANGE UP (ref 96–108)
CHLORIDE SERPL-SCNC: 102 MMOL/L — SIGNIFICANT CHANGE UP (ref 96–108)
CO2 SERPL-SCNC: 25 MMOL/L — SIGNIFICANT CHANGE UP (ref 22–31)
CO2 SERPL-SCNC: 26 MMOL/L — SIGNIFICANT CHANGE UP (ref 22–31)
CREAT SERPL-MCNC: 0.39 MG/DL — LOW (ref 0.5–1.3)
CREAT SERPL-MCNC: 0.42 MG/DL — LOW (ref 0.5–1.3)
EGFR: 126 ML/MIN/1.73M2 — SIGNIFICANT CHANGE UP
EGFR: 128 ML/MIN/1.73M2 — SIGNIFICANT CHANGE UP
GLUCOSE SERPL-MCNC: 100 MG/DL — HIGH (ref 70–99)
GLUCOSE SERPL-MCNC: 93 MG/DL — SIGNIFICANT CHANGE UP (ref 70–99)
HCT VFR BLD CALC: 27.5 % — LOW (ref 34.5–45)
HGB BLD-MCNC: 8.5 G/DL — LOW (ref 11.5–15.5)
MAGNESIUM SERPL-MCNC: 1.7 MG/DL — SIGNIFICANT CHANGE UP (ref 1.6–2.6)
MCHC RBC-ENTMCNC: 22.7 PG — LOW (ref 27–34)
MCHC RBC-ENTMCNC: 30.9 GM/DL — LOW (ref 32–36)
MCV RBC AUTO: 73.3 FL — LOW (ref 80–100)
NRBC # BLD: 0 /100 WBCS — SIGNIFICANT CHANGE UP (ref 0–0)
PLATELET # BLD AUTO: 330 K/UL — SIGNIFICANT CHANGE UP (ref 150–400)
POTASSIUM SERPL-MCNC: 3 MMOL/L — LOW (ref 3.5–5.3)
POTASSIUM SERPL-MCNC: 3.3 MMOL/L — LOW (ref 3.5–5.3)
POTASSIUM SERPL-SCNC: 3 MMOL/L — LOW (ref 3.5–5.3)
POTASSIUM SERPL-SCNC: 3.3 MMOL/L — LOW (ref 3.5–5.3)
RBC # BLD: 3.75 M/UL — LOW (ref 3.8–5.2)
RBC # FLD: 18.3 % — HIGH (ref 10.3–14.5)
SODIUM SERPL-SCNC: 137 MMOL/L — SIGNIFICANT CHANGE UP (ref 135–145)
SODIUM SERPL-SCNC: 137 MMOL/L — SIGNIFICANT CHANGE UP (ref 135–145)
SURGICAL PATHOLOGY STUDY: SIGNIFICANT CHANGE UP
WBC # BLD: 7.36 K/UL — SIGNIFICANT CHANGE UP (ref 3.8–10.5)
WBC # FLD AUTO: 7.36 K/UL — SIGNIFICANT CHANGE UP (ref 3.8–10.5)

## 2023-04-26 PROCEDURE — 97161 PT EVAL LOW COMPLEX 20 MIN: CPT

## 2023-04-26 PROCEDURE — 84100 ASSAY OF PHOSPHORUS: CPT

## 2023-04-26 PROCEDURE — P9016: CPT

## 2023-04-26 PROCEDURE — 88307 TISSUE EXAM BY PATHOLOGIST: CPT

## 2023-04-26 PROCEDURE — 80048 BASIC METABOLIC PNL TOTAL CA: CPT

## 2023-04-26 PROCEDURE — 88305 TISSUE EXAM BY PATHOLOGIST: CPT

## 2023-04-26 PROCEDURE — C9399: CPT

## 2023-04-26 PROCEDURE — 83735 ASSAY OF MAGNESIUM: CPT

## 2023-04-26 PROCEDURE — 82962 GLUCOSE BLOOD TEST: CPT

## 2023-04-26 PROCEDURE — 88341 IMHCHEM/IMCYTCHM EA ADD ANTB: CPT

## 2023-04-26 PROCEDURE — 36415 COLL VENOUS BLD VENIPUNCTURE: CPT

## 2023-04-26 PROCEDURE — 85027 COMPLETE CBC AUTOMATED: CPT

## 2023-04-26 PROCEDURE — 74019 RADEX ABDOMEN 2 VIEWS: CPT

## 2023-04-26 PROCEDURE — C1758: CPT

## 2023-04-26 PROCEDURE — 88342 IMHCHEM/IMCYTCHM 1ST ANTB: CPT

## 2023-04-26 PROCEDURE — 36430 TRANSFUSION BLD/BLD COMPNT: CPT

## 2023-04-26 PROCEDURE — 93005 ELECTROCARDIOGRAM TRACING: CPT

## 2023-04-26 PROCEDURE — 88309 TISSUE EXAM BY PATHOLOGIST: CPT

## 2023-04-26 RX ORDER — TRAMADOL HYDROCHLORIDE 50 MG/1
1 TABLET ORAL
Qty: 16 | Refills: 0
Start: 2023-04-26 | End: 2023-04-29

## 2023-04-26 RX ORDER — TRAMADOL HYDROCHLORIDE 50 MG/1
0.5 TABLET ORAL
Qty: 0 | Refills: 0 | DISCHARGE
Start: 2023-04-26

## 2023-04-26 RX ORDER — POTASSIUM CHLORIDE 20 MEQ
10 PACKET (EA) ORAL
Refills: 0 | Status: COMPLETED | OUTPATIENT
Start: 2023-04-26 | End: 2023-04-26

## 2023-04-26 RX ORDER — TRAMADOL HYDROCHLORIDE 50 MG/1
1 TABLET ORAL
Qty: 0 | Refills: 0 | DISCHARGE
Start: 2023-04-26

## 2023-04-26 RX ORDER — POTASSIUM CHLORIDE 20 MEQ
1 PACKET (EA) ORAL
Qty: 3 | Refills: 0
Start: 2023-04-26 | End: 2023-04-28

## 2023-04-26 RX ADMIN — Medication 30 MILLILITER(S): at 18:04

## 2023-04-26 RX ADMIN — Medication 1 TABLET(S): at 18:04

## 2023-04-26 RX ADMIN — SODIUM CHLORIDE 100 MILLILITER(S): 9 INJECTION, SOLUTION INTRAVENOUS at 00:37

## 2023-04-26 RX ADMIN — Medication 975 MILLIGRAM(S): at 00:52

## 2023-04-26 RX ADMIN — Medication 15 MILLIGRAM(S): at 00:37

## 2023-04-26 RX ADMIN — Medication 975 MILLIGRAM(S): at 12:39

## 2023-04-26 RX ADMIN — Medication 15 MILLIGRAM(S): at 00:52

## 2023-04-26 RX ADMIN — Medication 975 MILLIGRAM(S): at 13:10

## 2023-04-26 RX ADMIN — Medication 975 MILLIGRAM(S): at 00:37

## 2023-04-26 RX ADMIN — Medication 1 TABLET(S): at 08:30

## 2023-04-26 RX ADMIN — Medication 5 MILLIGRAM(S): at 12:39

## 2023-04-26 RX ADMIN — Medication 30 MILLILITER(S): at 08:29

## 2023-04-26 RX ADMIN — Medication 975 MILLIGRAM(S): at 18:16

## 2023-04-26 RX ADMIN — Medication 100 MILLIEQUIVALENT(S): at 12:51

## 2023-04-26 RX ADMIN — Medication 975 MILLIGRAM(S): at 06:58

## 2023-04-26 RX ADMIN — PANTOPRAZOLE SODIUM 40 MILLIGRAM(S): 20 TABLET, DELAYED RELEASE ORAL at 12:39

## 2023-04-26 RX ADMIN — Medication 975 MILLIGRAM(S): at 18:04

## 2023-04-26 RX ADMIN — Medication 15 MILLIGRAM(S): at 06:42

## 2023-04-26 RX ADMIN — TRAMADOL HYDROCHLORIDE 25 MILLIGRAM(S): 50 TABLET ORAL at 10:30

## 2023-04-26 RX ADMIN — Medication 30 MILLILITER(S): at 03:38

## 2023-04-26 RX ADMIN — Medication 975 MILLIGRAM(S): at 06:43

## 2023-04-26 RX ADMIN — Medication 100 MILLIEQUIVALENT(S): at 15:12

## 2023-04-26 RX ADMIN — Medication 5 MILLIGRAM(S): at 06:42

## 2023-04-26 RX ADMIN — Medication 15 MILLIGRAM(S): at 18:04

## 2023-04-26 RX ADMIN — Medication 15 MILLIGRAM(S): at 06:58

## 2023-04-26 RX ADMIN — Medication 30 MILLILITER(S): at 12:40

## 2023-04-26 RX ADMIN — Medication 100 MILLIEQUIVALENT(S): at 13:58

## 2023-04-26 RX ADMIN — Medication 5 MILLIGRAM(S): at 00:34

## 2023-04-26 RX ADMIN — TRAMADOL HYDROCHLORIDE 25 MILLIGRAM(S): 50 TABLET ORAL at 11:00

## 2023-04-26 NOTE — DISCHARGE NOTE PROVIDER - NSDCMRMEDTOKEN_GEN_ALL_CORE_FT
acetaminophen 325 mg oral tablet: 2 tab(s) orally every 6 hours as needed for  moderate pain  MiraLax oral powder for reconstitution: 17 gram(s) orally once a day  traMADol 50 mg oral tablet: 0.5 tab(s) orally every 4 hours As needed Moderate Pain (4 - 6)  traMADol 50 mg oral tablet: 1 tab(s) orally every 6 hours As needed Severe Pain (7 - 10)   acetaminophen 325 mg oral tablet: 2 tab(s) orally every 6 hours as needed for  moderate pain  MiraLax oral powder for reconstitution: 17 gram(s) orally once a day  traMADol 50 mg oral tablet: 1 tab(s) orally every 6 hours As needed Severe Pain (7 - 10)   acetaminophen 325 mg oral tablet: 2 tab(s) orally every 6 hours as needed for  moderate pain  MiraLax oral powder for reconstitution: 17 gram(s) orally once a day  Potassium Chloride (Eqv-Klor-Con 10) 10 mEq oral tablet, extended release: 1 tab(s) orally once a day as needed for hypokalemia take over 3 days  - follow up blood work in one week MDD: 1  traMADol 50 mg oral tablet: 1 tab(s) orally every 6 hours As needed Severe Pain (7 - 10)  traMADol 50 mg oral tablet: 1 tab(s) orally every 6 hours as needed for  severe pain MDD: 4

## 2023-04-26 NOTE — PROGRESS NOTE ADULT - REASON FOR ADMISSION
Colon mass hematochezia Pelvic mass TOA
Obstructing tumor of sigmoid colon
colon surgery
colon surgery
surgery for colon obstruction
Colon mass
Colon mass

## 2023-04-26 NOTE — DISCHARGE NOTE PROVIDER - NSDCCPTREATMENT_GEN_ALL_CORE_FT
PRINCIPAL PROCEDURE  Procedure: Laparoscopy, with sigmoid colectomy  Findings and Treatment: mini laparotomy for colon  resection and left salpingoopherectomy

## 2023-04-26 NOTE — DISCHARGE NOTE PROVIDER - CARE PROVIDER_API CALL
Benson Smith)  ColonRectal Surgery; Surgery  [address], [address]  [place], [place] [id number]  Phone: [phone]  Fax: [phone]  Follow Up Time: 1 week

## 2023-04-26 NOTE — PROGRESS NOTE ADULT - SUBJECTIVE AND OBJECTIVE BOX
Called by RN , for pt with severe Abd pain / Nausea  pt seen in bed in [facility], Pain is located mid abd , 9/10 , no radiation ,   mild nausea,   pt POD 0 , S/p Left salpingoophorectomy , Laparoscopic partial left colectomy with mini laparotomy , Cystoscopy with insertion of bilateral ureteral stents for laparoscopic sigmoid colectomy       acetaminophen   IVPB .. 1000 milliGRAM(s) IV Intermittent every 6 hours  cefoTEtan  IVPB 2 Gram(s) IV Intermittent every 12 hours  diazepam    Tablet 2 milliGRAM(s) Oral every 6 hours PRN  HYDROmorphone   Tablet 2 milliGRAM(s) Oral every 4 hours PRN  HYDROmorphone  Injectable 0.5 milliGRAM(s) IV Push every 4 hours PRN  ketorolac   Injectable 15 milliGRAM(s) IV Push every 6 hours  lactated ringers. 1000 milliLiter(s) IV Continuous <Continuous>  ondansetron Injectable 4 milliGRAM(s) IV Push every 6 hours PRN  sodium chloride 0.9% Bolus 500 milliLiter(s) IV Bolus once                            7.7    13.68 )-----------( 383      ( 20 Apr 2023 14:52 )             25.2       Hemoglobin: 7.7 g/dL (04-20 @ 14:52)      04-20    140  |  103  |  9   ----------------------------<  144<H>  3.6   |  26  |  0.69    Ca    8.8      20 Apr 2023 14:52  Phos  3.3     04-20  Mg     1.5     04-20      Creatinine Trend: 0.69<--, 0.64<--, 0.62<--    COAGS:           T(C): 36.8 (04-20-23 @ 19:30), Max: 37 (04-20-23 @ 13:15)  HR: 77 (04-20-23 @ 19:30) (77 - 99)  BP: 101/72 (04-20-23 @ 19:30) (94/61 - 114/69)  RR: 16 (04-20-23 @ 19:30) (13 - 18)  SpO2: 99% (04-20-23 @ 19:30) (98% - 100%)  Wt(kg): --    I&O's Summary    20 Apr 2023 07:01  -  20 Apr 2023 20:34  --------------------------------------------------------  IN: 875 mL / OUT: 270 mL / NET: 605 mL    42 yr female hx anemia, tubo-ovarian abscess, colonic polyps , now pt POD 0 , S/p Left salpingoophorectomy , Laparoscopic partial left colectomy with mini laparotomy , Cystoscopy with insertion of bilateral ureteral stents for laparoscopic sigmoid colectomy     - cont IV hydration   - serial labs. trend H/h  - antiemetics prn   - pain management , presently on dilaudid prn / toradol ATC,/ acetaminophen q 6 hr ,   - will offer Valium for +/- spasms   - incentive spir/ splinting with abd pillow   -  GI / DVT prophylaxis ( PAS stocking )   -  keep K>4, mag >2.0   - strict I/o   - post op ABX per protocol   d/W Nursing  - will monit
F/U Note:    42y Female admitted POD #5 from Left salpingoophorectomy and Laparoscopic partial left colectomy, ureteral stents        Vital Signs Last 24 Hrs  T(C): 37.3 (25 Apr 2023 18:12), Max: 37.3 (25 Apr 2023 18:12)  T(F): 99.1 (25 Apr 2023 18:12), Max: 99.1 (25 Apr 2023 18:12)  HR: 73 (25 Apr 2023 18:12) (73 - 81)  BP: 116/72 (25 Apr 2023 18:12) (113/73 - 116/76)  BP(mean): --  RR: 14 (25 Apr 2023 18:12) (14 - 17)  SpO2: 96% (25 Apr 2023 18:12) (96% - 98%)    Parameters below as of 25 Apr 2023 18:12  Patient On (Oxygen Delivery Method): room air                                9.7    8.81  )-----------( 427      ( 25 Apr 2023 09:15 )             31.0         04-25    137  |  102  |  12  ----------------------------<  94  3.8   |  24  |  0.52    Ca    8.8      25 Apr 2023 09:15  Mg     1.8     04-25          PLAN:    -serial exams, any changes in exam to be escelated to surgical team immedietly  -pain control  -finished post op anbx  -IVFs until taking adequate PO  -advance diet per surgical team  -dressing changes per surgical team  -DVT prophylaxis  -AM labs           
POST-OPERATIVE NOTE    Subjective:  Patient is s/p sigmoid colon resection and left salpingoophorectomy . Recovering appropriately.     Vital Signs Last 24 Hrs  T(C): 36.1 (21 Apr 2023 05:30), Max: 36.8 (20 Apr 2023 19:30)  T(F): 97 (21 Apr 2023 05:30), Max: 98.2 (20 Apr 2023 19:30)  HR: 79 (21 Apr 2023 13:48) (77 - 90)  BP: 99/65 (21 Apr 2023 13:48) (89/55 - 114/69)  BP(mean): --  RR: 17 (21 Apr 2023 13:21) (13 - 18)  SpO2: 100% (21 Apr 2023 13:21) (98% - 100%)    Parameters below as of 21 Apr 2023 13:21  Patient On (Oxygen Delivery Method): room air      I&O's Detail    20 Apr 2023 07:01  -  21 Apr 2023 07:00  --------------------------------------------------------  IN:    Lactated Ringers: 1100 mL    Lactated Ringers: 375 mL    Sodium Chloride 0.9% Bolus: 500 mL  Total IN: 1975 mL    OUT:    Indwelling Catheter - Urethral (mL): 1225 mL    Ureteral Catheter (mL): 220 mL  Total OUT: 1445 mL    Total NET: 530 mL      21 Apr 2023 07:01  -  21 Apr 2023 14:44  --------------------------------------------------------  IN:  Total IN: 0 mL    OUT:    Indwelling Catheter - Urethral (mL): 1500 mL  Total OUT: 1500 mL    Total NET: -1500 mL        enoxaparin Injectable 40    PAST MEDICAL & SURGICAL HISTORY:  External hemorrhoids      External hemorrhoid, bleeding      TOA (tubo-ovarian abscess)      Colonic polyp      TOA (tubo-ovarian abscess)      S/P excision of ganglion cyst            Physical Exam:  General: NAD, resting comfortably in bed  Abdomen soft dressing dry and intact  Moderate incisional tenderness  Holm in place Clear yellow urine Adequate output  Ureteral catheters removed  No vaginal bleeding      LABS:                        6.7    15.04 )-----------( 369      ( 21 Apr 2023 06:40 )             22.3     04-21    142  |  107  |  5<L>  ----------------------------<  91  4.0   |  29  |  0.59    Ca    8.8      21 Apr 2023 06:40  Phos  3.3     04-20  Mg     1.5     04-20                  Radiology and Additional Studies:    Assessment:  The patient is a 42y Female who is now 24 hours post-op Doing well Chronic anemia secondary to hematochezia    Plan:  - transfuse 1 unit PRBC's  - Pain control as needed  - DVT ppx  - OOB and ambulating as tolerated  - F/u AM labs
Patient is a 42y old  Female who presents with a chief complaint of left colectomy, possible colostomy, possible ileostomy, laparoscopy (12 Apr 2023 14:14)    HPI:  41 y/o female accompanied by sister presents for PST.  Patient was hospitalized 03/03/2023-03/09/2023 for TOA - required IV antibiotics and completed therapy at home.  After completion of Abx patient developed vaginal yeast infection and BV- treatment also has been completed.   Patient also aware of her current anemia,  did not received a transfusion ever and is now aware that there is a high possibility she will need a transfusion with during this procedure.  Still with some mild intermittent LLQ abdominal pain managed with Tylenol.   Coloscopy done after hospitalization and patient now dx with colonic polyp and requires upcoming procedure.  Scheduled for left colectomy, possible colostomy, possible ileostomy, laparoscopy, laparotomy with Dr Smith on 04/20/2023.  COVID-19 pcr testing information provided.    (12 Apr 2023 14:14)    s/p cystoscopy catheters, urethral dilation    Interval Events:  Patient seen and examined at bedside.    MEDICATIONS:  MEDICATIONS  (STANDING):  acetaminophen   IVPB .. 1000 milliGRAM(s) IV Intermittent every 6 hours  enoxaparin Injectable 40 milliGRAM(s) SubCutaneous every 24 hours  ketorolac   Injectable 15 milliGRAM(s) IV Push every 6 hours  lactated ringers. 1000 milliLiter(s) (100 mL/Hr) IV Continuous <Continuous>  lactobacillus acidophilus 1 Tablet(s) Oral two times a day with meals  sodium chloride 0.9% Bolus 500 milliLiter(s) IV Bolus once    MEDICATIONS  (PRN):  benzocaine/menthol Lozenge 1 Lozenge Oral every 2 hours PRN Sore Throat  diazepam    Tablet 2 milliGRAM(s) Oral every 6 hours PRN muscle spasm  HYDROmorphone   Tablet 2 milliGRAM(s) Oral every 4 hours PRN Moderate Pain (4 - 6)  HYDROmorphone  Injectable 0.5 milliGRAM(s) IV Push every 4 hours PRN Severe Pain (7 - 10)  ondansetron Injectable 4 milliGRAM(s) IV Push every 6 hours PRN Nausea and/or Vomiting      Allergies    oxycodone (Pruritus)    Intolerances        T(C): 36.1 (04-21-23 @ 05:30), Max: 37 (04-20-23 @ 13:15)  T(F): 97 (04-21-23 @ 05:30), Max: 98.6 (04-20-23 @ 13:15)  HR: 80 (04-21-23 @ 05:30) (77 - 99)  BP: 99/66 (04-21-23 @ 06:45) (89/55 - 114/69)  RR: 15 (04-20-23 @ 21:15) (13 - 18)  SpO2: 100% (04-21-23 @ 05:30) (98% - 100%)          04-20-23 @ 07:01  -  04-21-23 @ 07:00  --------------------------------------------------------  IN:  Total IN: 0 mL    OUT:    Indwelling Catheter - Urethral (mL): 1225 mL    Ureteral Catheter (mL): 220 mL  Total OUT: 1445 mL    Total NET: -1445 mL          LABS:      CBC Full  -  ( 20 Apr 2023 14:52 )  WBC Count : 13.68 K/uL  RBC Count : 3.46 M/uL  Hemoglobin : 7.7 g/dL  Hematocrit : 25.2 %  Platelet Count - Automated : 383 K/uL  Mean Cell Volume : 72.8 fl  Mean Cell Hemoglobin : 22.3 pg  Mean Cell Hemoglobin Concentration : 30.6 gm/dL  Auto Neutrophil # : x  Auto Lymphocyte # : x  Auto Monocyte # : x  Auto Eosinophil # : x  Auto Basophil # : x  Auto Neutrophil % : x  Auto Lymphocyte % : x  Auto Monocyte % : x  Auto Eosinophil % : x  Auto Basophil % : x    04-21    142  |  107  |  5<L>  ----------------------------<  91  4.0   |  29  |  0.59    Ca    8.8      21 Apr 2023 06:40  Phos  3.3     04-20  Mg     1.5     04-20                    Physical Exam    Constitutional: alert, no acute distress    Abdomen: soft, nontender, nondistended, no HSM    Genitourinary: no bladder distention, bunn yellow          
SURGERY PROGRESS NOTE  Pt. seen and examined at bedside, pt c/o continued abdominal pain. Pt admits to vomiting overnight x1, bilious non bloody. Admits to continued nausea since starting FLD yesterday. Ordered for Zofran, pt prefers Reglan. Denies flatus or BM.   Denies fever/chills, chest pain, dyspnea, cough, dizziness.     Vital Signs Last 24 Hrs  T(C): 37.1 (23 Apr 2023 05:35), Max: 37.1 (23 Apr 2023 05:35)  T(F): 98.7 (23 Apr 2023 05:35), Max: 98.7 (23 Apr 2023 05:35)  HR: 87 (23 Apr 2023 05:35) (83 - 99)  BP: 110/73 (23 Apr 2023 05:35) (110/73 - 119/79)  BP(mean): --  RR: 16 (23 Apr 2023 05:35) (16 - 16)  SpO2: 97% (23 Apr 2023 05:35) (96% - 97%)    Parameters below as of 23 Apr 2023 05:35  Patient On (Oxygen Delivery Method): room air    PHYSICAL EXAM:    GENERAL: NAD  HEAD:  Atraumatic, Normocephalic  EYES: EOMI, PERRLA, conjunctiva and sclera clear  ABDOMEN: Distended but soft, TTP throughout, surgical incisions are c/d/i, no erythema present.   EXTREMITIES:  calf soft, non tender b/l    I&O's Detail      LABS:                        8.5    9.25  )-----------( 396      ( 23 Apr 2023 06:00 )             27.4     04-23    140  |  104  |  8   ----------------------------<  100<H>  3.3<L>   |  30  |  0.56    Ca    8.4      23 Apr 2023 06:00      Impression: 42 year old female with newly diagnosed colon mass is S/P Laparoscopy , mini laparotomy, sigmoid resection, left salpingo - oopherectomy POD #3. Pt has continued post-surgical pain. Vomited x5 overnight after FLD, c/o nausea.  Hgb stable after transfusion 2 days ago. wbc wnl. Voiding w/o issue. Pt HD stable.     Plan:  - Return to [facility] until N/V improve  - C/w IVF LR @100  - Pain regimen, IV tylenol/Dilaudid prn  - Change zofran to reglan prn  - K 3.3, replete w/ KCL IV  - Monitor for return of BF    Discussed w/ Dr. Smith  Surgery  - 
POD #3    Remains afebrile with normal WBC  Vomited several times last night - no longer nauseated  Has not passed any flatus or stool  Wound clean  Abdomen softly distended    IMPRESSION: Ileus    PLAN: Clear fluid diet for now           Advance diet when passes flatus and there is no further nausea or vomiting  
SURGERY PROGRESS NOTE  Pt. seen and examined at bedside, pt c/o continued abdominal pain. Pt admits to vomiting overnight x1, nausea slightly improved this morning. Admits to noticing some red clots in toilet this morning passed per rectum. Denies flatus or BM. Denies fever/chills, chest pain, dyspnea, cough, dizziness.     Vital Signs Last 24 Hrs  T(C): 36.9 (24 Apr 2023 06:11), Max: 36.9 (24 Apr 2023 06:11)  T(F): 98.4 (24 Apr 2023 06:11), Max: 98.4 (24 Apr 2023 06:11)  HR: 78 (24 Apr 2023 06:11) (78 - 86)  BP: 123/78 (24 Apr 2023 06:11) (101/67 - 123/78)  BP(mean): --  RR: 16 (24 Apr 2023 06:11) (16 - 16)  SpO2: 98% (24 Apr 2023 06:11) (97% - 98%)    Parameters below as of 24 Apr 2023 06:11  Patient On (Oxygen Delivery Method): room air    PHYSICAL EXAM:    GENERAL: NAD  HEAD:  Atraumatic, Normocephalic  EYES: EOMI, PERRLA, conjunctiva and sclera clear  ABDOMEN: Distended but soft, TTP throughout, surgical incisions are c/d/i, no erythema present.   EXTREMITIES:  calf soft, non tender b/l    I&O's Detail    23 Apr 2023 07:01  -  24 Apr 2023 07:00  --------------------------------------------------------  IN:    Lactated Ringers: 1200 mL  Total IN: 1200 mL    OUT:  Total OUT: 0 mL    Total NET: 1200 mL    LABS:                        8.7    8.22  )-----------( 413      ( 24 Apr 2023 07:00 )             28.2     04-24    139  |  104  |  8   ----------------------------<  96  3.4<L>   |  25  |  0.36<L>    Ca    8.5      24 Apr 2023 07:00      Impression: 42 year old female with newly diagnosed colon mass is S/P Laparoscopy , mini laparotomy, sigmoid resection, left salpingo - oopherectomy POD #4. Pt has continued post-surgical pain. Vomited overnight, nausea slightly improved. Hgb stable. wbc wnl. Voiding w/o issue. Pt HD stable.     Plan:  - C/w CLD until N/V improved  - C/w IVF LR @100  - F/u Abd XRAY, evaluate for ileus  - Monitor clots per rectum, cont trend H/H. Stable this AM.   - C/w pain regimen  - Reglan for nausea    Discussed w/ Dr. Smith  Surgery
POD #2    Remains afebrile with normal WBC  H/H stable after transfusion (8.3/27)  Wounds clean  Abdomen slightly distended  No flatus yet  Tolerating PO fluids  Main problem is pain control    PLAN: D/C Holm             Advance diet as tolerated            OOB
Patient is a 42y old  Female who presents with a chief complaint of colon mass  (21 Apr 2023 07:46) post op Day 1  S/P Laparoscopy/mini laparotomy /Sigmoid resection / left Salpingo -ooperectomy     Patient seen and examined at bedside.  Patient is lying in bed.   Her pain is managed this am with Dilaudid , tylenol, toradol.   She has hx of anemia and this am has a critical value requiring transfusion 1 unit PRBC s.      She denies any CP, SOB,  or dizziness .   She is tolerating clear liquids without   nausea or vomiting but denies any flatus.     ALLERGIES:  oxycodone (Pruritus)    MEDICATIONS  (STANDING):  acetaminophen   IVPB .. 1000 milliGRAM(s) IV Intermittent every 6 hours  enoxaparin Injectable 40 milliGRAM(s) SubCutaneous every 24 hours  ketorolac   Injectable 15 milliGRAM(s) IV Push every 6 hours  lactated ringers. 1000 milliLiter(s) (100 mL/Hr) IV Continuous <Continuous>  lactobacillus acidophilus 1 Tablet(s) Oral two times a day with meals  sodium chloride 0.9% Bolus 500 milliLiter(s) IV Bolus once    MEDICATIONS  (PRN):  benzocaine/menthol Lozenge 1 Lozenge Oral every 2 hours PRN Sore Throat  diazepam    Tablet 2 milliGRAM(s) Oral every 6 hours PRN muscle spasm  HYDROmorphone   Tablet 2 milliGRAM(s) Oral every 4 hours PRN Moderate Pain (4 - 6)  HYDROmorphone  Injectable 0.5 milliGRAM(s) IV Push every 4 hours PRN Severe Pain (7 - 10)  ondansetron Injectable 4 milliGRAM(s) IV Push every 6 hours PRN Nausea and/or Vomiting    Vital Signs Last 24 Hrs  T(F): 97 (21 Apr 2023 05:30), Max: 98.2 (20 Apr 2023 19:30)  HR: 79 (21 Apr 2023 13:48) (77 - 90)  BP: 99/65 (21 Apr 2023 13:48) (89/55 - 114/69)  RR: 17 (21 Apr 2023 13:21) (13 - 18)  SpO2: 100% (21 Apr 2023 13:21) (98% - 100%)  I&O's Summary    20 Apr 2023 07:01  -  21 Apr 2023 07:00  --------------------------------------------------------  IN: 1975 mL / OUT: 1445 mL / NET: 530 mL    21 Apr 2023 07:01  -  21 Apr 2023 14:22  --------------------------------------------------------  IN: 0 mL / OUT: 1500 mL / NET: -1500 mL      PHYSICAL EXAM:  General: NAD, A/O x 3  HEAD :  normocephalic , EOM's intact, PERRLA ,   Neck: Supple, No JVD  Lungs: Clear to auscultation bilaterally  Cardio: RRR, S1/S2, No murmurs  Abdomen: Soft, TTT,  softly distended - Flatus , Dressings dry and intact  bunn in place  Extremities: No calf tenderness, No pitting edema    LABS:                        6.7    15.04 )-----------( 369      ( 21 Apr 2023 06:40 )             22.3     04-21    142  |  107  |  5   ----------------------------<  91  4.0   |  29  |  0.59    Ca    8.8      21 Apr 2023 06:40  Phos  3.3     04-20  Mg     1.5     04-20      
S/P Laparoscopy /mini Laparotomy / left colon resection POD #6    Patient was seen and examined by me this am.   Patient states that she is feeling better but she still has not had anything solid to eat.   She states that she does not have an appetite.   She is passing flatus but has not had a BM.   She is no longer having nausea or vomiting.    She has been OOB and her pain is being managed with tylenol and tramadol.  She denies any CP or SOB this am.       ALLERGIES:  oxycodone (Pruritus)    MEDICATIONS  (STANDING):  acetaminophen     Tablet .. 975 milliGRAM(s) Oral every 6 hours  enoxaparin Injectable 40 milliGRAM(s) SubCutaneous every 24 hours  ketorolac   Injectable 15 milliGRAM(s) IV Push every 6 hours  lactated ringers. 1000 milliLiter(s) (100 mL/Hr) IV Continuous <Continuous>  lactobacillus acidophilus 1 Tablet(s) Oral two times a day with meals  pantoprazole  Injectable 40 milliGRAM(s) IV Push daily  sodium chloride 0.9% Bolus 500 milliLiter(s) IV Bolus once    MEDICATIONS  (PRN):  aluminum hydroxide/magnesium hydroxide/simethicone Suspension 30 milliLiter(s) Oral every 4 hours PRN Dyspepsia  benzocaine/menthol Lozenge 1 Lozenge Oral every 2 hours PRN Sore Throat  diazepam Tablet 2 milliGRAM(s) Oral every 6 hours PRN muscle spasm  HYDROmorphone  Injectable 0.5 milliGRAM(s) IV Push every 4 hours PRN Severe Pain (7 - 10)  metoclopramide Injectable 5 milliGRAM(s) IV Push every 6 hours PRN Nausea/vomiting  traMADol 25 milliGRAM(s) Oral every 4 hours PRN Moderate Pain (4 - 6)  traMADol 50 milliGRAM(s) Oral every 6 hours PRN Severe Pain (7 - 10)    Vital Signs Last 24 Hrs  T(F): 98.4 (26 Apr 2023 06:00), Max: 99.1 (25 Apr 2023 18:12)  HR: 75 (26 Apr 2023 06:00) (73 - 76)  BP: 122/79 (26 Apr 2023 06:00) (115/74 - 122/79)  RR: 16 (26 Apr 2023 06:00) (14 - 16)  SpO2: 97% (26 Apr 2023 06:00) (96% - 97%)  I&O's Summary    25 Apr 2023 07:01  -  26 Apr 2023 07:00  --------------------------------------------------------  IN: 2727 mL / OUT: 15 mL / NET: 2712 mL      PHYSICAL EXAM:  General: NAD, A/O x 3  Neck: Supple, No JVD  Lungs: Clear to auscultation bilaterally  Cardio: RRR, S1/S2, No murmurs  Abdomen: Still distended ,  Bowel sounds present, +flatus , incisions clean and dry no erythema or hematomas noted   Extremities: No calf tenderness, No pitting edema    LABS:                        8.5    7.36  )-----------( 330      ( 26 Apr 2023 06:20 )             27.5     04-26    137  |  102  |  9   ----------------------------<  93  3.0   |  25  |  0.39    Ca    8.4      26 Apr 2023 06:20  Mg     1.7     04-26    RADIOLOGY & ADDITIONAL TESTS:    no new xrays     
POD 5, Left salpingoophorectomy, Laparoscopic partial left colectomy with mini laparotomy 4/20, This morning pt admits to pain rated a 3/10, abd bloating, and indigestion. Pt also recalls having loose bowel movement, Denies flatus, chest pain, sob.      PAST MEDICAL & SURGICAL HISTORY:  External hemorrhoids      External hemorrhoid, bleeding      TOA (tubo-ovarian abscess)      Colonic polyp      TOA (tubo-ovarian abscess)      S/P excision of ganglion cyst      MEDICATIONS  (STANDING):  acetaminophen     Tablet .. 975 milliGRAM(s) Oral every 6 hours  enoxaparin Injectable 40 milliGRAM(s) SubCutaneous every 24 hours  lactated ringers. 1000 milliLiter(s) (100 mL/Hr) IV Continuous <Continuous>  lactobacillus acidophilus 1 Tablet(s) Oral two times a day with meals  pantoprazole  Injectable 40 milliGRAM(s) IV Push daily  sodium chloride 0.9% Bolus 500 milliLiter(s) IV Bolus once    MEDICATIONS  (PRN):  benzocaine/menthol Lozenge 1 Lozenge Oral every 2 hours PRN Sore Throat  diazepam    Tablet 2 milliGRAM(s) Oral every 6 hours PRN muscle spasm  HYDROmorphone   Tablet 2 milliGRAM(s) Oral every 4 hours PRN Moderate Pain (4 - 6)  HYDROmorphone  Injectable 0.5 milliGRAM(s) IV Push every 4 hours PRN Severe Pain (7 - 10)  metoclopramide Injectable 5 milliGRAM(s) IV Push every 6 hours PRN Nausea/vomiting    PE: Vital Signs Last 24 Hrs  T(C): 36.7 (25 Apr 2023 05:15), Max: 37.2 (24 Apr 2023 13:28)  T(F): 98.1 (25 Apr 2023 05:15), Max: 98.9 (24 Apr 2023 13:28)  HR: 80 (25 Apr 2023 05:15) (80 - 87)  BP: 116/74 (25 Apr 2023 05:15) (116/74 - 123/79)  BP(mean): --  RR: 17 (25 Apr 2023 05:15) (16 - 17)  SpO2: 98% (25 Apr 2023 05:15) (97% - 98%)    Parameters below as of 25 Apr 2023 05:15  Patient On (Oxygen Delivery Method): room air    Gen: A&O x3 nad  abd: tense, distended, tender throughout, tympanic, no guarding, incisions c/d/i  : voiding   calfs: soft, no swelling, nontender                            8.7    8.22  )-----------( 413      ( 24 Apr 2023 07:00 )             28.2   04-24    139  |  104  |  8   ----------------------------<  96  3.4<L>   |  25  |  0.36<L>    Ca    8.5      24 Apr 2023 07:00

## 2023-04-26 NOTE — PROGRESS NOTE ADULT - ASSESSMENT
43 y/o female hospitalized on  03/03/2023-03/09/2023 for TOA - required IV antibiotics and completed therapy at home.  After completion of Abx patient developed vaginal yeast infection and BV- treatment also has been completed.   During hospitalization, patient was found to have anemia, w/u and found to have colon mass.    Patient is now surgically stable.   She will start a low fiber diet today, and will be planning for discharge either today or tomorrow to home.         -- Hypokalemia    Plan :  OOB             low fiber diet             Replace K             D/c home today if patient tolerated diet             Follow up with Dr Smith in one week      
42 year old female with newly diagnosed colon mass is S/P Laparoscopy , mini laparotomy, sigmoid resection, left salpingo - oopherectomy POD #1     -- anemia of acute blood loss   -- tolerating surgical pain  -- left ureteral stent removed      Plan:  Discussed with Dr Luis COOLEY post transfusion            Transfuse 1 unit PRBC s            hold Lovenox today            Continue bunn till am
POD 5, Left salpingoophorectomy, Laparoscopic partial left colectomy with mini laparotomy 4/20,
s/p cystoscopy
Intraoperative findings with regard to previous TOA and pelvic anatomy discussed with patient and her  in detail

## 2023-04-26 NOTE — DISCHARGE NOTE PROVIDER - NSDCFUADDINST_GEN_ALL_CORE_FT
May shower   Take tylenol for mild pain and tramadol for severe pain  low fiber diet   Incentive spirometer   May shower   Take tylenol for mild pain and tramadol for severe pain, I have also ordered a potassium supplement for 2 days  Then encourage you to eat foods rich in potassium   low fiber diet   Incentive spirometer

## 2023-04-26 NOTE — DISCHARGE NOTE PROVIDER - DETAILS OF MALNUTRITION DIAGNOSIS/DIAGNOSES
This patient has been assessed with a concern for Malnutrition and was treated during this hospitalization for the following Nutrition diagnosis/diagnoses:     -  04/21/2023: Moderate protein-calorie malnutrition   -  04/21/2023: Underweight (BMI < 19)

## 2023-04-26 NOTE — DISCHARGE NOTE PROVIDER - HOSPITAL COURSE
History of Present Illness	  43 y/o female accompanied by sister presented to [facility].  Patient was hospitalized 03/03/2023-03/09/2023 for TOA - required IV antibiotics and completed therapy at home.  After completion of Abx patient developed vaginal yeast infection and BV- treatment also has been completed.   Patient was found to be anemic .    She underwent anemia w/u.   Coloscopy done after hospitalization diagnosed bleeding colon mass .   Scheduled for left colectomy, possible colostomy, possible ileostomy, laparoscopy, laparotomy with Dr Smith on 04/20/2023.  COVID-19 pcr testing information provided.       The patient was admitted and underwent the scheduled procedure.   She is presently surgically stable.  Her post op course was uneventful but she did develop an ileus post op.    She has been OOB, tolerating clear liquids, voiding.  Her diet has been advanced and she will be discharged to home  if tolerating diet .   Dr Smith saw patient this am and cleared for discharge s/p K replacement and if patient tolerates diet History of Present Illness	  41 y/o female accompanied by sister presented to [facility].  Patient was hospitalized 03/03/2023-03/09/2023 for TOA - required IV antibiotics and completed therapy at home.  After completion of Abx patient developed vaginal yeast infection and BV- treatment also has been completed.   Patient was found to be anemic .    She underwent anemia w/u.   Coloscopy done after hospitalization diagnosed bleeding colon mass .   Scheduled for left colectomy, possible colostomy, possible ileostomy, laparoscopy, laparotomy with Dr Smith on 04/20/2023.  COVID-19 pcr testing information provided.       The patient was admitted and underwent the scheduled procedure.   She is presently surgically stable.  Her post op course was uneventful but she did develop an ileus post op.    She has been OOB, tolerating clear liquids, voiding.  Her diet has been advanced and she is tolerating a low fiber diet.   .   Dr Smith saw patient this am and cleared for discharge s/p K replacement and if patient tolerates diet .

## 2023-04-26 NOTE — DISCHARGE NOTE NURSING/CASE MANAGEMENT/SOCIAL WORK - PATIENT PORTAL LINK FT
You can access the FollowMyHealth Patient Portal offered by [facility] by registering at the following website: [email or web address]. By joining [employer]’s FollowMyHealth portal, you will also be able to view your health information using other applications (apps) compatible with our system.

## 2023-04-27 PROBLEM — N70.93 SALPINGITIS AND OOPHORITIS, UNSPECIFIED: Chronic | Status: ACTIVE | Noted: 2023-04-12

## 2023-05-01 RX ORDER — TRAMADOL HYDROCHLORIDE 50 MG/1
1 TABLET ORAL
Qty: 12 | Refills: 0
Start: 2023-05-01 | End: 2023-05-03

## 2023-05-01 RX ORDER — TRAMADOL HYDROCHLORIDE 50 MG/1
1 TABLET ORAL
Refills: 0
Start: 2023-05-01

## 2023-05-03 ENCOUNTER — APPOINTMENT (OUTPATIENT)
Dept: SURGERY | Facility: CLINIC | Age: 43
End: 2023-05-03
Payer: COMMERCIAL

## 2023-05-03 ENCOUNTER — NON-APPOINTMENT (OUTPATIENT)
Age: 43
End: 2023-05-03

## 2023-05-03 VITALS — HEIGHT: 63 IN | TEMPERATURE: 96.2 F | WEIGHT: 100 LBS | BODY MASS INDEX: 17.72 KG/M2

## 2023-05-03 DIAGNOSIS — K63.89 OTHER SPECIFIED DISEASES OF INTESTINE: ICD-10-CM

## 2023-05-03 PROBLEM — K63.5 POLYP OF COLON: Chronic | Status: ACTIVE | Noted: 2023-04-12

## 2023-05-03 PROCEDURE — 99024 POSTOP FOLLOW-UP VISIT: CPT

## 2023-05-04 ENCOUNTER — OUTPATIENT (OUTPATIENT)
Dept: OUTPATIENT SERVICES | Facility: HOSPITAL | Age: 43
LOS: 1 days | Discharge: HOME | End: 2023-05-04

## 2023-05-04 DIAGNOSIS — Z98.890 OTHER SPECIFIED POSTPROCEDURAL STATES: Chronic | ICD-10-CM

## 2023-05-04 DIAGNOSIS — C18.9 MALIGNANT NEOPLASM OF COLON, UNSPECIFIED: ICD-10-CM

## 2023-05-04 DIAGNOSIS — N70.93 SALPINGITIS AND OOPHORITIS, UNSPECIFIED: Chronic | ICD-10-CM

## 2023-05-04 PROBLEM — K63.89 COLONIC MASS: Status: ACTIVE | Noted: 2023-03-29

## 2023-05-04 NOTE — PHYSICAL EXAM
[Abdominal Masses] : No abdominal masses [Abdomen Tenderness] : ~T ~M No abdominal tenderness [de-identified] : All wounds healing well

## 2023-05-08 ENCOUNTER — NON-APPOINTMENT (OUTPATIENT)
Age: 43
End: 2023-05-08

## 2023-05-10 ENCOUNTER — NON-APPOINTMENT (OUTPATIENT)
Age: 43
End: 2023-05-10

## 2023-05-11 ENCOUNTER — APPOINTMENT (OUTPATIENT)
Dept: HEMATOLOGY ONCOLOGY | Facility: CLINIC | Age: 43
End: 2023-05-11
Payer: COMMERCIAL

## 2023-05-11 ENCOUNTER — RESULT REVIEW (OUTPATIENT)
Age: 43
End: 2023-05-11

## 2023-05-11 VITALS
OXYGEN SATURATION: 99 % | TEMPERATURE: 97 F | WEIGHT: 98.11 LBS | SYSTOLIC BLOOD PRESSURE: 111 MMHG | RESPIRATION RATE: 19 BRPM | BODY MASS INDEX: 17.17 KG/M2 | HEIGHT: 63.46 IN | DIASTOLIC BLOOD PRESSURE: 75 MMHG | HEART RATE: 88 BPM

## 2023-05-11 DIAGNOSIS — Z98.890 OTHER SPECIFIED POSTPROCEDURAL STATES: ICD-10-CM

## 2023-05-11 DIAGNOSIS — Z78.9 OTHER SPECIFIED HEALTH STATUS: ICD-10-CM

## 2023-05-11 LAB
BASOPHILS # BLD AUTO: 0.06 K/UL — SIGNIFICANT CHANGE UP (ref 0–0.2)
BASOPHILS NFR BLD AUTO: 0.7 % — SIGNIFICANT CHANGE UP (ref 0–2)
EOSINOPHIL # BLD AUTO: 0.13 K/UL — SIGNIFICANT CHANGE UP (ref 0–0.5)
EOSINOPHIL NFR BLD AUTO: 1.4 % — SIGNIFICANT CHANGE UP (ref 0–6)
HCT VFR BLD CALC: 32.9 % — LOW (ref 34.5–45)
HGB BLD-MCNC: 9.9 G/DL — LOW (ref 11.5–15.5)
IMM GRANULOCYTES NFR BLD AUTO: 0.3 % — SIGNIFICANT CHANGE UP (ref 0–0.9)
INR PPP: 1.18 RATIO
LYMPHOCYTES # BLD AUTO: 1.86 K/UL — SIGNIFICANT CHANGE UP (ref 1–3.3)
LYMPHOCYTES # BLD AUTO: 20.2 % — SIGNIFICANT CHANGE UP (ref 13–44)
MCHC RBC-ENTMCNC: 22.2 PG — LOW (ref 27–34)
MCHC RBC-ENTMCNC: 30.1 G/DL — LOW (ref 32–36)
MCV RBC AUTO: 73.8 FL — LOW (ref 80–100)
MONOCYTES # BLD AUTO: 0.55 K/UL — SIGNIFICANT CHANGE UP (ref 0–0.9)
MONOCYTES NFR BLD AUTO: 6 % — SIGNIFICANT CHANGE UP (ref 2–14)
NEUTROPHILS # BLD AUTO: 6.6 K/UL — SIGNIFICANT CHANGE UP (ref 1.8–7.4)
NEUTROPHILS NFR BLD AUTO: 71.4 % — SIGNIFICANT CHANGE UP (ref 43–77)
NRBC # BLD: 0 /100 WBCS — SIGNIFICANT CHANGE UP (ref 0–0)
PLATELET # BLD AUTO: 564 K/UL — HIGH (ref 150–400)
PT BLD: 13.8 SEC
RBC # BLD: 4.46 M/UL — SIGNIFICANT CHANGE UP (ref 3.8–5.2)
RBC # FLD: 18.3 % — HIGH (ref 10.3–14.5)
RETICS #: 24.5 K/UL — LOW (ref 25–125)
RETICS/RBC NFR: 0.6 % — SIGNIFICANT CHANGE UP (ref 0.5–2.5)
WBC # BLD: 9.23 K/UL — SIGNIFICANT CHANGE UP (ref 3.8–10.5)
WBC # FLD AUTO: 9.23 K/UL — SIGNIFICANT CHANGE UP (ref 3.8–10.5)

## 2023-05-11 PROCEDURE — 99245 OFF/OP CONSLTJ NEW/EST HI 55: CPT

## 2023-05-11 RX ORDER — DIPHENHYDRAMINE HYDROCHLORIDE AND LIDOCAINE HYDROCHLORIDE AND ALUMINUM HYDROXIDE AND MAGNESIUM HYDRO
KIT
Qty: 1 | Refills: 3 | Status: DISCONTINUED | COMMUNITY
Start: 2023-04-10 | End: 2023-05-11

## 2023-05-11 RX ORDER — LIDOCAINE 0.01 MG/G
5 OINTMENT TOPICAL EVERY 8 HOURS
Qty: 1 | Refills: 1 | Status: DISCONTINUED | COMMUNITY
Start: 2023-04-06 | End: 2023-05-11

## 2023-05-11 RX ORDER — METRONIDAZOLE 500 MG/1
500 TABLET ORAL
Qty: 3 | Refills: 0 | Status: DISCONTINUED | COMMUNITY
Start: 2023-04-10 | End: 2023-05-11

## 2023-05-11 RX ORDER — TERCONAZOLE 8 MG/G
0.8 CREAM VAGINAL
Qty: 20 | Refills: 0 | Status: DISCONTINUED | COMMUNITY
Start: 2023-03-25 | End: 2023-05-11

## 2023-05-11 RX ORDER — NEOMYCIN SULFATE 500 MG/1
500 TABLET ORAL
Qty: 6 | Refills: 0 | Status: DISCONTINUED | COMMUNITY
Start: 2023-04-10 | End: 2023-05-11

## 2023-05-11 RX ORDER — FLUCONAZOLE 150 MG/1
150 TABLET ORAL
Qty: 1 | Refills: 0 | Status: DISCONTINUED | COMMUNITY
Start: 2023-03-19 | End: 2023-05-11

## 2023-05-11 RX ORDER — PEG-3350, SODIUM SULFATE, SODIUM CHLORIDE, POTASSIUM CHLORIDE, SODIUM ASCORBATE AND ASCORBIC ACID 7.5-2.691G
100 KIT ORAL
Qty: 1 | Refills: 0 | Status: DISCONTINUED | COMMUNITY
Start: 2023-04-17 | End: 2023-05-11

## 2023-05-11 RX ORDER — SULFAMETHOXAZOLE AND TRIMETHOPRIM 800; 160 MG/1; MG/1
800-160 TABLET ORAL
Refills: 0 | Status: DISCONTINUED | COMMUNITY
End: 2023-05-11

## 2023-05-11 RX ORDER — POLYETHYLENE GLYCOL 3350 17 G/17G
17 POWDER, FOR SOLUTION ORAL DAILY
Qty: 1 | Refills: 1 | Status: DISCONTINUED | COMMUNITY
Start: 2023-04-06 | End: 2023-05-11

## 2023-05-11 NOTE — HISTORY OF PRESENT ILLNESS
[Disease: _____________________] : Disease: [unfilled] [T: ___] : T[unfilled] [N: ___] : N[unfilled] [de-identified] : Patient presenting at the request of her surgeon for oncology consultation for colon cancer.  \par \par 3/2/2023-Patient presented to the [place] ED with LLQ pain.\par --CT chest-Evidence of a few lung nodules and smooth septal lines involving both lungs.\par --CT abdomen/pelvis–left adnexal mass measuring 9 cm with infiltration of the fat adjacent to the mass.  Further evaluation recommended.  Infiltration of the fat adjacent to the mass and multiple lymph nodes measuring up to 8 mm short axis.  Left lower lobe pulmonary nodule measuring 6 mm and partially imaged right lower lobe pulmonary nodule measuring 5 mm.\par Pelvic ultrasound–left ovary is enlarged consistent with the appearance on the CT with a central heterogeneous focus.  This may represent a large hemorrhagic cyst.  Recommend further evaluation with MR imaging.  Intermittent torsion cannot be excluded.  Unremarkable right ovary.  Uterus is remarkable for small fibroid.\par \par 3/3/2023–MRI of the abdomen/pelvis–9 cm left tubo-ovarian abscess.  No acute abdominal findings.  Trace ascites.  \par Patient was transferred to [facility] and treated for sepsis.  Status post drainage of a left TOA.  Discharged from the hospital 3/9/2023.  IR drain removal 3/16/2023.\par \par 3/16/2023–CT chest/abdomen/pelvis–multiple bilateral lung nodules measuring up to 6 mm are indeterminant.  Previously noted 9 cm left ovarian collection decreased, now measuring 2.5 cm.  Mesenteric nodules within the right hemiabdomen measuring up to 1.2 cm, new from 3/2/2023.  Short segment wall masslike wall thickening of the mid sigmoid colon concerning for neoplasm.  New right-sided peritoneal nodularity.  In light of the sigmoid colon mass the lung nodules are highly suspicious for metastatic disease.\par \par 3/30/2023-CT abdomen/pelvis–multiple cystic structures in the left adnexa likely representing improving tubo-ovarian abscesses.  4 cm right adnexal cyst most compatible with a hemorrhagic cyst.\par \par 4/6/2023–Colonoscopy-localized friable polypoid mass with contact bleeding in the distal sigmoid colon at 30 cm.  Unable to traverse the lesion with the colonoscope.  Pathology from the sigmoid colon mass biopsy–fragments of tubulovillous adenoma with focal high-grade dysplasia.\par \par 4/20/2023–Status post partial omentectomy pathology revealing mature adipose tissue with nonspecific congestion and chronic inflammation.  Status post left salpingo-oophorectomy with nonspecific tubo-ovarian abscess.  Status post left partial colectomy with pathology revealing invasive adenocarcinoma, moderately differentiated, 4 cm, invading through the muscularis propria into the pericolonic soft tissue.  No LVI or perineural invasion.  Margins negative.  1/55 lymph nodes positive for metastatic carcinoma.\par \par Currently, no complaints of chest pain, shortness of breath, nausea/vomiting, pelvic pain. Occasional mild post-op lower abdominal discomfort. No fevers.  No bleeding.\par Has had COVID vaccines.\par  David present. [de-identified] : Adenocarcinoma [de-identified] : CEA=<0.6 (4/11/2023)

## 2023-05-11 NOTE — ADDENDUM
[FreeTextEntry1] : Note:\par *Case discussed at multidisciplinary GI tumor board 5/10/2023.\par Upon review of radiology studies, per radiology, small pulmonary nodules indeterminate in nature currently-attention on interval f/u. \par Consensus was to repeat CT scan of the chest/abdomen/pelvis, as patient had infectious/inflammatory ovarian process at time of prior imaging–if no obvious metastatic disease, would proceed with adjuvant systemic chemotherapy. If still concern for metastatic disease, may need to consider biopsy of a suspicious lesion. Note-Case discussed with surgeon Dr. Smith 5/10/2023 as well–he did not see evidence of gross metastatic disease at the time of recent surgery.\par

## 2023-05-11 NOTE — CONSULT LETTER
[Dear  ___] : Dear  [unfilled], [Consult Letter:] : I had the pleasure of evaluating your patient, [unfilled]. [Please see my note below.] : Please see my note below. [Consult Closing:] : Thank you very much for allowing me to participate in the care of this patient.  If you have any questions, please do not hesitate to contact me. [Sincerely,] : Sincerely, [DrSkip  ___] : Dr. GARCIA [FreeTextEntry3] : Radha Larson MD

## 2023-05-11 NOTE — PHYSICAL EXAM
[Fully active, able to carry on all pre-disease performance without restriction] : Status 0 - Fully active, able to carry on all pre-disease performance without restriction [Normal] : affect appropriate [Thin] : thin [de-identified] : soft, NT without palpable hepatosplenomegaly [de-identified] : abdominal surgical scar healed

## 2023-05-11 NOTE — ASSESSMENT
[FreeTextEntry1] : CT scan chest/abdomen/pelvis reports, MRI abdomen/pelvis report, pelvis ultrasound report, colonoscopy report, colon pathology reports, 5/3/2023 surgery note, 4/11/2023 GI result note/10/23 GYN note reviewed.\par \par pT3N1a left colon adenocarcinoma status post left hemicolectomy.  MMR intact.\par Eventually will require full colonoscopy as was incomplete at the time of diagnosis secondary to tumor at 30 cm (scope could not transverse).\par Discussed small pulmonary nodules on CT chest imaging–differential diagnosis reviewed–cannot rule out metastatic disease.\par -f/u CT C/A/P ordered\par -order Signatera ctDNA (plan next visit pending decision regarding clinical trial)\par -Lab work ordered, CEA\par -Obtain genetic testing-patient consents\par \par If further evaluation not diagnostic of distant metastatic disease at this point, would represent a Stage IIIB colon cancer.  Reviewed this diagnosis/prognosis and treatment options.\par Would recommend adjuvant systemic therapy–alternatives reviewed with respective pros/cons.  To consider CapeOx x3 months–potential side effects explained including but not limited to nausea/vomiting, alopecia, allergic reaction, diarrhea, mucositis, neurotoxicity, myelosuppression.  \par Will need port placement for vascular access\par Did discuss clinical trial Circulate-US (and patient met with research nurse).  Pending CT scans, patient may be a candidate for this.  She is unsure she wishes to pursue a clinical trial at this time, however, is willing to review the material and consider.\par \par Oncologic treatment recommendations to be finalized pending the above.\par \par Patient was given the opportunity to ask questions.  Her questions have been answered to her apparent satisfaction at this time.  She expressed her understanding and willingness to comply with recommended follow-up.\par \par \par

## 2023-05-12 ENCOUNTER — RESULT REVIEW (OUTPATIENT)
Age: 43
End: 2023-05-12

## 2023-05-12 ENCOUNTER — NON-APPOINTMENT (OUTPATIENT)
Age: 43
End: 2023-05-12

## 2023-05-12 LAB
ALBUMIN SERPL ELPH-MCNC: 5 G/DL
ALP BLD-CCNC: 94 U/L
ALT SERPL-CCNC: 15 U/L
ANION GAP SERPL CALC-SCNC: 14 MMOL/L
AST SERPL-CCNC: 12 U/L
BILIRUB SERPL-MCNC: 0.3 MG/DL
BUN SERPL-MCNC: 10 MG/DL
CALCIUM SERPL-MCNC: 10.8 MG/DL
CEA SERPL-MCNC: <0.6 NG/ML
CHLORIDE SERPL-SCNC: 100 MMOL/L
CO2 SERPL-SCNC: 24 MMOL/L
CREAT SERPL-MCNC: 0.48 MG/DL
EGFR: 122 ML/MIN/1.73M2
FERRITIN SERPL-MCNC: 15 NG/ML
GLUCOSE SERPL-MCNC: 86 MG/DL
HAV IGM SER QL: NONREACTIVE
HBV CORE IGG+IGM SER QL: NONREACTIVE
HBV CORE IGM SER QL: NONREACTIVE
HBV SURFACE AG SER QL: NONREACTIVE
HCV AB SER QL: NONREACTIVE
HCV S/CO RATIO: 0.17 S/CO
IRON SATN MFR SERPL: 6 %
IRON SERPL-MCNC: 20 UG/DL
POTASSIUM SERPL-SCNC: 4.6 MMOL/L
PROT SERPL-MCNC: 8 G/DL
SODIUM SERPL-SCNC: 138 MMOL/L
TIBC SERPL-MCNC: 340 UG/DL
UIBC SERPL-MCNC: 320 UG/DL

## 2023-05-16 ENCOUNTER — APPOINTMENT (OUTPATIENT)
Dept: CT IMAGING | Facility: HOSPITAL | Age: 43
End: 2023-05-16
Payer: COMMERCIAL

## 2023-05-16 ENCOUNTER — OUTPATIENT (OUTPATIENT)
Dept: OUTPATIENT SERVICES | Facility: HOSPITAL | Age: 43
LOS: 1 days | End: 2023-05-16
Payer: COMMERCIAL

## 2023-05-16 DIAGNOSIS — C18.9 MALIGNANT NEOPLASM OF COLON, UNSPECIFIED: ICD-10-CM

## 2023-05-16 DIAGNOSIS — R91.8 OTHER NONSPECIFIC ABNORMAL FINDING OF LUNG FIELD: ICD-10-CM

## 2023-05-16 DIAGNOSIS — N70.93 SALPINGITIS AND OOPHORITIS, UNSPECIFIED: Chronic | ICD-10-CM

## 2023-05-16 DIAGNOSIS — Z98.890 OTHER SPECIFIED POSTPROCEDURAL STATES: Chronic | ICD-10-CM

## 2023-05-16 PROCEDURE — 71260 CT THORAX DX C+: CPT

## 2023-05-16 PROCEDURE — 71260 CT THORAX DX C+: CPT | Mod: 26

## 2023-05-16 PROCEDURE — 74177 CT ABD & PELVIS W/CONTRAST: CPT

## 2023-05-16 PROCEDURE — 74177 CT ABD & PELVIS W/CONTRAST: CPT | Mod: 26

## 2023-05-22 ENCOUNTER — NON-APPOINTMENT (OUTPATIENT)
Age: 43
End: 2023-05-22

## 2023-05-23 ENCOUNTER — NON-APPOINTMENT (OUTPATIENT)
Age: 43
End: 2023-05-23

## 2023-05-24 ENCOUNTER — APPOINTMENT (OUTPATIENT)
Dept: HEMATOLOGY ONCOLOGY | Facility: CLINIC | Age: 43
End: 2023-05-24
Payer: COMMERCIAL

## 2023-05-24 ENCOUNTER — RESULT REVIEW (OUTPATIENT)
Age: 43
End: 2023-05-24

## 2023-05-24 ENCOUNTER — NON-APPOINTMENT (OUTPATIENT)
Age: 43
End: 2023-05-24

## 2023-05-24 VITALS
WEIGHT: 99.21 LBS | BODY MASS INDEX: 17.32 KG/M2 | SYSTOLIC BLOOD PRESSURE: 103 MMHG | DIASTOLIC BLOOD PRESSURE: 66 MMHG | TEMPERATURE: 96.4 F | RESPIRATION RATE: 18 BRPM | OXYGEN SATURATION: 99 % | HEART RATE: 85 BPM

## 2023-05-24 LAB
BASOPHILS # BLD AUTO: 0.04 K/UL — SIGNIFICANT CHANGE UP (ref 0–0.2)
BASOPHILS NFR BLD AUTO: 0.3 % — SIGNIFICANT CHANGE UP (ref 0–2)
EOSINOPHIL # BLD AUTO: 0.18 K/UL — SIGNIFICANT CHANGE UP (ref 0–0.5)
EOSINOPHIL NFR BLD AUTO: 1.4 % — SIGNIFICANT CHANGE UP (ref 0–6)
HCT VFR BLD CALC: 32.7 % — LOW (ref 34.5–45)
HGB BLD-MCNC: 9.9 G/DL — LOW (ref 11.5–15.5)
IMM GRANULOCYTES NFR BLD AUTO: 0.4 % — SIGNIFICANT CHANGE UP (ref 0–0.9)
LYMPHOCYTES # BLD AUTO: 1.76 K/UL — SIGNIFICANT CHANGE UP (ref 1–3.3)
LYMPHOCYTES # BLD AUTO: 13.7 % — SIGNIFICANT CHANGE UP (ref 13–44)
MCHC RBC-ENTMCNC: 22.5 PG — LOW (ref 27–34)
MCHC RBC-ENTMCNC: 30.3 G/DL — LOW (ref 32–36)
MCV RBC AUTO: 74.3 FL — LOW (ref 80–100)
MONOCYTES # BLD AUTO: 0.8 K/UL — SIGNIFICANT CHANGE UP (ref 0–0.9)
MONOCYTES NFR BLD AUTO: 6.2 % — SIGNIFICANT CHANGE UP (ref 2–14)
NEUTROPHILS # BLD AUTO: 10.01 K/UL — HIGH (ref 1.8–7.4)
NEUTROPHILS NFR BLD AUTO: 78 % — HIGH (ref 43–77)
NRBC # BLD: 0 /100 WBCS — SIGNIFICANT CHANGE UP (ref 0–0)
PLATELET # BLD AUTO: 380 K/UL — SIGNIFICANT CHANGE UP (ref 150–400)
RBC # BLD: 4.4 M/UL — SIGNIFICANT CHANGE UP (ref 3.8–5.2)
RBC # FLD: 19 % — HIGH (ref 10.3–14.5)
WBC # BLD: 12.84 K/UL — HIGH (ref 3.8–10.5)
WBC # FLD AUTO: 12.84 K/UL — HIGH (ref 3.8–10.5)

## 2023-05-24 PROCEDURE — 99215 OFFICE O/P EST HI 40 MIN: CPT

## 2023-05-24 NOTE — ASSESSMENT
[FreeTextEntry1] : CT scan chest/abdomen/pelvis reports, lab work reviewed.\par \par #1) pT3N1a left colon adenocarcinoma status post left hemicolectomy.  MMR intact.\par Eventually will require full colonoscopy as was incomplete at the time of diagnosis secondary to tumor at 30 cm (scope could not transverse).\par 5/23/2023- CT C/A/P-Bilateral pulmonary nodules which have increased in size since 3/17/2023, compatible with enlarging pulmonary metastases. New hepatic lesion compatible with hepatic metastasis.New wedge-shaped peripheral left lower lobe pole renal lesion, likely representing a renal infarct.\par Reviewed CT scan results with patient and her  in detail (and they were given a copy of report).\par Goals of care reviewed.\par \par -discussed obtaining tissue diagnosis for pathologic confirmation of metastasis-to arrange for US guided liver biopsy.\par -order Signatera ctDNA\par -order [facility] on pathology\par -schedule mediport placement\par -Inv[facility]ae genetic testing results pending\par \par Discussed treatment options pending Foundation One-would start with FOLFOX/with plans to add bevacizumab following liver biopsy and port placement-potential side effects explained including but not limited to N/V/D, allergic reaction, rash, stomatitis, cardiac/ neuro-/BM toxicities, bleeding/clotting, proteinuria, HTN. Patient gave verbal and written consent for treatment.\par Offered availability for patient to obtain a second opinion should they wish to pursue this.  Patient wishes to proceed with treatment as outlined, at this time.\par \par #2) anemia/iron deficiency–potential benefits/side effects of p.o./IV iron explained.  Patient wishes to proceed with parenteral Venofer-potential side effects reviewed including but not limited to allergic reaction, skin hyperpigmentation.  Patient gave verbal and written consent for treatment.\par \par #3) possible renal infarct on CT 5/2023-no related symptoms currently.  In setting of advanced malignancy, is a concern.  Following invasive procedures (liver biopsy, port placement), if status/anemia stable, to consider anticoagulation.\par \par Patient was given the opportunity to ask questions.  Her questions have been answered to her apparent satisfaction at this time.  She expressed her understanding and willingness to comply with recommended follow-up.\par \par \par

## 2023-05-24 NOTE — HISTORY OF PRESENT ILLNESS
[3 - Distress Level] : Distress Level: 3 [de-identified] : Patient presenting at the request of her surgeon for oncology consultation for colon cancer.  \par \par 3/2/2023-Patient presented to the [place] ED with LLQ pain.\par --CT chest-Evidence of a few lung nodules and smooth septal lines involving both lungs.\par --CT abdomen/pelvis–left adnexal mass measuring 9 cm with infiltration of the fat adjacent to the mass.  Further evaluation recommended.  Infiltration of the fat adjacent to the mass and multiple lymph nodes measuring up to 8 mm short axis.  Left lower lobe pulmonary nodule measuring 6 mm and partially imaged right lower lobe pulmonary nodule measuring 5 mm.\par Pelvic ultrasound–left ovary is enlarged consistent with the appearance on the CT with a central heterogeneous focus.  This may represent a large hemorrhagic cyst.  Recommend further evaluation with MR imaging.  Intermittent torsion cannot be excluded.  Unremarkable right ovary.  Uterus is remarkable for small fibroid.\par \par 3/3/2023–MRI of the abdomen/pelvis–9 cm left tubo-ovarian abscess.  No acute abdominal findings.  Trace ascites.  \par Patient was transferred to [facility] and treated for sepsis.  Status post drainage of a left TOA.  Discharged from the hospital 3/9/2023.  IR drain removal 3/16/2023.\par \par 3/16/2023–CT chest/abdomen/pelvis–multiple bilateral lung nodules measuring up to 6 mm are indeterminant.  Previously noted 9 cm left ovarian collection decreased, now measuring 2.5 cm.  Mesenteric nodules within the right hemiabdomen measuring up to 1.2 cm, new from 3/2/2023.  Short segment wall masslike wall thickening of the mid sigmoid colon concerning for neoplasm.  New right-sided peritoneal nodularity.  In light of the sigmoid colon mass the lung nodules are highly suspicious for metastatic disease.\par \par 3/30/2023-CT abdomen/pelvis–multiple cystic structures in the left adnexa likely representing improving tubo-ovarian abscesses.  4 cm right adnexal cyst most compatible with a hemorrhagic cyst.\par \par 4/6/2023–Colonoscopy-localized friable polypoid mass with contact bleeding in the distal sigmoid colon at 30 cm.  Unable to traverse the lesion with the colonoscope.  Pathology from the sigmoid colon mass biopsy–fragments of tubulovillous adenoma with focal high-grade dysplasia.\par \par 4/20/2023–Status post partial omentectomy pathology revealing mature adipose tissue with nonspecific congestion and chronic inflammation.  Status post left salpingo-oophorectomy with nonspecific tubo-ovarian abscess.  Status post left partial colectomy with pathology revealing invasive adenocarcinoma, moderately differentiated, 4 cm, invading through the muscularis propria into the pericolonic soft tissue.  No LVI or perineural invasion.  Margins negative.  1/55 lymph nodes positive for metastatic carcinoma.\par \par 5/16/2023-CT C/A/P-Bilateral pulmonary nodules which have increased in size since 3/17/2023, compatible with enlarging pulmonary metastases.\par  New hepatic lesion compatible with hepatic metastasis. New wedge-shaped peripheral left lower lobe pole renal lesion, likely representing a renal infarct.\par \par  [de-identified] : Adenocarcinoma [de-identified] : CEA=<0.6 (4/11/2023) [de-identified] : Currently, no complaints of chest pain, shortness of breath, nausea/vomiting. Occasional lower abdominal discomfort. No fevers.  No bleeding.\par \par  David present.

## 2023-05-24 NOTE — ADDENDUM
[FreeTextEntry1] : 's questions answered regarding patient's diagnosis/prognosis-he expresses understanding of the serious nature of her illness.\par

## 2023-05-24 NOTE — PHYSICAL EXAM
[de-identified] : soft, NT without palpable hepatosplenomegaly [de-identified] : abdominal surgical scar healed

## 2023-05-25 LAB
ALBUMIN SERPL ELPH-MCNC: 4.9 G/DL
ALP BLD-CCNC: 100 U/L
ALT SERPL-CCNC: 19 U/L
ANION GAP SERPL CALC-SCNC: 14 MMOL/L
AST SERPL-CCNC: 12 U/L
BILIRUB SERPL-MCNC: 0.2 MG/DL
BUN SERPL-MCNC: 14 MG/DL
CALCIUM SERPL-MCNC: 9.9 MG/DL
CEA SERPL-MCNC: <0.6 NG/ML
CHLORIDE SERPL-SCNC: 99 MMOL/L
CO2 SERPL-SCNC: 23 MMOL/L
CREAT SERPL-MCNC: 0.46 MG/DL
EGFR: 123 ML/MIN/1.73M2
GLUCOSE SERPL-MCNC: 94 MG/DL
HCG SERPL QL: NEGATIVE
MAGNESIUM SERPL-MCNC: 2.1 MG/DL
PAPP-A SERPL-ACNC: <1 MIU/ML
POTASSIUM SERPL-SCNC: 4.3 MMOL/L
PROT SERPL-MCNC: 7.6 G/DL
SODIUM SERPL-SCNC: 136 MMOL/L

## 2023-05-30 ENCOUNTER — RESULT REVIEW (OUTPATIENT)
Age: 43
End: 2023-05-30

## 2023-05-30 ENCOUNTER — APPOINTMENT (OUTPATIENT)
Dept: ULTRASOUND IMAGING | Facility: IMAGING CENTER | Age: 43
End: 2023-05-30
Payer: COMMERCIAL

## 2023-05-30 ENCOUNTER — OUTPATIENT (OUTPATIENT)
Dept: OUTPATIENT SERVICES | Facility: HOSPITAL | Age: 43
LOS: 1 days | End: 2023-05-30
Payer: COMMERCIAL

## 2023-05-30 DIAGNOSIS — C18.9 MALIGNANT NEOPLASM OF COLON, UNSPECIFIED: ICD-10-CM

## 2023-05-30 DIAGNOSIS — Z98.890 OTHER SPECIFIED POSTPROCEDURAL STATES: Chronic | ICD-10-CM

## 2023-05-30 PROCEDURE — 88342 IMHCHEM/IMCYTCHM 1ST ANTB: CPT | Mod: 26

## 2023-05-30 PROCEDURE — 88342 IMHCHEM/IMCYTCHM 1ST ANTB: CPT

## 2023-05-30 PROCEDURE — 88341 IMHCHEM/IMCYTCHM EA ADD ANTB: CPT

## 2023-05-30 PROCEDURE — 88307 TISSUE EXAM BY PATHOLOGIST: CPT | Mod: 26

## 2023-05-30 PROCEDURE — 88307 TISSUE EXAM BY PATHOLOGIST: CPT

## 2023-05-30 PROCEDURE — 76942 ECHO GUIDE FOR BIOPSY: CPT | Mod: 26

## 2023-05-30 PROCEDURE — 76942 ECHO GUIDE FOR BIOPSY: CPT

## 2023-05-30 PROCEDURE — 47000 NEEDLE BIOPSY OF LIVER PERQ: CPT

## 2023-05-30 PROCEDURE — 88341 IMHCHEM/IMCYTCHM EA ADD ANTB: CPT | Mod: 26

## 2023-05-31 ENCOUNTER — RESULT REVIEW (OUTPATIENT)
Age: 43
End: 2023-05-31

## 2023-05-31 ENCOUNTER — OUTPATIENT (OUTPATIENT)
Dept: OUTPATIENT SERVICES | Facility: HOSPITAL | Age: 43
LOS: 1 days | End: 2023-05-31
Payer: COMMERCIAL

## 2023-05-31 ENCOUNTER — TRANSCRIPTION ENCOUNTER (OUTPATIENT)
Age: 43
End: 2023-05-31

## 2023-05-31 ENCOUNTER — NON-APPOINTMENT (OUTPATIENT)
Age: 43
End: 2023-05-31

## 2023-05-31 VITALS
WEIGHT: 98.99 LBS | OXYGEN SATURATION: 100 % | HEIGHT: 63 IN | DIASTOLIC BLOOD PRESSURE: 76 MMHG | HEART RATE: 75 BPM | TEMPERATURE: 99 F | SYSTOLIC BLOOD PRESSURE: 106 MMHG | RESPIRATION RATE: 16 BRPM

## 2023-05-31 VITALS
SYSTOLIC BLOOD PRESSURE: 94 MMHG | DIASTOLIC BLOOD PRESSURE: 64 MMHG | HEART RATE: 65 BPM | RESPIRATION RATE: 17 BRPM | OXYGEN SATURATION: 100 %

## 2023-05-31 DIAGNOSIS — Z98.890 OTHER SPECIFIED POSTPROCEDURAL STATES: Chronic | ICD-10-CM

## 2023-05-31 DIAGNOSIS — N70.93 SALPINGITIS AND OOPHORITIS, UNSPECIFIED: Chronic | ICD-10-CM

## 2023-05-31 DIAGNOSIS — C18.9 MALIGNANT NEOPLASM OF COLON, UNSPECIFIED: ICD-10-CM

## 2023-05-31 LAB
MISCELLANEOUS TEST: NORMAL
PROC NAME: NORMAL

## 2023-05-31 PROCEDURE — 76937 US GUIDE VASCULAR ACCESS: CPT | Mod: 26

## 2023-05-31 PROCEDURE — 77001 FLUOROGUIDE FOR VEIN DEVICE: CPT

## 2023-05-31 PROCEDURE — 36561 INSERT TUNNELED CV CATH: CPT

## 2023-05-31 PROCEDURE — 76937 US GUIDE VASCULAR ACCESS: CPT

## 2023-05-31 PROCEDURE — 77001 FLUOROGUIDE FOR VEIN DEVICE: CPT | Mod: 26

## 2023-05-31 PROCEDURE — C1894: CPT

## 2023-05-31 PROCEDURE — C1788: CPT

## 2023-05-31 PROCEDURE — C1769: CPT

## 2023-05-31 RX ORDER — SODIUM CHLORIDE 9 MG/ML
1000 INJECTION, SOLUTION INTRAVENOUS
Refills: 0 | Status: DISCONTINUED | OUTPATIENT
Start: 2023-05-31 | End: 2023-06-14

## 2023-05-31 NOTE — PROCEDURE NOTE - PROCEDURE FINDINGS AND DETAILS
6.6 [place] powerport placed using US and fluoro guidance. Tip in SVC.  MAy use immediately - full report to follow.

## 2023-05-31 NOTE — ASU DISCHARGE PLAN (ADULT/PEDIATRIC) - NS MD DC FALL RISK RISK
For information on Fall & Injury Prevention, visit: [email or web address] OR  [email or web address] OR  https://www.cdc.gov/steadi/patient.html

## 2023-05-31 NOTE — ASU PATIENT PROFILE, ADULT - AS SC BRADEN MOBILITY
(4) no limitation
Implemented All Universal Safety Interventions:  [place] to call system. Call bell, personal items and telephone within reach. Instruct patient to call for assistance. Room bathroom lighting operational. Non-slip footwear when patient is off stretcher. Physically safe environment: no spills, clutter or unnecessary equipment. Stretcher in lowest position, wheels locked, appropriate side rails in place.

## 2023-05-31 NOTE — PROGRESS NOTE ADULT - SUBJECTIVE AND OBJECTIVE BOX
Interventional Radiology    HPI: 42y Female with colorectal ca for chest port insertion    Allergies: oxycodone (Pruritus)    Medications (Abx/Cardiac/Anticoagulation/Blood Products)      Data:  160  44.9  T(C): 37.1  HR: 75  BP: 106/76  RR: 16  SpO2: 100%    Exam  General: No acute distress  Chest: Non labored breathing  Abdomen: Non-distended  Extremities: No swelling, warm          Imaging:     Plan: 42y Female presents for chest port insertion.  -Risks/Benefits/alternatives explained with the patient and/or healthcare proxy and witnessed informed consent obtained.

## 2023-05-31 NOTE — ASU DISCHARGE PLAN (ADULT/PEDIATRIC) - NURSING INSTRUCTIONS
Please feel free to contact us at [phone] if any problems arise. After 6PM, Monday through Friday, on weekends and on holidays, please call [phone] and ask for the radiology resident on call to be paged.

## 2023-06-01 LAB — SURGICAL PATHOLOGY STUDY: SIGNIFICANT CHANGE UP

## 2023-06-02 ENCOUNTER — NON-APPOINTMENT (OUTPATIENT)
Age: 43
End: 2023-06-02

## 2023-06-02 ENCOUNTER — APPOINTMENT (OUTPATIENT)
Dept: SURGERY | Facility: CLINIC | Age: 43
End: 2023-06-02
Payer: COMMERCIAL

## 2023-06-02 VITALS — BODY MASS INDEX: 17.54 KG/M2 | HEIGHT: 63 IN | WEIGHT: 99 LBS | TEMPERATURE: 97.7 F

## 2023-06-02 PROCEDURE — 99024 POSTOP FOLLOW-UP VISIT: CPT

## 2023-06-02 NOTE — PHYSICAL EXAM
[Abdominal Masses] : No abdominal masses [Abdomen Tenderness] : ~T ~M No abdominal tenderness [de-identified] : wound healing well

## 2023-06-05 ENCOUNTER — NON-APPOINTMENT (OUTPATIENT)
Age: 43
End: 2023-06-05

## 2023-06-05 RX ORDER — PROCHLORPERAZINE MALEATE 10 MG/1
10 TABLET ORAL
Qty: 30 | Refills: 2 | Status: ACTIVE | COMMUNITY
Start: 2023-06-05 | End: 1900-01-01

## 2023-06-07 ENCOUNTER — APPOINTMENT (OUTPATIENT)
Dept: INFUSION THERAPY | Facility: HOSPITAL | Age: 43
End: 2023-06-07

## 2023-06-07 ENCOUNTER — RESULT REVIEW (OUTPATIENT)
Age: 43
End: 2023-06-07

## 2023-06-07 ENCOUNTER — APPOINTMENT (OUTPATIENT)
Dept: HEMATOLOGY ONCOLOGY | Facility: CLINIC | Age: 43
End: 2023-06-07

## 2023-06-07 ENCOUNTER — NON-APPOINTMENT (OUTPATIENT)
Age: 43
End: 2023-06-07

## 2023-06-07 DIAGNOSIS — C19 MALIGNANT NEOPLASM OF RECTOSIGMOID JUNCTION: ICD-10-CM

## 2023-06-07 DIAGNOSIS — Z51.11 ENCOUNTER FOR ANTINEOPLASTIC CHEMOTHERAPY: ICD-10-CM

## 2023-06-07 DIAGNOSIS — R11.2 NAUSEA WITH VOMITING, UNSPECIFIED: ICD-10-CM

## 2023-06-07 DIAGNOSIS — Z45.2 ENCOUNTER FOR ADJUSTMENT AND MANAGEMENT OF VASCULAR ACCESS DEVICE: ICD-10-CM

## 2023-06-07 DIAGNOSIS — E86.0 DEHYDRATION: ICD-10-CM

## 2023-06-07 LAB
ALBUMIN SERPL ELPH-MCNC: 4.6 G/DL — SIGNIFICANT CHANGE UP (ref 3.3–5)
ALP SERPL-CCNC: 102 U/L — SIGNIFICANT CHANGE UP (ref 40–120)
ALT FLD-CCNC: 10 U/L — SIGNIFICANT CHANGE UP (ref 10–45)
ANION GAP SERPL CALC-SCNC: 12 MMOL/L — SIGNIFICANT CHANGE UP (ref 5–17)
AST SERPL-CCNC: 13 U/L — SIGNIFICANT CHANGE UP (ref 10–40)
BASOPHILS # BLD AUTO: 0.04 K/UL — SIGNIFICANT CHANGE UP (ref 0–0.2)
BASOPHILS NFR BLD AUTO: 0.4 % — SIGNIFICANT CHANGE UP (ref 0–2)
BILIRUB SERPL-MCNC: 0.4 MG/DL — SIGNIFICANT CHANGE UP (ref 0.2–1.2)
BUN SERPL-MCNC: 11 MG/DL — SIGNIFICANT CHANGE UP (ref 7–23)
CALCIUM SERPL-MCNC: 9.9 MG/DL — SIGNIFICANT CHANGE UP (ref 8.4–10.5)
CEA SERPL-MCNC: <0.6 NG/ML — SIGNIFICANT CHANGE UP (ref 0–3.8)
CHLORIDE SERPL-SCNC: 101 MMOL/L — SIGNIFICANT CHANGE UP (ref 96–108)
CO2 SERPL-SCNC: 23 MMOL/L — SIGNIFICANT CHANGE UP (ref 22–31)
CREAT SERPL-MCNC: 0.46 MG/DL — LOW (ref 0.5–1.3)
EGFR: 123 ML/MIN/1.73M2 — SIGNIFICANT CHANGE UP
EOSINOPHIL # BLD AUTO: 0.17 K/UL — SIGNIFICANT CHANGE UP (ref 0–0.5)
EOSINOPHIL NFR BLD AUTO: 1.7 % — SIGNIFICANT CHANGE UP (ref 0–6)
GLUCOSE SERPL-MCNC: 78 MG/DL — SIGNIFICANT CHANGE UP (ref 70–99)
HCT VFR BLD CALC: 34.2 % — LOW (ref 34.5–45)
HGB BLD-MCNC: 10.8 G/DL — LOW (ref 11.5–15.5)
IMM GRANULOCYTES NFR BLD AUTO: 0.3 % — SIGNIFICANT CHANGE UP (ref 0–0.9)
LYMPHOCYTES # BLD AUTO: 1.46 K/UL — SIGNIFICANT CHANGE UP (ref 1–3.3)
LYMPHOCYTES # BLD AUTO: 14.6 % — SIGNIFICANT CHANGE UP (ref 13–44)
MAGNESIUM SERPL-MCNC: 2.1 MG/DL — SIGNIFICANT CHANGE UP (ref 1.6–2.6)
MCHC RBC-ENTMCNC: 23.7 PG — LOW (ref 27–34)
MCHC RBC-ENTMCNC: 31.6 G/DL — LOW (ref 32–36)
MCV RBC AUTO: 75 FL — LOW (ref 80–100)
MONOCYTES # BLD AUTO: 0.56 K/UL — SIGNIFICANT CHANGE UP (ref 0–0.9)
MONOCYTES NFR BLD AUTO: 5.6 % — SIGNIFICANT CHANGE UP (ref 2–14)
NEUTROPHILS # BLD AUTO: 7.76 K/UL — HIGH (ref 1.8–7.4)
NEUTROPHILS NFR BLD AUTO: 77.4 % — HIGH (ref 43–77)
NRBC # BLD: 0 /100 WBCS — SIGNIFICANT CHANGE UP (ref 0–0)
PLATELET # BLD AUTO: 332 K/UL — SIGNIFICANT CHANGE UP (ref 150–400)
POTASSIUM SERPL-MCNC: 4.3 MMOL/L — SIGNIFICANT CHANGE UP (ref 3.5–5.3)
POTASSIUM SERPL-SCNC: 4.3 MMOL/L — SIGNIFICANT CHANGE UP (ref 3.5–5.3)
PROT SERPL-MCNC: 7.2 G/DL — SIGNIFICANT CHANGE UP (ref 6–8.3)
RBC # BLD: 4.56 M/UL — SIGNIFICANT CHANGE UP (ref 3.8–5.2)
RBC # FLD: 21.4 % — HIGH (ref 10.3–14.5)
SODIUM SERPL-SCNC: 136 MMOL/L — SIGNIFICANT CHANGE UP (ref 135–145)
WBC # BLD: 10.02 K/UL — SIGNIFICANT CHANGE UP (ref 3.8–10.5)
WBC # FLD AUTO: 10.02 K/UL — SIGNIFICANT CHANGE UP (ref 3.8–10.5)

## 2023-06-07 RX ORDER — FERROUS GLUCONATE 100 %
1 POWDER (GRAM) MISCELLANEOUS
Refills: 0 | DISCHARGE

## 2023-06-08 PROBLEM — E55.9 VITAMIN D DEFICIENCY, UNSPECIFIED: Chronic | Status: ACTIVE | Noted: 2023-06-06

## 2023-06-08 PROBLEM — I34.1 NONRHEUMATIC MITRAL (VALVE) PROLAPSE: Chronic | Status: ACTIVE | Noted: 2023-06-06

## 2023-06-08 PROBLEM — B37.0 CANDIDAL STOMATITIS: Chronic | Status: ACTIVE | Noted: 2023-06-06

## 2023-06-08 PROBLEM — R91.8 OTHER NONSPECIFIC ABNORMAL FINDING OF LUNG FIELD: Chronic | Status: ACTIVE | Noted: 2023-06-06

## 2023-06-08 PROBLEM — N73.0 ACUTE PARAMETRITIS AND PELVIC CELLULITIS: Chronic | Status: ACTIVE | Noted: 2023-06-06

## 2023-06-09 ENCOUNTER — APPOINTMENT (OUTPATIENT)
Dept: INFUSION THERAPY | Facility: HOSPITAL | Age: 43
End: 2023-06-09

## 2023-06-09 ENCOUNTER — NON-APPOINTMENT (OUTPATIENT)
Age: 43
End: 2023-06-09

## 2023-06-11 DIAGNOSIS — Z51.89 ENCOUNTER FOR OTHER SPECIFIED AFTERCARE: ICD-10-CM

## 2023-06-12 ENCOUNTER — NON-APPOINTMENT (OUTPATIENT)
Age: 43
End: 2023-06-12

## 2023-06-12 RX ORDER — ONDANSETRON 8 MG/1
8 TABLET, ORALLY DISINTEGRATING ORAL
Qty: 21 | Refills: 2 | Status: ACTIVE | COMMUNITY
Start: 2023-06-12 | End: 1900-01-01

## 2023-06-15 ENCOUNTER — NON-APPOINTMENT (OUTPATIENT)
Age: 43
End: 2023-06-15

## 2023-06-16 ENCOUNTER — NON-APPOINTMENT (OUTPATIENT)
Age: 43
End: 2023-06-16

## 2023-06-16 LAB
ALBUMIN SERPL ELPH-MCNC: 4.8 G/DL
ANION GAP SERPL CALC-SCNC: 11 MMOL/L
BUN SERPL-MCNC: 12 MG/DL
CALCIUM SERPL-MCNC: 10.3 MG/DL
CHLORIDE SERPL-SCNC: 101 MMOL/L
CO2 SERPL-SCNC: 25 MMOL/L
CREAT SERPL-MCNC: 0.58 MG/DL
EGFR: 117 ML/MIN/1.73M2
FOLATE SERPL-MCNC: >20 NG/ML
GLUCOSE SERPL-MCNC: 94 MG/DL
PHOSPHATE SERPL-MCNC: 3.6 MG/DL
POTASSIUM SERPL-SCNC: 4.4 MMOL/L
SODIUM SERPL-SCNC: 137 MMOL/L
VIT B12 SERPL-MCNC: >2000 PG/ML

## 2023-06-19 ENCOUNTER — APPOINTMENT (OUTPATIENT)
Dept: OBGYN | Facility: CLINIC | Age: 43
End: 2023-06-19
Payer: COMMERCIAL

## 2023-06-19 ENCOUNTER — NON-APPOINTMENT (OUTPATIENT)
Age: 43
End: 2023-06-19

## 2023-06-19 VITALS
HEIGHT: 63 IN | HEART RATE: 80 BPM | DIASTOLIC BLOOD PRESSURE: 80 MMHG | BODY MASS INDEX: 17.54 KG/M2 | WEIGHT: 99 LBS | OXYGEN SATURATION: 97 % | SYSTOLIC BLOOD PRESSURE: 106 MMHG | TEMPERATURE: 97.4 F

## 2023-06-19 DIAGNOSIS — R23.2 FLUSHING: ICD-10-CM

## 2023-06-19 LAB
HCG UR QL: NEGATIVE
QUALITY CONTROL: YES

## 2023-06-19 PROCEDURE — 99213 OFFICE O/P EST LOW 20 MIN: CPT | Mod: 24

## 2023-06-19 NOTE — HISTORY OF PRESENT ILLNESS
[FreeTextEntry1] : C/O hot flashes and night sweats since January  Started chemo for colon ca on 6/7/23  S/P iron infusions for anemia\par \par  LMP 5/27/23 lasting 3 days, moderate Regular menstrual cycle

## 2023-06-21 ENCOUNTER — APPOINTMENT (OUTPATIENT)
Dept: HEMATOLOGY ONCOLOGY | Facility: CLINIC | Age: 43
End: 2023-06-21
Payer: COMMERCIAL

## 2023-06-21 ENCOUNTER — RESULT REVIEW (OUTPATIENT)
Age: 43
End: 2023-06-21

## 2023-06-21 ENCOUNTER — NON-APPOINTMENT (OUTPATIENT)
Age: 43
End: 2023-06-21

## 2023-06-21 ENCOUNTER — APPOINTMENT (OUTPATIENT)
Dept: INFUSION THERAPY | Facility: HOSPITAL | Age: 43
End: 2023-06-21

## 2023-06-21 VITALS
RESPIRATION RATE: 16 BRPM | OXYGEN SATURATION: 97 % | DIASTOLIC BLOOD PRESSURE: 68 MMHG | HEART RATE: 76 BPM | SYSTOLIC BLOOD PRESSURE: 117 MMHG | TEMPERATURE: 98.4 F

## 2023-06-21 DIAGNOSIS — D50.9 IRON DEFICIENCY ANEMIA, UNSPECIFIED: ICD-10-CM

## 2023-06-21 LAB
ALBUMIN SERPL ELPH-MCNC: 4.3 G/DL — SIGNIFICANT CHANGE UP (ref 3.3–5)
ALP SERPL-CCNC: 170 U/L — HIGH (ref 40–120)
ALT FLD-CCNC: 10 U/L — SIGNIFICANT CHANGE UP (ref 10–45)
ANION GAP SERPL CALC-SCNC: 13 MMOL/L — SIGNIFICANT CHANGE UP (ref 5–17)
AST SERPL-CCNC: 13 U/L — SIGNIFICANT CHANGE UP (ref 10–40)
BASOPHILS # BLD AUTO: 0.15 K/UL — SIGNIFICANT CHANGE UP (ref 0–0.2)
BASOPHILS NFR BLD AUTO: 0.8 % — SIGNIFICANT CHANGE UP (ref 0–2)
BILIRUB SERPL-MCNC: 0.2 MG/DL — SIGNIFICANT CHANGE UP (ref 0.2–1.2)
BUN SERPL-MCNC: 12 MG/DL — SIGNIFICANT CHANGE UP (ref 7–23)
CALCIUM SERPL-MCNC: 9.2 MG/DL — SIGNIFICANT CHANGE UP (ref 8.4–10.5)
CHLORIDE SERPL-SCNC: 104 MMOL/L — SIGNIFICANT CHANGE UP (ref 96–108)
CO2 SERPL-SCNC: 24 MMOL/L — SIGNIFICANT CHANGE UP (ref 22–31)
CREAT SERPL-MCNC: 0.51 MG/DL — SIGNIFICANT CHANGE UP (ref 0.5–1.3)
EGFR: 120 ML/MIN/1.73M2 — SIGNIFICANT CHANGE UP
EOSINOPHIL # BLD AUTO: 0.44 K/UL — SIGNIFICANT CHANGE UP (ref 0–0.5)
EOSINOPHIL NFR BLD AUTO: 2.2 % — SIGNIFICANT CHANGE UP (ref 0–6)
FERRITIN SERPL-MCNC: 75 NG/ML — SIGNIFICANT CHANGE UP (ref 15–150)
FOLATE SERPL-MCNC: >20 NG/ML — SIGNIFICANT CHANGE UP
GLUCOSE SERPL-MCNC: 66 MG/DL — LOW (ref 70–99)
HCT VFR BLD CALC: 36.4 % — SIGNIFICANT CHANGE UP (ref 34.5–45)
HGB BLD-MCNC: 11.4 G/DL — LOW (ref 11.5–15.5)
IMM GRANULOCYTES NFR BLD AUTO: 4.8 % — HIGH (ref 0–0.9)
IRON SATN MFR SERPL: 18 % — SIGNIFICANT CHANGE UP (ref 14–50)
IRON SATN MFR SERPL: 47 UG/DL — SIGNIFICANT CHANGE UP (ref 30–160)
LYMPHOCYTES # BLD AUTO: 12.1 % — LOW (ref 13–44)
LYMPHOCYTES # BLD AUTO: 2.39 K/UL — SIGNIFICANT CHANGE UP (ref 1–3.3)
MAGNESIUM SERPL-MCNC: 2.1 MG/DL — SIGNIFICANT CHANGE UP (ref 1.6–2.6)
MCHC RBC-ENTMCNC: 24.1 PG — LOW (ref 27–34)
MCHC RBC-ENTMCNC: 31.3 G/DL — LOW (ref 32–36)
MCV RBC AUTO: 76.8 FL — LOW (ref 80–100)
MONOCYTES # BLD AUTO: 0.9 K/UL — SIGNIFICANT CHANGE UP (ref 0–0.9)
MONOCYTES NFR BLD AUTO: 4.6 % — SIGNIFICANT CHANGE UP (ref 2–14)
NEUTROPHILS # BLD AUTO: 14.94 K/UL — HIGH (ref 1.8–7.4)
NEUTROPHILS NFR BLD AUTO: 75.5 % — SIGNIFICANT CHANGE UP (ref 43–77)
NRBC # BLD: 0 /100 WBCS — SIGNIFICANT CHANGE UP (ref 0–0)
PLATELET # BLD AUTO: 228 K/UL — SIGNIFICANT CHANGE UP (ref 150–400)
POTASSIUM SERPL-MCNC: 4 MMOL/L — SIGNIFICANT CHANGE UP (ref 3.5–5.3)
POTASSIUM SERPL-SCNC: 4 MMOL/L — SIGNIFICANT CHANGE UP (ref 3.5–5.3)
PROT SERPL-MCNC: 6.5 G/DL — SIGNIFICANT CHANGE UP (ref 6–8.3)
RBC # BLD: 4.74 M/UL — SIGNIFICANT CHANGE UP (ref 3.8–5.2)
RBC # FLD: 23.8 % — HIGH (ref 10.3–14.5)
SODIUM SERPL-SCNC: 141 MMOL/L — SIGNIFICANT CHANGE UP (ref 135–145)
TIBC SERPL-MCNC: 262 UG/DL — SIGNIFICANT CHANGE UP (ref 220–430)
UIBC SERPL-MCNC: 215 UG/DL — SIGNIFICANT CHANGE UP (ref 110–370)
VIT B12 SERPL-MCNC: >2000 PG/ML — HIGH (ref 232–1245)
WBC # BLD: 19.76 K/UL — HIGH (ref 3.8–10.5)
WBC # FLD AUTO: 19.76 K/UL — HIGH (ref 3.8–10.5)

## 2023-06-21 PROCEDURE — 99215 OFFICE O/P EST HI 40 MIN: CPT

## 2023-06-21 RX ORDER — LORAZEPAM 0.5 MG/1
0.5 TABLET ORAL
Qty: 30 | Refills: 0 | Status: ACTIVE | COMMUNITY
Start: 2023-06-21 | End: 1900-01-01

## 2023-06-21 NOTE — ASSESSMENT
[FreeTextEntry1] : CT scan chest/abdomen/pelvis reports, liver pathology report, [facility] genetic testing results, Foundation One results, lab work reviewed.\par \par #1) pT3N1a left colon adenocarcinoma status post left hemicolectomy.  MMR intact.\par Eventually will require full colonoscopy as was incomplete at the time of diagnosis secondary to tumor at 30 cm (scope could not transverse).\par 5/23/2023- CT C/A/P-Bilateral pulmonary nodules which have increased in size since 3/17/2023, compatible with enlarging pulmonary metastases. New hepatic lesion compatible with hepatic metastasis.New wedge-shaped peripheral left lower lobe pole renal lesion, likely representing a renal infarct.\par \par 5/30/3023-Right liver lesion biopsy–invasive moderately differentiated adenocarcinoma in the liver, morphologically compatible with metastasis from colorectal primary.\par No loss of nuclear expression of MMR proteins showing low probability of microsatellite instability–high.\par 6/9/2023-Foundation One-SYLWIA; TMB 6 Muts/Mb. 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 NRAS wildtype. [id number]*s12 and [id number] mutations.  Disease relevant genes with no reportable alterations: BRAF, ERBB2, NRAS.\par \par 5/26/2023 [facility] 47 gene panel negative-results reviewed with patient.\par \par -Signatera ctDNA-pending.\par \par 6/7/2023-Began FOLFOX.\par \par Discussed treatment options with NGS results. Plan to add bevacizumab with next treatment-potential side effects explained including but not limited to N/V/D, allergic reaction, rash, stomatitis, cardiac/ neuro-/BM toxicities, bleeding/clotting, proteinuria, HTN. Patient has given verbal and written consent for treatment.\par Note-patient [place] [facility] [place]concurred with our plans of care.\par \par #2) anemia/iron deficiency–venofer infusions PRN. Hemoglobin improving.\par \par #3) possible renal infarct on CT 5/2023-no related symptoms currently.  Attention on short interval f/u imaging.\par \par #4) anxiety-to try PRN lorazepam-potential side effect profile reviewed. patient also has palliative care consultation scheduled-to assist with symptom management.\par \par Patient was given the opportunity to ask questions.  Her questions have been answered to her apparent satisfaction at this time.  She expressed her understanding and willingness to comply with recommended follow-up.\par \par \par

## 2023-06-21 NOTE — PHYSICAL EXAM
[Fully active, able to carry on all pre-disease performance without restriction] : Status 0 - Fully active, able to carry on all pre-disease performance without restriction [Thin] : thin [Normal] : normal appearance, no rash, nodules, vesicles, ulcers, erythema [de-identified] : non-toxic appearing [de-identified] : soft, NT without palpable hepatosplenomegaly

## 2023-06-21 NOTE — HISTORY OF PRESENT ILLNESS
[Disease: _____________________] : Disease: [unfilled] [T: ___] : T[unfilled] [N: ___] : N[unfilled] [3 - Distress Level] : Distress Level: 3 [de-identified] : Patient presenting at the request of her surgeon for oncology consultation for colon cancer.  \par \par 3/2/2023-Patient presented to the [facility] with LLQ pain.\par --CT chest-Evidence of a few lung nodules and smooth septal lines involving both lungs.\par --CT abdomen/pelvis–left adnexal mass measuring 9 cm with infiltration of the fat adjacent to the mass.  Further evaluation recommended.  Infiltration of the fat adjacent to the mass and multiple lymph nodes measuring up to 8 mm short axis.  Left lower lobe pulmonary nodule measuring 6 mm and partially imaged right lower lobe pulmonary nodule measuring 5 mm.\par Pelvic ultrasound–left ovary is enlarged consistent with the appearance on the CT with a central heterogeneous focus.  This may represent a large hemorrhagic cyst.  Recommend further evaluation with MR imaging.  Intermittent torsion cannot be excluded.  Unremarkable right ovary.  Uterus is remarkable for small fibroid.\par \par 3/3/2023–MRI of the abdomen/pelvis–9 cm left tubo-ovarian abscess.  No acute abdominal findings.  Trace ascites.  \par Patient was transferred to [facility] and treated for sepsis.  Status post drainage of a left TOA.  Discharged from the hospital 3/9/2023.  IR drain removal 3/16/2023.\par \par 3/16/2023–CT chest/abdomen/pelvis–multiple bilateral lung nodules measuring up to 6 mm are indeterminant.  Previously noted 9 cm left ovarian collection decreased, now measuring 2.5 cm.  Mesenteric nodules within the right hemiabdomen measuring up to 1.2 cm, new from 3/2/2023.  Short segment wall masslike wall thickening of the mid sigmoid colon concerning for neoplasm.  New right-sided peritoneal nodularity.  In light of the sigmoid colon mass the lung nodules are highly suspicious for metastatic disease.\par \par 3/30/2023-CT abdomen/pelvis–multiple cystic structures in the left adnexa likely representing improving tubo-ovarian abscesses.  4 cm right adnexal cyst most compatible with a hemorrhagic cyst.\par \par 4/6/2023–Colonoscopy-localized friable polypoid mass with contact bleeding in the distal sigmoid colon at 30 cm.  Unable to traverse the lesion with the colonoscope.  Pathology from the sigmoid colon mass biopsy–fragments of tubulovillous adenoma with focal high-grade dysplasia.\par \par 4/20/2023–Status post partial omentectomy pathology revealing mature adipose tissue with nonspecific congestion and chronic inflammation.  Status post left salpingo-oophorectomy with nonspecific tubo-ovarian abscess.  Status post left partial colectomy with pathology revealing invasive adenocarcinoma, moderately differentiated, 4 cm, invading through the muscularis propria into the pericolonic soft tissue.  No LVI or perineural invasion.  Margins negative.  1/55 lymph nodes positive for metastatic carcinoma.\par \par 5/16/2023-CT C/A/P-Bilateral pulmonary nodules which have increased in size since 3/17/2023, compatible with enlarging pulmonary metastases.\par New hepatic lesion compatible with hepatic metastasis. New wedge-shaped peripheral left lower lobe pole renal lesion, likely representing a renal infarct.\par \par 5/30/3023-Right liver lesion biopsy–invasive moderately differentiated adenocarcinoma in the liver, morphologically compatible with metastasis from colorectal primary.\par No loss of nuclear expression of MMR proteins showing low probability of microsatellite instability–high.\par \par 6/7/2023-Began FOLFOX. [de-identified] : Adenocarcinoma [de-identified] : CEA=<0.6 (4/11/2023) [de-identified] : 6/9/2023-[employer]-SYLWIA; TMB 6 Muts/Mb. 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 NRAS wildtype. [id number]*s12 and [id number] mutations.  Disease relevant genes with no reportable alterations: BRAF, ERBB2, NRAS.\par \par 5/26/2023 Inv[facility]ae 47 gene panel negative. [de-identified] : Currently, no complaints of chest pain, shortness of breath, nausea/vomiting. No fevers.  No bleeding. No current paresthesias.\par Anxious.\par \par  David present.

## 2023-06-22 ENCOUNTER — NON-APPOINTMENT (OUTPATIENT)
Age: 43
End: 2023-06-22

## 2023-06-23 ENCOUNTER — APPOINTMENT (OUTPATIENT)
Dept: INFUSION THERAPY | Facility: HOSPITAL | Age: 43
End: 2023-06-23

## 2023-06-26 ENCOUNTER — LABORATORY RESULT (OUTPATIENT)
Age: 43
End: 2023-06-26

## 2023-06-26 ENCOUNTER — OUTPATIENT (OUTPATIENT)
Dept: OUTPATIENT SERVICES | Facility: HOSPITAL | Age: 43
LOS: 1 days | Discharge: HOME | End: 2023-06-26

## 2023-06-26 DIAGNOSIS — N70.93 SALPINGITIS AND OOPHORITIS, UNSPECIFIED: Chronic | ICD-10-CM

## 2023-06-26 DIAGNOSIS — C18.9 MALIGNANT NEOPLASM OF COLON, UNSPECIFIED: ICD-10-CM

## 2023-06-26 DIAGNOSIS — Z98.890 OTHER SPECIFIED POSTPROCEDURAL STATES: Chronic | ICD-10-CM

## 2023-06-26 DIAGNOSIS — Z90.49 ACQUIRED ABSENCE OF OTHER SPECIFIED PARTS OF DIGESTIVE TRACT: Chronic | ICD-10-CM

## 2023-06-27 ENCOUNTER — LABORATORY RESULT (OUTPATIENT)
Age: 43
End: 2023-06-27

## 2023-06-27 ENCOUNTER — NON-APPOINTMENT (OUTPATIENT)
Age: 43
End: 2023-06-27

## 2023-06-28 ENCOUNTER — APPOINTMENT (OUTPATIENT)
Dept: FAMILY MEDICINE | Facility: CLINIC | Age: 43
End: 2023-06-28
Payer: COMMERCIAL

## 2023-06-28 ENCOUNTER — APPOINTMENT (OUTPATIENT)
Dept: PALLIATIVE MEDICINE | Facility: CLINIC | Age: 43
End: 2023-06-28
Payer: COMMERCIAL

## 2023-06-28 VITALS
TEMPERATURE: 98.6 F | DIASTOLIC BLOOD PRESSURE: 72 MMHG | HEIGHT: 63 IN | RESPIRATION RATE: 14 BRPM | WEIGHT: 95 LBS | OXYGEN SATURATION: 97 % | BODY MASS INDEX: 16.83 KG/M2 | HEART RATE: 100 BPM | SYSTOLIC BLOOD PRESSURE: 100 MMHG

## 2023-06-28 DIAGNOSIS — Z13.1 ENCOUNTER FOR SCREENING FOR DIABETES MELLITUS: ICD-10-CM

## 2023-06-28 DIAGNOSIS — R63.0 ANOREXIA: ICD-10-CM

## 2023-06-28 DIAGNOSIS — Z13.220 ENCOUNTER FOR SCREENING FOR LIPOID DISORDERS: ICD-10-CM

## 2023-06-28 DIAGNOSIS — Z51.5 ENCOUNTER FOR PALLIATIVE CARE: ICD-10-CM

## 2023-06-28 DIAGNOSIS — F41.9 ANXIETY DISORDER, UNSPECIFIED: ICD-10-CM

## 2023-06-28 PROCEDURE — 99386 PREV VISIT NEW AGE 40-64: CPT

## 2023-06-28 PROCEDURE — 99204 OFFICE O/P NEW MOD 45 MIN: CPT | Mod: 95

## 2023-06-28 NOTE — PHYSICAL EXAM
[Soft] : abdomen soft [Coordination Grossly Intact] : coordination grossly intact [Normal] : affect was normal and insight and judgment were intact [de-identified] : had w/ GYN  [de-identified] : slight tenderness to mid left side- she will discuss with heme/onc discussed ER percautions

## 2023-06-28 NOTE — HEALTH RISK ASSESSMENT
[Yes] : Yes [Monthly or less (1 pt)] : Monthly or less (1 point) [1 or 2 (0 pts)] : 1 or 2 (0 points) [Never (0 pts)] : Never (0 points) [No] : In the past 12 months have you used drugs other than those required for medical reasons? No [Never] : Never [Audit-CScore] : 1 [de-identified] : now medical ike

## 2023-06-28 NOTE — ASSESSMENT
[FreeTextEntry1] : 41 yo F PMH metastatic colon adenocarcinoma (s/p left hemicolectomy on chemo), GERD presenting for symptom management.\par \par #Anxiety\par Related heavily to her cancer diagnosis and treatment. Affecting her appetite and causing some weightloss.\par - Medical cannabis certification completed today. Counseled patient on [facility] Medical Cannabis program.\par - Counseled on the proper use of alprazolam as another tool for her anxiety should the cannabis be insufficient for her anxiety\par - Will consider SNRI if anxiety poorly controlled with the above. Spoke with patient about the role of antidepressants in anxiety and she was open to discussing further at next appointment.\par - Agree with her plan to return to work as a way to keep busy and distract from her cancer and treatment\par \par #Poor Appetite\par Mostly due to anxiety. Has a history of eating very little when stressed or anxious.\par - Medical cannabis certification completed today. Counseled patient on [facility] Medical Cannabis program.\par \par F/u in 3 weeks

## 2023-06-28 NOTE — PLAN
[FreeTextEntry1] : -discussed medical history \par -appreciated most recent oncology and palliative note \par \par #HCM \par -she reports discussed mammo with her GYN, to be done in 3 months \par -vaccinations: she will discuss with heme/onc first, aware can receive TDAP in office or at pharmacy\par -had recent blood work, script provided, she will get labs drawn together \par -f/u with derm for skin check \par

## 2023-06-28 NOTE — ASSESSMENT
[FreeTextEntry1] : 41 yo F PMH  metastatic colon adenocarcinoma (s/p left hemicolectomy on chemo), iron def anemia presenting today to establish care.

## 2023-06-28 NOTE — HISTORY OF PRESENT ILLNESS
[FreeTextEntry1] : establish care  [de-identified] : 41 yo F PMH  metastatic colon adenocarcinoma (s/p left hemicolectomy on chemo), iron def anemia presenting today to establish care.  \par She follows w/ Heme/onc (Dr. Massey), GYN (Dr. Donaldson), Palliative (Dr. Everett)\par \par This year was diagnosed with colon cancer, metastatic to liver and suspected to lung as well. She is s/p hemicolectomy, on FOLFOX. She is pending another full colonoscopy. She is also getting iron infusions for anemia. Established care with palliative today to discuss anxiety, she will be using medical marijuana and will consider additional medication such as SSRI/SNRI depending on how she feels in the future on the medical marijuana.  \par \par social: lives with , , never a smoker, no children

## 2023-06-28 NOTE — PHYSICAL EXAM
[General Appearance - Alert] : alert [General Appearance - In No Acute Distress] : in no acute distress [Sclera] : the sclera and conjunctiva were normal [Extraocular Movements] : extraocular movements were intact [Normal Oral Mucosa] : normal oral mucosa [No Oral Pallor] : no oral pallor [No Oral Cyanosis] : no oral cyanosis [Outer Ear] : the ears and nose were normal in appearance [Neck Appearance] : the appearance of the neck was normal [] : no respiratory distress [Respiration, Rhythm And Depth] : normal respiratory rhythm and effort [Oriented To Time, Place, And Person] : oriented to person, place, and time [Impaired Insight] : insight and judgment were intact [Affect] : the affect was normal

## 2023-06-28 NOTE — HISTORY OF PRESENT ILLNESS
[Home] : at home, [unfilled] , at the time of the visit. [Medical Office: ([facility])___] : at the medical office located in  [Verbal consent obtained from patient] : the patient, [unfilled] [FreeTextEntry1] : HPI:\par 41 yo F PMH metastatic colon adenocarcinoma (s/p left hemicolectomy on chemo), GERD presenting for symptom management.\par \par Onc Hx:\par 3/2/2023-Patient presented to the [place] ED with LLQ pain.\par --CT chest-Evidence of a few lung nodules and smooth septal lines involving both lungs.\par --CT abdomen/pelvis–left adnexal mass measuring 9 cm with infiltration of the fat adjacent to the mass. Further evaluation recommended. Infiltration of the fat adjacent to the mass and multiple lymph nodes measuring up to 8 mm short axis. Left lower lobe pulmonary nodule measuring 6 mm and partially imaged right lower lobe pulmonary nodule measuring 5 mm.\par Pelvic ultrasound–left ovary is enlarged consistent with the appearance on the CT with a central heterogeneous focus. This may represent a large hemorrhagic cyst. Recommend further evaluation with MR imaging. Intermittent torsion cannot be excluded. Unremarkable right ovary. Uterus is remarkable for small fibroid.\par \par 3/3/2023–MRI of the abdomen/pelvis–9 cm left tubo-ovarian abscess. No acute abdominal findings. Trace ascites. \par Patient was transferred to [facility] and treated for sepsis. Status post drainage of a left TOA. Discharged from the hospital 3/9/2023. IR drain removal 3/16/2023.\par \par 3/16/2023–CT chest/abdomen/pelvis–multiple bilateral lung nodules measuring up to 6 mm are indeterminant. Previously noted 9 cm left ovarian collection decreased, now measuring 2.5 cm. Mesenteric nodules within the right hemiabdomen measuring up to 1.2 cm, new from 3/2/2023. Short segment wall masslike wall thickening of the mid sigmoid colon concerning for neoplasm. New right-sided peritoneal nodularity. In light of the sigmoid colon mass the lung nodules are highly suspicious for metastatic disease.\par \par 3/30/2023-CT abdomen/pelvis–multiple cystic structures in the left adnexa likely representing improving tubo-ovarian abscesses. 4 cm right adnexal cyst most compatible with a hemorrhagic cyst.\par \par 4/6/2023–Colonoscopy-localized friable polypoid mass with contact bleeding in the distal sigmoid colon at 30 cm. Unable to traverse the lesion with the colonoscope. Pathology from the sigmoid colon mass biopsy–fragments of tubulovillous adenoma with focal high-grade dysplasia.\par \par 4/20/2023–Status post partial omentectomy pathology revealing mature adipose tissue with nonspecific congestion and chronic inflammation. Status post left salpingo-oophorectomy with nonspecific tubo-ovarian abscess. Status post left partial colectomy with pathology revealing invasive adenocarcinoma, moderately differentiated, 4 cm, invading through the muscularis propria into the pericolonic soft tissue. No LVI or perineural invasion. Margins negative. 1/55 lymph nodes positive for metastatic carcinoma.\par \par 5/16/2023-CT C/A/P-Bilateral pulmonary nodules which have increased in size since 3/17/2023, compatible with enlarging pulmonary metastases.\par New hepatic lesion compatible with hepatic metastasis. New wedge-shaped peripheral left lower lobe pole renal lesion, likely representing a renal infarct.\par \par 5/30/3023-Right liver lesion biopsy–invasive moderately differentiated adenocarcinoma in the liver, morphologically compatible with metastasis from colorectal primary.\par No loss of nuclear expression of MMR proteins showing low probability of microsatellite instability–high.\par \par Providers:\par Oncologist - Dr. Contreras\par \par Current Treatment: FOLFOX (started 6/7/23) q21d\par \par SYMPTOMS:\par #Anxiety\par Gets very stressed and anxious related to her cancer treatment. Finds the anxiety worst the day before treatment and the morning of treatment. No panic attacks or difficulty sleeping as a result but feels extreme stress that is hard to get a  on. Was prescribed lorazepam 0.5mg PO but has not tried it and is hesitant to do so because of potential side effects and risk of dependency. Antidepressants were also discussed with her oncologist but she was also hesitant given the risk of side effects.\par \par #Poor appetite\par Very poor appetite when stressed and anxious which has been happening more and more. Now noticing weight loss because of the anxiety.\par \par #N/V\par Mild motion sickness in the car but no nausea at baseline even in first few days after chemo.\par \par #Constipation/Diarrhea\par Took Imodium a few days ago for some abdominal cramping and feels that is why she has been feeling constipated the last few days.\par \par ISTOP Ref #: [id number]\par \par ROS:\par If [ ] blank, symptom not present\par Fatigue [ ]  \par Nausea [ ]  \par Loss of appetite [X] \par Unintentional weight loss [ ]\par Constipation [ ]\par Diarrhea [ ]\par Anxiety [X] \par Low mood [ ] \par Other symptoms:\par [X] All other review of symptoms negative\par \par SH:\par ,  David. Lives in [place]. Parents and twin sister live locally. Brother lives near [place].\par Works as a  in [place] but has been off of work since March. She is josiah on returning next week.\par \par Advanced Directives:\par HCP - David Lyles

## 2023-07-06 ENCOUNTER — APPOINTMENT (OUTPATIENT)
Dept: HEMATOLOGY ONCOLOGY | Facility: CLINIC | Age: 43
End: 2023-07-06
Payer: COMMERCIAL

## 2023-07-06 ENCOUNTER — APPOINTMENT (OUTPATIENT)
Dept: INFUSION THERAPY | Facility: HOSPITAL | Age: 43
End: 2023-07-06

## 2023-07-06 ENCOUNTER — RESULT REVIEW (OUTPATIENT)
Age: 43
End: 2023-07-06

## 2023-07-06 VITALS
DIASTOLIC BLOOD PRESSURE: 69 MMHG | OXYGEN SATURATION: 97 % | TEMPERATURE: 98 F | SYSTOLIC BLOOD PRESSURE: 101 MMHG | HEART RATE: 74 BPM | RESPIRATION RATE: 14 BRPM

## 2023-07-06 DIAGNOSIS — R11.2 NAUSEA WITH VOMITING, UNSPECIFIED: ICD-10-CM

## 2023-07-06 DIAGNOSIS — Z51.11 ENCOUNTER FOR ANTINEOPLASTIC CHEMOTHERAPY: ICD-10-CM

## 2023-07-06 LAB
ALBUMIN SERPL ELPH-MCNC: 4.3 G/DL — SIGNIFICANT CHANGE UP (ref 3.3–5)
ALP SERPL-CCNC: 104 U/L — SIGNIFICANT CHANGE UP (ref 40–120)
ALT FLD-CCNC: 22 U/L — SIGNIFICANT CHANGE UP (ref 10–45)
ANION GAP SERPL CALC-SCNC: 8 MMOL/L — SIGNIFICANT CHANGE UP (ref 5–17)
ANISOCYTOSIS BLD QL: SLIGHT — SIGNIFICANT CHANGE UP
APPEARANCE UR: CLEAR — SIGNIFICANT CHANGE UP
AST SERPL-CCNC: 21 U/L — SIGNIFICANT CHANGE UP (ref 10–40)
BASOPHILS # BLD AUTO: 0.02 K/UL — SIGNIFICANT CHANGE UP (ref 0–0.2)
BASOPHILS NFR BLD AUTO: 0.5 % — SIGNIFICANT CHANGE UP (ref 0–2)
BILIRUB SERPL-MCNC: 0.3 MG/DL — SIGNIFICANT CHANGE UP (ref 0.2–1.2)
BILIRUB UR-MCNC: NEGATIVE — SIGNIFICANT CHANGE UP
BUN SERPL-MCNC: 11 MG/DL — SIGNIFICANT CHANGE UP (ref 7–23)
CALCIUM SERPL-MCNC: 9.4 MG/DL — SIGNIFICANT CHANGE UP (ref 8.4–10.5)
CHLORIDE SERPL-SCNC: 102 MMOL/L — SIGNIFICANT CHANGE UP (ref 96–108)
CO2 SERPL-SCNC: 27 MMOL/L — SIGNIFICANT CHANGE UP (ref 22–31)
COLOR SPEC: YELLOW — SIGNIFICANT CHANGE UP
CREAT SERPL-MCNC: 0.51 MG/DL — SIGNIFICANT CHANGE UP (ref 0.5–1.3)
DIFF PNL FLD: NEGATIVE — SIGNIFICANT CHANGE UP
EGFR: 120 ML/MIN/1.73M2 — SIGNIFICANT CHANGE UP
ELLIPTOCYTES BLD QL SMEAR: SLIGHT — SIGNIFICANT CHANGE UP
EOSINOPHIL # BLD AUTO: 0.34 K/UL — SIGNIFICANT CHANGE UP (ref 0–0.5)
EOSINOPHIL NFR BLD AUTO: 8.5 % — HIGH (ref 0–6)
GLUCOSE SERPL-MCNC: 88 MG/DL — SIGNIFICANT CHANGE UP (ref 70–99)
GLUCOSE UR QL: NEGATIVE MG/DL — SIGNIFICANT CHANGE UP
HCT VFR BLD CALC: 34.6 % — SIGNIFICANT CHANGE UP (ref 34.5–45)
HGB BLD-MCNC: 11.2 G/DL — LOW (ref 11.5–15.5)
KETONES UR-MCNC: NEGATIVE MG/DL — SIGNIFICANT CHANGE UP
LEUKOCYTE ESTERASE UR-ACNC: NEGATIVE — SIGNIFICANT CHANGE UP
LYMPHOCYTES # BLD AUTO: 1.35 K/UL — SIGNIFICANT CHANGE UP (ref 1–3.3)
LYMPHOCYTES # BLD AUTO: 33.5 % — SIGNIFICANT CHANGE UP (ref 13–44)
MAGNESIUM SERPL-MCNC: 2.1 MG/DL — SIGNIFICANT CHANGE UP (ref 1.6–2.6)
MCHC RBC-ENTMCNC: 25.1 PG — LOW (ref 27–34)
MCHC RBC-ENTMCNC: 32.4 G/DL — SIGNIFICANT CHANGE UP (ref 32–36)
MCV RBC AUTO: 77.4 FL — LOW (ref 80–100)
MICROCYTES BLD QL: SLIGHT — SIGNIFICANT CHANGE UP
MONOCYTES # BLD AUTO: 0.61 K/UL — SIGNIFICANT CHANGE UP (ref 0–0.9)
MONOCYTES NFR BLD AUTO: 15 % — HIGH (ref 2–14)
NEUTROPHILS # BLD AUTO: 1.72 K/UL — LOW (ref 1.8–7.4)
NEUTROPHILS NFR BLD AUTO: 42.5 % — LOW (ref 43–77)
NITRITE UR-MCNC: NEGATIVE — SIGNIFICANT CHANGE UP
NRBC # BLD: 0 /100 — SIGNIFICANT CHANGE UP (ref 0–0)
NRBC # BLD: SIGNIFICANT CHANGE UP /100 WBCS (ref 0–0)
PH UR: 7 — SIGNIFICANT CHANGE UP (ref 5–8)
PLAT MORPH BLD: NORMAL — SIGNIFICANT CHANGE UP
PLATELET # BLD AUTO: 187 K/UL — SIGNIFICANT CHANGE UP (ref 150–400)
POIKILOCYTOSIS BLD QL AUTO: SLIGHT — SIGNIFICANT CHANGE UP
POTASSIUM SERPL-MCNC: 4.4 MMOL/L — SIGNIFICANT CHANGE UP (ref 3.5–5.3)
POTASSIUM SERPL-SCNC: 4.4 MMOL/L — SIGNIFICANT CHANGE UP (ref 3.5–5.3)
PROT SERPL-MCNC: 6.2 G/DL — SIGNIFICANT CHANGE UP (ref 6–8.3)
PROT UR-MCNC: NEGATIVE MG/DL — SIGNIFICANT CHANGE UP
RBC # BLD: 4.47 M/UL — SIGNIFICANT CHANGE UP (ref 3.8–5.2)
RBC # FLD: 23.8 % — HIGH (ref 10.3–14.5)
RBC BLD AUTO: ABNORMAL
SCHISTOCYTES BLD QL AUTO: SLIGHT — SIGNIFICANT CHANGE UP
SODIUM SERPL-SCNC: 137 MMOL/L — SIGNIFICANT CHANGE UP (ref 135–145)
SP GR SPEC: 1.01 — SIGNIFICANT CHANGE UP (ref 1–1.03)
UROBILINOGEN FLD QL: 0.2 MG/DL — SIGNIFICANT CHANGE UP (ref 0.2–1)
WBC # BLD: 4.04 K/UL — SIGNIFICANT CHANGE UP (ref 3.8–10.5)
WBC # FLD AUTO: 4.04 K/UL — SIGNIFICANT CHANGE UP (ref 3.8–10.5)

## 2023-07-06 PROCEDURE — 99214 OFFICE O/P EST MOD 30 MIN: CPT

## 2023-07-06 NOTE — HISTORY OF PRESENT ILLNESS
[Disease: _____________________] : Disease: [unfilled] [T: ___] : T[unfilled] [N: ___] : N[unfilled] [3 - Distress Level] : Distress Level: 3 [de-identified] : 3/2/2023-Patient presented to the [place] ED with LLQ pain.\par --CT chest-Evidence of a few lung nodules and smooth septal lines involving both lungs.\par --CT abdomen/pelvis–left adnexal mass measuring 9 cm with infiltration of the fat adjacent to the mass.  Further evaluation recommended.  Infiltration of the fat adjacent to the mass and multiple lymph nodes measuring up to 8 mm short axis.  Left lower lobe pulmonary nodule measuring 6 mm and partially imaged right lower lobe pulmonary nodule measuring 5 mm.\par Pelvic ultrasound–left ovary is enlarged consistent with the appearance on the CT with a central heterogeneous focus.  This may represent a large hemorrhagic cyst.  Recommend further evaluation with MR imaging.  Intermittent torsion cannot be excluded.  Unremarkable right ovary.  Uterus is remarkable for small fibroid.\par \par 3/3/2023–MRI of the abdomen/pelvis–9 cm left tubo-ovarian abscess.  No acute abdominal findings.  Trace ascites.  \par Patient was transferred to [facility] and treated for sepsis.  Status post drainage of a left TOA.  Discharged from the hospital 3/9/2023.  IR drain removal 3/16/2023.\par \par 3/16/2023–CT chest/abdomen/pelvis–multiple bilateral lung nodules measuring up to 6 mm are indeterminant.  Previously noted 9 cm left ovarian collection decreased, now measuring 2.5 cm.  Mesenteric nodules within the right hemiabdomen measuring up to 1.2 cm, new from 3/2/2023.  Short segment wall masslike wall thickening of the mid sigmoid colon concerning for neoplasm.  New right-sided peritoneal nodularity.  In light of the sigmoid colon mass the lung nodules are highly suspicious for metastatic disease.\par \par 3/30/2023-CT abdomen/pelvis–multiple cystic structures in the left adnexa likely representing improving tubo-ovarian abscesses.  4 cm right adnexal cyst most compatible with a hemorrhagic cyst.\par \par 4/6/2023–Colonoscopy-localized friable polypoid mass with contact bleeding in the distal sigmoid colon at 30 cm.  Unable to traverse the lesion with the colonoscope.  Pathology from the sigmoid colon mass biopsy–fragments of tubulovillous adenoma with focal high-grade dysplasia.\par \par 4/20/2023–Status post partial omentectomy pathology revealing mature adipose tissue with nonspecific congestion and chronic inflammation.  Status post left salpingo-oophorectomy with nonspecific tubo-ovarian abscess.  Status post left partial colectomy with pathology revealing invasive adenocarcinoma, moderately differentiated, 4 cm, invading through the muscularis propria into the pericolonic soft tissue.  No LVI or perineural invasion.  Margins negative.  1/55 lymph nodes positive for metastatic carcinoma.\par \par 5/16/2023-CT C/A/P-Bilateral pulmonary nodules which have increased in size since 3/17/2023, compatible with enlarging pulmonary metastases.\par New hepatic lesion compatible with hepatic metastasis. New wedge-shaped peripheral left lower lobe pole renal lesion, likely representing a renal infarct.\par \par 5/30/3023-Right liver lesion biopsy–invasive moderately differentiated adenocarcinoma in the liver, morphologically compatible with metastasis from colorectal primary.\par No loss of nuclear expression of MMR proteins showing low probability of microsatellite instability–high.\par \par 6/7/2023-Began FOLFOX [de-identified] : Adenocarcinoma [de-identified] : CEA=<0.6 (4/11/2023) [de-identified] : 6/9/2023-[employer]-[facility]LWIA; TMB 6 Muts/Mb. 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 NRAS wildtype. [id number]*s12 and [id number] mutations.  Disease relevant genes with no reportable alterations: BRAF, ERBB2, NRAS.\par \par 5/26/2023 Invitae 47 gene panel negative. [de-identified] : Pt here for C3 chemotherapy with Folfox (+ Avastin).  \par Reports experience of tingling and numbness on finger tips and toes after chemotherapy but resolves with gabapentin. \par Denies diarrhea but has occasional constipation. \par She also claims that motion sickness whenever in car 5-7 days post chemo but gets helped by zofran. \par Denies nausea/vomiting, shortness of breath or fever. \par Less anxious than before.\par \par  David present.

## 2023-07-06 NOTE — REVIEW OF SYSTEMS
[Negative] : Allergic/Immunologic [Constipation] : constipation [de-identified] : tingling and numbness on finger tips and toes resolved with gabapentin

## 2023-07-06 NOTE — ASSESSMENT
[FreeTextEntry1] : Lab work reviewed.\par Stage IV left sided colon cancer.\par #1) pT3N1a left colon adenocarcinoma status post left hemicolectomy.  MMR intact.\par Eventually will require full colonoscopy as was incomplete at the time of diagnosis secondary to tumor at 30 cm (scope could not transverse).\par 5/23/2023- CT C/A/P-Bilateral pulmonary nodules which have increased in size since 3/17/2023, compatible with enlarging pulmonary metastases. New hepatic lesion compatible with hepatic metastasis.New wedge-shaped peripheral left lower lobe pole renal lesion, likely representing a renal infarct.\par \par 5/30/3023-Right liver lesion biopsy–invasive moderately differentiated adenocarcinoma in the liver, morphologically compatible with metastasis from colorectal primary.\par No loss of nuclear expression of MMR proteins showing low probability of microsatellite instability–high.\par \par **6/9/2023-[employer]-SYLWIA; TMB 6 Muts/Mb. 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 NRAS wildtype. [id number]*s12 and [id number] mutations.  Disease relevant genes with no reportable alterations: BRAF, ERBB2, NRAS.\par **5/24/2023-[facility]a ctDNA +18.91 MTM/mL.\par **5/26/2023 Invitae 47 gene panel negative-results have been reviewed with patient.\par \par 6/7/2023-Began FOLFOX. Avastin added today. \par \par Note-patient had obtained 2nd opinion at [facility] [place] ([place])-concurred with our plans of care.\par \par #2) anemia/iron deficiency–venofer infusions PRN. Hemoglobin improved on most recent CBC available. Monitor CBC.\par \par #3) possible renal infarct on CT 5/2023-no related symptoms currently.  Attention on short interval f/u imaging.\par \par #4) anxiety-PRN lorazepam. Palliative care f/u-to assist with symptom management.\par \par Patient was given the opportunity to ask questions.  Her questions have been answered to her apparent satisfaction at this time.  She expressed her understanding and willingness to comply with recommended follow-up.\par \par Shared visit with Dr. Massey and NP.Willam \par

## 2023-07-06 NOTE — PHYSICAL EXAM
[Fully active, able to carry on all pre-disease performance without restriction] : Status 0 - Fully active, able to carry on all pre-disease performance without restriction [Thin] : thin [Normal] : affect appropriate [de-identified] : non-toxic appearing [de-identified] : soft, NT without palpable hepatosplenomegaly

## 2023-07-07 ENCOUNTER — NON-APPOINTMENT (OUTPATIENT)
Age: 43
End: 2023-07-07

## 2023-07-08 ENCOUNTER — APPOINTMENT (OUTPATIENT)
Dept: INFUSION THERAPY | Facility: HOSPITAL | Age: 43
End: 2023-07-08

## 2023-07-11 ENCOUNTER — NON-APPOINTMENT (OUTPATIENT)
Age: 43
End: 2023-07-11

## 2023-07-11 DIAGNOSIS — Z51.89 ENCOUNTER FOR OTHER SPECIFIED AFTERCARE: ICD-10-CM

## 2023-07-17 DIAGNOSIS — N95.1 MENOPAUSAL AND FEMALE CLIMACTERIC STATES: ICD-10-CM

## 2023-07-18 ENCOUNTER — NON-APPOINTMENT (OUTPATIENT)
Age: 43
End: 2023-07-18

## 2023-07-19 ENCOUNTER — APPOINTMENT (OUTPATIENT)
Dept: INFUSION THERAPY | Facility: HOSPITAL | Age: 43
End: 2023-07-19

## 2023-07-19 ENCOUNTER — NON-APPOINTMENT (OUTPATIENT)
Age: 43
End: 2023-07-19

## 2023-07-19 ENCOUNTER — RESULT REVIEW (OUTPATIENT)
Age: 43
End: 2023-07-19

## 2023-07-19 ENCOUNTER — APPOINTMENT (OUTPATIENT)
Dept: HEMATOLOGY ONCOLOGY | Facility: CLINIC | Age: 43
End: 2023-07-19

## 2023-07-19 LAB
ALBUMIN SERPL ELPH-MCNC: 4.5 G/DL — SIGNIFICANT CHANGE UP (ref 3.3–5)
ALP SERPL-CCNC: 183 U/L — HIGH (ref 40–120)
ALT FLD-CCNC: 13 U/L — SIGNIFICANT CHANGE UP (ref 10–45)
ANION GAP SERPL CALC-SCNC: 12 MMOL/L — SIGNIFICANT CHANGE UP (ref 5–17)
APPEARANCE UR: CLEAR — SIGNIFICANT CHANGE UP
AST SERPL-CCNC: 22 U/L — SIGNIFICANT CHANGE UP (ref 10–40)
BASOPHILS # BLD AUTO: 0.17 K/UL — SIGNIFICANT CHANGE UP (ref 0–0.2)
BASOPHILS NFR BLD AUTO: 0.8 % — SIGNIFICANT CHANGE UP (ref 0–2)
BILIRUB SERPL-MCNC: 0.2 MG/DL — SIGNIFICANT CHANGE UP (ref 0.2–1.2)
BILIRUB UR-MCNC: NEGATIVE — SIGNIFICANT CHANGE UP
BUN SERPL-MCNC: 12 MG/DL — SIGNIFICANT CHANGE UP (ref 7–23)
CALCIUM SERPL-MCNC: 9.4 MG/DL — SIGNIFICANT CHANGE UP (ref 8.4–10.5)
CHLORIDE SERPL-SCNC: 101 MMOL/L — SIGNIFICANT CHANGE UP (ref 96–108)
CO2 SERPL-SCNC: 24 MMOL/L — SIGNIFICANT CHANGE UP (ref 22–31)
COLOR SPEC: YELLOW — SIGNIFICANT CHANGE UP
CREAT SERPL-MCNC: 0.55 MG/DL — SIGNIFICANT CHANGE UP (ref 0.5–1.3)
DIFF PNL FLD: NEGATIVE — SIGNIFICANT CHANGE UP
EGFR: 118 ML/MIN/1.73M2 — SIGNIFICANT CHANGE UP
EOSINOPHIL # BLD AUTO: 0.66 K/UL — HIGH (ref 0–0.5)
EOSINOPHIL NFR BLD AUTO: 3.2 % — SIGNIFICANT CHANGE UP (ref 0–6)
GLUCOSE SERPL-MCNC: 89 MG/DL — SIGNIFICANT CHANGE UP (ref 70–99)
GLUCOSE UR QL: NEGATIVE MG/DL — SIGNIFICANT CHANGE UP
HCT VFR BLD CALC: 40 % — SIGNIFICANT CHANGE UP (ref 34.5–45)
HGB BLD-MCNC: 13 G/DL — SIGNIFICANT CHANGE UP (ref 11.5–15.5)
IMM GRANULOCYTES NFR BLD AUTO: 4.1 % — HIGH (ref 0–0.9)
KETONES UR-MCNC: NEGATIVE MG/DL — SIGNIFICANT CHANGE UP
LEUKOCYTE ESTERASE UR-ACNC: NEGATIVE — SIGNIFICANT CHANGE UP
LYMPHOCYTES # BLD AUTO: 11.1 % — LOW (ref 13–44)
LYMPHOCYTES # BLD AUTO: 2.31 K/UL — SIGNIFICANT CHANGE UP (ref 1–3.3)
MAGNESIUM SERPL-MCNC: 2.3 MG/DL — SIGNIFICANT CHANGE UP (ref 1.6–2.6)
MCHC RBC-ENTMCNC: 25.6 PG — LOW (ref 27–34)
MCHC RBC-ENTMCNC: 32.5 G/DL — SIGNIFICANT CHANGE UP (ref 32–36)
MCV RBC AUTO: 78.7 FL — LOW (ref 80–100)
MONOCYTES # BLD AUTO: 1.1 K/UL — HIGH (ref 0–0.9)
MONOCYTES NFR BLD AUTO: 5.3 % — SIGNIFICANT CHANGE UP (ref 2–14)
NEUTROPHILS # BLD AUTO: 15.8 K/UL — HIGH (ref 1.8–7.4)
NEUTROPHILS NFR BLD AUTO: 75.5 % — SIGNIFICANT CHANGE UP (ref 43–77)
NITRITE UR-MCNC: NEGATIVE — SIGNIFICANT CHANGE UP
NRBC # BLD: 0 /100 WBCS — SIGNIFICANT CHANGE UP (ref 0–0)
PH UR: 6 — SIGNIFICANT CHANGE UP (ref 5–8)
PLATELET # BLD AUTO: 172 K/UL — SIGNIFICANT CHANGE UP (ref 150–400)
POTASSIUM SERPL-MCNC: 4.4 MMOL/L — SIGNIFICANT CHANGE UP (ref 3.5–5.3)
POTASSIUM SERPL-SCNC: 4.4 MMOL/L — SIGNIFICANT CHANGE UP (ref 3.5–5.3)
PROT SERPL-MCNC: 6.8 G/DL — SIGNIFICANT CHANGE UP (ref 6–8.3)
PROT UR-MCNC: NEGATIVE MG/DL — SIGNIFICANT CHANGE UP
RBC # BLD: 5.08 M/UL — SIGNIFICANT CHANGE UP (ref 3.8–5.2)
RBC # FLD: 25.2 % — HIGH (ref 10.3–14.5)
SODIUM SERPL-SCNC: 137 MMOL/L — SIGNIFICANT CHANGE UP (ref 135–145)
SP GR SPEC: <1.005 — LOW (ref 1–1.03)
UROBILINOGEN FLD QL: 0.2 MG/DL — SIGNIFICANT CHANGE UP (ref 0.2–1)
WBC # BLD: 20.89 K/UL — HIGH (ref 3.8–10.5)
WBC # FLD AUTO: 20.89 K/UL — HIGH (ref 3.8–10.5)

## 2023-07-19 RX ORDER — ACETAMINOPHEN 500 MG
2 TABLET ORAL
Qty: 0 | Refills: 0 | DISCHARGE

## 2023-07-19 RX ORDER — POLYETHYLENE GLYCOL 3350 17 G/17G
17 POWDER, FOR SOLUTION ORAL
Refills: 0 | DISCHARGE

## 2023-07-20 ENCOUNTER — APPOINTMENT (OUTPATIENT)
Dept: PALLIATIVE MEDICINE | Facility: CLINIC | Age: 43
End: 2023-07-20

## 2023-07-20 ENCOUNTER — NON-APPOINTMENT (OUTPATIENT)
Age: 43
End: 2023-07-20

## 2023-07-21 ENCOUNTER — APPOINTMENT (OUTPATIENT)
Dept: INFUSION THERAPY | Facility: HOSPITAL | Age: 43
End: 2023-07-21

## 2023-07-21 RX ORDER — NYSTATIN 100000 [USP'U]/ML
100000 SUSPENSION ORAL 3 TIMES DAILY
Qty: 1 | Refills: 1 | Status: ACTIVE | COMMUNITY
Start: 2023-04-05 | End: 1900-01-01

## 2023-07-25 ENCOUNTER — APPOINTMENT (OUTPATIENT)
Dept: INTERNAL MEDICINE | Facility: CLINIC | Age: 43
End: 2023-07-25
Payer: COMMERCIAL

## 2023-07-25 VITALS
HEIGHT: 63 IN | RESPIRATION RATE: 16 BRPM | WEIGHT: 100 LBS | DIASTOLIC BLOOD PRESSURE: 72 MMHG | HEART RATE: 103 BPM | SYSTOLIC BLOOD PRESSURE: 110 MMHG | TEMPERATURE: 97.9 F | OXYGEN SATURATION: 99 % | BODY MASS INDEX: 17.72 KG/M2

## 2023-07-25 DIAGNOSIS — Z23 ENCOUNTER FOR IMMUNIZATION: ICD-10-CM

## 2023-07-25 DIAGNOSIS — Z00.00 ENCOUNTER FOR GENERAL ADULT MEDICAL EXAMINATION W/OUT ABNORMAL FINDINGS: ICD-10-CM

## 2023-07-25 LAB
25(OH)D3 SERPL-MCNC: 24.8 NG/ML
CHOLEST SERPL-MCNC: 125 MG/DL
ESTIMATED AVERAGE GLUCOSE: 114 MG/DL
HBA1C MFR BLD HPLC: 5.6 %
HDLC SERPL-MCNC: 39 MG/DL
LDLC SERPL CALC-MCNC: 45 MG/DL
NONHDLC SERPL-MCNC: 86 MG/DL
TRIGL SERPL-MCNC: 267 MG/DL

## 2023-07-25 PROCEDURE — 99215 OFFICE O/P EST HI 40 MIN: CPT

## 2023-07-25 NOTE — HISTORY OF PRESENT ILLNESS
[FreeTextEntry1] : Establish care [de-identified] : Most pleasant 42-year-old white female with unfortunate recent diagnosis of stage T3N1A sigmoid adenocarcinoma incidentally found during follow-up CT following treatment for left tubo-ovarian abscess, with radiographic then (liver) biopsy-proven metastases to liver and lung April 2023 started on FOLFOX Avastin chemotherapy June 7, 2023 under the care of Dr. Massey, who comes in for.\par \par She was just seen in the clinic in June to establish care with Dr. Meraz.\par \par Past few months have been hard understandably.  Struggling with anxiety receiving Ativan and weekly counseling and of course the support of her family.  Also struggling with chemo related nausea, stomatitis, neuropathy on uptitrating gabapentin, currently comparatively low-dose 200 mg 3 times daily (normal creatinine).\par \par Recent thyroid lipid panel (mild hypertriglyceridemia low HDL) and vitamin D deficiency 25.

## 2023-07-25 NOTE — ASSESSMENT
[FreeTextEntry1] : -Metastatic colon adenocarcinoma status post left sigmoid colectomy unilateral oophorectomy salpingectomy currently on FOLFOX/Avastin cycle 4 of 6.\par \par - Vitamin D deficiency.  Patient will start over-the-counter 2000 IU vitamin D daily.\par \par – Difficult menstruation.\par – Iron deficiency anemia\par Message sent to oncology and gynecology regarding severe fatigue brain fog resulting in patient missing medications doses for 5 to 6 days during menstruation, to see if menstruation suppression would be of benefit.\par \par Hemoglobin is normalized with Venofer though still microcytic which may in part represent depleted iron stores, chemotherapy effect.  Asked for iron studies with next blood draw\par \par – Chemotherapy-associated neuropathy.  Week on week off FOLFOX Avastin with another month to go.  Recommended that she start 300 mg gabapentin in the morning 200 mg in the afternoon 200 mg in the evening 2 days before chemotherapy starts, and to continue it until symptoms have completely resolved, then stepping down to 100 mg 3 times daily for 48 to 72 hours.\par \par – Immunocompromised host.  Received Tdap recently, Prevnar 20 provided will pursue Shingrix after completes chemotherapy cycle in September.\par \par It was a pleasure to visit with MsSkip Ponce today.  Answered questions best I could.\par \par Disposition follow-up 1 month\par \par Time 40 minutes

## 2023-07-26 ENCOUNTER — NON-APPOINTMENT (OUTPATIENT)
Age: 43
End: 2023-07-26

## 2023-07-28 LAB
ESTRADIOL SERPL-MCNC: 40 PG/ML
FSH SERPL-MCNC: 15.8 IU/L
LH SERPL-ACNC: 11.8 IU/L
TSH SERPL-ACNC: 3.52 UIU/ML

## 2023-07-31 RX ORDER — GABAPENTIN 100 MG/1
100 CAPSULE ORAL
Qty: 90 | Refills: 0 | Status: ACTIVE | COMMUNITY
Start: 2023-06-22 | End: 1900-01-01

## 2023-08-02 ENCOUNTER — RESULT REVIEW (OUTPATIENT)
Age: 43
End: 2023-08-02

## 2023-08-02 ENCOUNTER — NON-APPOINTMENT (OUTPATIENT)
Age: 43
End: 2023-08-02

## 2023-08-02 ENCOUNTER — APPOINTMENT (OUTPATIENT)
Dept: HEMATOLOGY ONCOLOGY | Facility: CLINIC | Age: 43
End: 2023-08-02
Payer: COMMERCIAL

## 2023-08-02 ENCOUNTER — APPOINTMENT (OUTPATIENT)
Dept: INFUSION THERAPY | Facility: HOSPITAL | Age: 43
End: 2023-08-02

## 2023-08-02 VITALS
RESPIRATION RATE: 16 BRPM | OXYGEN SATURATION: 98 % | SYSTOLIC BLOOD PRESSURE: 115 MMHG | TEMPERATURE: 98 F | HEART RATE: 81 BPM | DIASTOLIC BLOOD PRESSURE: 83 MMHG

## 2023-08-02 LAB
ALBUMIN SERPL ELPH-MCNC: 4.6 G/DL — SIGNIFICANT CHANGE UP (ref 3.3–5)
ALP SERPL-CCNC: 213 U/L — HIGH (ref 40–120)
ALT FLD-CCNC: 11 U/L — SIGNIFICANT CHANGE UP (ref 10–45)
ANION GAP SERPL CALC-SCNC: 11 MMOL/L — SIGNIFICANT CHANGE UP (ref 5–17)
ANISOCYTOSIS BLD QL: SLIGHT — SIGNIFICANT CHANGE UP
APPEARANCE UR: CLEAR — SIGNIFICANT CHANGE UP
AST SERPL-CCNC: 19 U/L — SIGNIFICANT CHANGE UP (ref 10–40)
BASOPHILS # BLD AUTO: 0 K/UL — SIGNIFICANT CHANGE UP (ref 0–0.2)
BASOPHILS NFR BLD AUTO: 0 % — SIGNIFICANT CHANGE UP (ref 0–2)
BILIRUB SERPL-MCNC: 0.3 MG/DL — SIGNIFICANT CHANGE UP (ref 0.2–1.2)
BILIRUB UR-MCNC: NEGATIVE — SIGNIFICANT CHANGE UP
BUN SERPL-MCNC: 10 MG/DL — SIGNIFICANT CHANGE UP (ref 7–23)
CALCIUM SERPL-MCNC: 9.9 MG/DL — SIGNIFICANT CHANGE UP (ref 8.4–10.5)
CHLORIDE SERPL-SCNC: 101 MMOL/L — SIGNIFICANT CHANGE UP (ref 96–108)
CO2 SERPL-SCNC: 27 MMOL/L — SIGNIFICANT CHANGE UP (ref 22–31)
COLOR SPEC: YELLOW — SIGNIFICANT CHANGE UP
CREAT SERPL-MCNC: 0.54 MG/DL — SIGNIFICANT CHANGE UP (ref 0.5–1.3)
DIFF PNL FLD: NEGATIVE — SIGNIFICANT CHANGE UP
EGFR: 119 ML/MIN/1.73M2 — SIGNIFICANT CHANGE UP
EOSINOPHIL # BLD AUTO: 1.36 K/UL — HIGH (ref 0–0.5)
EOSINOPHIL NFR BLD AUTO: 7 % — HIGH (ref 0–6)
FERRITIN SERPL-MCNC: 279 NG/ML — HIGH (ref 15–150)
GLUCOSE SERPL-MCNC: 94 MG/DL — SIGNIFICANT CHANGE UP (ref 70–99)
GLUCOSE UR QL: NEGATIVE MG/DL — SIGNIFICANT CHANGE UP
HCT VFR BLD CALC: 39.5 % — SIGNIFICANT CHANGE UP (ref 34.5–45)
HGB BLD-MCNC: 13.2 G/DL — SIGNIFICANT CHANGE UP (ref 11.5–15.5)
IRON SATN MFR SERPL: 123 UG/DL — SIGNIFICANT CHANGE UP (ref 30–160)
IRON SATN MFR SERPL: 44 % — SIGNIFICANT CHANGE UP (ref 14–50)
KETONES UR-MCNC: NEGATIVE MG/DL — SIGNIFICANT CHANGE UP
LEUKOCYTE ESTERASE UR-ACNC: NEGATIVE — SIGNIFICANT CHANGE UP
LYMPHOCYTES # BLD AUTO: 1.94 K/UL — SIGNIFICANT CHANGE UP (ref 1–3.3)
LYMPHOCYTES # BLD AUTO: 10 % — LOW (ref 13–44)
MAGNESIUM SERPL-MCNC: 2.3 MG/DL — SIGNIFICANT CHANGE UP (ref 1.6–2.6)
MCHC RBC-ENTMCNC: 26.9 PG — LOW (ref 27–34)
MCHC RBC-ENTMCNC: 33.4 G/DL — SIGNIFICANT CHANGE UP (ref 32–36)
MCV RBC AUTO: 80.6 FL — SIGNIFICANT CHANGE UP (ref 80–100)
METAMYELOCYTES # FLD: 2.5 % — HIGH (ref 0–0)
MONOCYTES # BLD AUTO: 1.75 K/UL — HIGH (ref 0–0.9)
MONOCYTES NFR BLD AUTO: 9 % — SIGNIFICANT CHANGE UP (ref 2–14)
MYELOCYTES NFR BLD: 2 % — HIGH (ref 0–0)
NEUTROPHILS # BLD AUTO: 13.31 K/UL — HIGH (ref 1.8–7.4)
NEUTROPHILS NFR BLD AUTO: 67.5 % — SIGNIFICANT CHANGE UP (ref 43–77)
NEUTS BAND # BLD: 1 % — SIGNIFICANT CHANGE UP (ref 0–8)
NITRITE UR-MCNC: NEGATIVE — SIGNIFICANT CHANGE UP
NRBC # BLD: 0 /100 — SIGNIFICANT CHANGE UP (ref 0–0)
NRBC # BLD: SIGNIFICANT CHANGE UP /100 WBCS (ref 0–0)
PH UR: 6 — SIGNIFICANT CHANGE UP (ref 5–8)
PLAT MORPH BLD: NORMAL — SIGNIFICANT CHANGE UP
PLATELET # BLD AUTO: 196 K/UL — SIGNIFICANT CHANGE UP (ref 150–400)
POTASSIUM SERPL-MCNC: 4.4 MMOL/L — SIGNIFICANT CHANGE UP (ref 3.5–5.3)
POTASSIUM SERPL-SCNC: 4.4 MMOL/L — SIGNIFICANT CHANGE UP (ref 3.5–5.3)
PROMYELOCYTES # FLD: 1 % — HIGH (ref 0–0)
PROT SERPL-MCNC: 7.3 G/DL — SIGNIFICANT CHANGE UP (ref 6–8.3)
PROT UR-MCNC: NEGATIVE MG/DL — SIGNIFICANT CHANGE UP
RBC # BLD: 4.9 M/UL — SIGNIFICANT CHANGE UP (ref 3.8–5.2)
RBC # FLD: 25.2 % — HIGH (ref 10.3–14.5)
RBC BLD AUTO: SIGNIFICANT CHANGE UP
SODIUM SERPL-SCNC: 138 MMOL/L — SIGNIFICANT CHANGE UP (ref 135–145)
SP GR SPEC: 1.02 — SIGNIFICANT CHANGE UP (ref 1–1.03)
TIBC SERPL-MCNC: 276 UG/DL — SIGNIFICANT CHANGE UP (ref 220–430)
UIBC SERPL-MCNC: 153 UG/DL — SIGNIFICANT CHANGE UP (ref 110–370)
UROBILINOGEN FLD QL: 0.2 MG/DL — SIGNIFICANT CHANGE UP (ref 0.2–1)
WBC # BLD: 19.43 K/UL — HIGH (ref 3.8–10.5)
WBC # FLD AUTO: 19.43 K/UL — HIGH (ref 3.8–10.5)

## 2023-08-02 PROCEDURE — 99214 OFFICE O/P EST MOD 30 MIN: CPT

## 2023-08-02 NOTE — ASSESSMENT
[FreeTextEntry1] : Lab work, 7/25/23 medicine note reviewed. Stage IV left sided colon cancer. #1) pT3N1a left colon adenocarcinoma status post left hemicolectomy.  MMR intact. Eventually will require full colonoscopy as was incomplete at the time of diagnosis secondary to tumor at 30 cm (scope could not transverse). 5/23/2023- CT C/A/P-Bilateral pulmonary nodules which have increased in size since 3/17/2023, compatible with enlarging pulmonary metastases. New hepatic lesion compatible with hepatic metastasis.New wedge-shaped peripheral left lower lobe pole renal lesion, likely representing a renal infarct.  5/30/3023-Right liver lesion biopsy-invasive moderately differentiated adenocarcinoma in the liver, morphologically compatible with metastasis from colorectal primary. No loss of nuclear expression of MMR proteins showing low probability of microsatellite instability-high.  **6/9/2023-[facility]-SYLWIA; TMB 6 Muts/Mb. 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 NRAS wildtype. [id number]*s12 and [id number] mutations.  Disease relevant genes with no reportable alterations: BRAF, ERBB2, NRAS. **5/24/2023-Signatera ctDNA +18.91 MTM/mL. **5/26/2023 Invitae 47 gene panel negative-results have been reviewed with patient.  6/7/2023-Began FOLFOX. Avastin added. Clinically stable for treatment today, to which patient consents.   Note-patient had obtained 2nd opinion at [facility] [place] (Dr. Mansfield)-concurred with our plans of care.  #2) anemia/iron deficiency-venofer infusions PRN. Hemoglobin WNL today. Monitor CBC.  #3) possible renal infarct on CT 5/2023-no related symptoms currently.  Attention on short interval f/u imaging.  #4) anxiety-PRN lorazepam. Palliative care f/u-to assist with symptom management.  Patient was given the opportunity to ask questions.  Her questions have been answered to her apparent satisfaction at this time.  She expressed her understanding and willingness to comply with recommended follow-up.  --Cycle #5 FOLFOX/Avastin. RTO 2 weeks.

## 2023-08-02 NOTE — HISTORY OF PRESENT ILLNESS
[Disease: _____________________] : Disease: [unfilled] [T: ___] : T[unfilled] [N: ___] : N[unfilled] [3 - Distress Level] : Distress Level: 3 [de-identified] : 3/2/2023-Patient presented to the [place] ED with LLQ pain.\par  --CT chest-Evidence of a few lung nodules and smooth septal lines involving both lungs.\par  --CT abdomen/pelvis-left adnexal mass measuring 9 cm with infiltration of the fat adjacent to the mass.  Further evaluation recommended.  Infiltration of the fat adjacent to the mass and multiple lymph nodes measuring up to 8 mm short axis.  Left lower lobe pulmonary nodule measuring 6 mm and partially imaged right lower lobe pulmonary nodule measuring 5 mm.\par  Pelvic ultrasound-left ovary is enlarged consistent with the appearance on the CT with a central heterogeneous focus.  This may represent a large hemorrhagic cyst.  Recommend further evaluation with MR imaging.  Intermittent torsion cannot be excluded.  Unremarkable right ovary.  Uterus is remarkable for small fibroid.\par  \par  3/3/2023-MRI of the abdomen/pelvis-9 cm left tubo-ovarian abscess.  No acute abdominal findings.  Trace ascites.  \par  Patient was transferred to [facility] and treated for sepsis.  Status post drainage of a left TOA.  Discharged from the hospital 3/9/2023.  IR drain removal 3/16/2023.\par  \par  3/16/2023-CT chest/abdomen/pelvis-multiple bilateral lung nodules measuring up to 6 mm are indeterminant.  Previously noted 9 cm left ovarian collection decreased, now measuring 2.5 cm.  Mesenteric nodules within the right hemiabdomen measuring up to 1.2 cm, new from 3/2/2023.  Short segment wall masslike wall thickening of the mid sigmoid colon concerning for neoplasm.  New right-sided peritoneal nodularity.  In light of the sigmoid colon mass the lung nodules are highly suspicious for metastatic disease.\par  \par  3/30/2023-CT abdomen/pelvis-multiple cystic structures in the left adnexa likely representing improving tubo-ovarian abscesses.  4 cm right adnexal cyst most compatible with a hemorrhagic cyst.\par  \par  4/6/20239251-Itwogspagus-sikjxwhme friable polypoid mass with contact bleeding in the distal sigmoid colon at 30 cm.  Unable to traverse the lesion with the colonoscope.  Pathology from the sigmoid colon mass biopsy-fragments of tubulovillous adenoma with focal high-grade dysplasia.\par  \par  4/20/2023-Status post partial omentectomy pathology revealing mature adipose tissue with nonspecific congestion and chronic inflammation.  Status post left salpingo-oophorectomy with nonspecific tubo-ovarian abscess.  Status post left partial colectomy with pathology revealing invasive adenocarcinoma, moderately differentiated, 4 cm, invading through the muscularis propria into the pericolonic soft tissue.  No LVI or perineural invasion.  Margins negative.  1/55 lymph nodes positive for metastatic carcinoma.\par  \par  5/16/2023-CT C/A/P-Bilateral pulmonary nodules which have increased in size since 3/17/2023, compatible with enlarging pulmonary metastases.\par  New hepatic lesion compatible with hepatic metastasis. New wedge-shaped peripheral left lower lobe pole renal lesion, likely representing a renal infarct.\par  \par  5/30/3023-Right liver lesion biopsy-invasive moderately differentiated adenocarcinoma in the liver, morphologically compatible with metastasis from colorectal primary.\par  No loss of nuclear expression of MMR proteins showing low probability of microsatellite instability-high.\par  \par  6/7/2023-Began FOLFOX [de-identified] : Adenocarcinoma [de-identified] : CEA=<0.6 (4/11/2023) [de-identified] : 6/9/2023-[facility]-SYLWIA; TMB 6 Muts/Mb. 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 NRAS wildtype. [id number]*s12 and [id number] mutations.  Disease relevant genes with no reportable alterations: BRAF, ERBB2, NRAS.\par  \par  5/26/2023 Invitae 47 gene panel negative. [de-identified] : Feeling well today. Ran a mile yesterday. Bikes miles at a time. Plans f/u with GYN MD for consideration of medical ovarian ablation-has been having irregular menses with overwhelming fatigue during periods with decreased QOL. No N/V/D, abdominal pain. No current bleeding.  Identical twin sister present. present.

## 2023-08-02 NOTE — REVIEW OF SYSTEMS
[Constipation] : constipation [Negative] : Allergic/Immunologic [FreeTextEntry7] : occasional constipation relieved with miralax [de-identified] : tingling and numbness on finger tips and toes resolved with gabapentin

## 2023-08-02 NOTE — PHYSICAL EXAM
[Fully active, able to carry on all pre-disease performance without restriction] : Status 0 - Fully active, able to carry on all pre-disease performance without restriction [Thin] : thin [Normal] : affect appropriate [de-identified] : non-toxic appearing [de-identified] : soft, NT without palpable hepatosplenomegaly

## 2023-08-04 ENCOUNTER — APPOINTMENT (OUTPATIENT)
Dept: INFUSION THERAPY | Facility: HOSPITAL | Age: 43
End: 2023-08-04

## 2023-08-07 ENCOUNTER — APPOINTMENT (OUTPATIENT)
Dept: INFUSION THERAPY | Facility: HOSPITAL | Age: 43
End: 2023-08-07

## 2023-08-16 ENCOUNTER — RESULT REVIEW (OUTPATIENT)
Age: 43
End: 2023-08-16

## 2023-08-16 ENCOUNTER — APPOINTMENT (OUTPATIENT)
Dept: INFUSION THERAPY | Facility: HOSPITAL | Age: 43
End: 2023-08-16

## 2023-08-16 ENCOUNTER — APPOINTMENT (OUTPATIENT)
Dept: HEMATOLOGY ONCOLOGY | Facility: CLINIC | Age: 43
End: 2023-08-16
Payer: COMMERCIAL

## 2023-08-16 VITALS
RESPIRATION RATE: 16 BRPM | SYSTOLIC BLOOD PRESSURE: 122 MMHG | OXYGEN SATURATION: 96 % | DIASTOLIC BLOOD PRESSURE: 88 MMHG | HEART RATE: 77 BPM | TEMPERATURE: 98.1 F

## 2023-08-16 LAB
ALBUMIN SERPL ELPH-MCNC: 4.3 G/DL — SIGNIFICANT CHANGE UP (ref 3.3–5)
ALP SERPL-CCNC: 224 U/L — HIGH (ref 40–120)
ALT FLD-CCNC: 9 U/L — LOW (ref 10–45)
ANION GAP SERPL CALC-SCNC: 10 MMOL/L — SIGNIFICANT CHANGE UP (ref 5–17)
ANISOCYTOSIS BLD QL: SLIGHT — SIGNIFICANT CHANGE UP
APPEARANCE UR: CLEAR — SIGNIFICANT CHANGE UP
AST SERPL-CCNC: 24 U/L — SIGNIFICANT CHANGE UP (ref 10–40)
BASOPHILS # BLD AUTO: 0 K/UL — SIGNIFICANT CHANGE UP (ref 0–0.2)
BASOPHILS NFR BLD AUTO: 0 % — SIGNIFICANT CHANGE UP (ref 0–2)
BILIRUB SERPL-MCNC: 0.2 MG/DL — SIGNIFICANT CHANGE UP (ref 0.2–1.2)
BILIRUB UR-MCNC: NEGATIVE — SIGNIFICANT CHANGE UP
BUN SERPL-MCNC: 11 MG/DL — SIGNIFICANT CHANGE UP (ref 7–23)
CALCIUM SERPL-MCNC: 9.5 MG/DL — SIGNIFICANT CHANGE UP (ref 8.4–10.5)
CHLORIDE SERPL-SCNC: 102 MMOL/L — SIGNIFICANT CHANGE UP (ref 96–108)
CO2 SERPL-SCNC: 26 MMOL/L — SIGNIFICANT CHANGE UP (ref 22–31)
COLOR SPEC: YELLOW — SIGNIFICANT CHANGE UP
CREAT SERPL-MCNC: 0.5 MG/DL — SIGNIFICANT CHANGE UP (ref 0.5–1.3)
DIFF PNL FLD: NEGATIVE — SIGNIFICANT CHANGE UP
DOHLE BOD BLD QL SMEAR: PRESENT — SIGNIFICANT CHANGE UP
EGFR: 121 ML/MIN/1.73M2 — SIGNIFICANT CHANGE UP
ELLIPTOCYTES BLD QL SMEAR: SLIGHT — SIGNIFICANT CHANGE UP
EOSINOPHIL # BLD AUTO: 1.65 K/UL — HIGH (ref 0–0.5)
EOSINOPHIL NFR BLD AUTO: 7.5 % — HIGH (ref 0–6)
FERRITIN SERPL-MCNC: 207 NG/ML — HIGH (ref 15–150)
GLUCOSE SERPL-MCNC: 104 MG/DL — HIGH (ref 70–99)
GLUCOSE UR QL: NEGATIVE MG/DL — SIGNIFICANT CHANGE UP
HCT VFR BLD CALC: 37 % — SIGNIFICANT CHANGE UP (ref 34.5–45)
HGB BLD-MCNC: 12.5 G/DL — SIGNIFICANT CHANGE UP (ref 11.5–15.5)
IRON SATN MFR SERPL: 125 UG/DL — SIGNIFICANT CHANGE UP (ref 30–160)
IRON SATN MFR SERPL: 49 % — SIGNIFICANT CHANGE UP (ref 14–50)
KETONES UR-MCNC: NEGATIVE MG/DL — SIGNIFICANT CHANGE UP
LEUKOCYTE ESTERASE UR-ACNC: NEGATIVE — SIGNIFICANT CHANGE UP
LYMPHOCYTES # BLD AUTO: 11.5 % — LOW (ref 13–44)
LYMPHOCYTES # BLD AUTO: 2.52 K/UL — SIGNIFICANT CHANGE UP (ref 1–3.3)
MAGNESIUM SERPL-MCNC: 2.2 MG/DL — SIGNIFICANT CHANGE UP (ref 1.6–2.6)
MCHC RBC-ENTMCNC: 28.1 PG — SIGNIFICANT CHANGE UP (ref 27–34)
MCHC RBC-ENTMCNC: 33.8 G/DL — SIGNIFICANT CHANGE UP (ref 32–36)
MCV RBC AUTO: 83.1 FL — SIGNIFICANT CHANGE UP (ref 80–100)
METAMYELOCYTES # FLD: 1.5 % — HIGH (ref 0–0)
MONOCYTES # BLD AUTO: 1.54 K/UL — HIGH (ref 0–0.9)
MONOCYTES NFR BLD AUTO: 7 % — SIGNIFICANT CHANGE UP (ref 2–14)
MYELOCYTES NFR BLD: 1 % — HIGH (ref 0–0)
NEUTROPHILS # BLD AUTO: 15.69 K/UL — HIGH (ref 1.8–7.4)
NEUTROPHILS NFR BLD AUTO: 70.5 % — SIGNIFICANT CHANGE UP (ref 43–77)
NEUTS BAND # BLD: 1 % — SIGNIFICANT CHANGE UP (ref 0–8)
NITRITE UR-MCNC: NEGATIVE — SIGNIFICANT CHANGE UP
NRBC # BLD: 0 /100 — SIGNIFICANT CHANGE UP (ref 0–0)
NRBC # BLD: SIGNIFICANT CHANGE UP /100 WBCS (ref 0–0)
PH UR: 6 — SIGNIFICANT CHANGE UP (ref 5–8)
PLAT MORPH BLD: NORMAL — SIGNIFICANT CHANGE UP
PLATELET # BLD AUTO: 169 K/UL — SIGNIFICANT CHANGE UP (ref 150–400)
POIKILOCYTOSIS BLD QL AUTO: SLIGHT — SIGNIFICANT CHANGE UP
POTASSIUM SERPL-MCNC: 4 MMOL/L — SIGNIFICANT CHANGE UP (ref 3.5–5.3)
POTASSIUM SERPL-SCNC: 4 MMOL/L — SIGNIFICANT CHANGE UP (ref 3.5–5.3)
PROT SERPL-MCNC: 6.6 G/DL — SIGNIFICANT CHANGE UP (ref 6–8.3)
PROT UR-MCNC: NEGATIVE MG/DL — SIGNIFICANT CHANGE UP
RBC # BLD: 4.45 M/UL — SIGNIFICANT CHANGE UP (ref 3.8–5.2)
RBC # FLD: 24.1 % — HIGH (ref 10.3–14.5)
RBC BLD AUTO: ABNORMAL
SODIUM SERPL-SCNC: 138 MMOL/L — SIGNIFICANT CHANGE UP (ref 135–145)
SP GR SPEC: 1.01 — SIGNIFICANT CHANGE UP (ref 1–1.03)
TIBC SERPL-MCNC: 256 UG/DL — SIGNIFICANT CHANGE UP (ref 220–430)
UIBC SERPL-MCNC: 131 UG/DL — SIGNIFICANT CHANGE UP (ref 110–370)
UROBILINOGEN FLD QL: 0.2 MG/DL — SIGNIFICANT CHANGE UP (ref 0.2–1)
WBC # BLD: 21.95 K/UL — HIGH (ref 3.8–10.5)
WBC # FLD AUTO: 21.95 K/UL — HIGH (ref 3.8–10.5)

## 2023-08-16 PROCEDURE — 99214 OFFICE O/P EST MOD 30 MIN: CPT

## 2023-08-16 NOTE — REASON FOR VISIT
[Initial Consultation] : an initial consultation [Spouse] : spouse [Parent] : parent [FreeTextEntry2] : colon cancer

## 2023-08-16 NOTE — ASSESSMENT
[FreeTextEntry1] : Lab work reviewed. Stage IV left sided colon cancer. #1) pT3N1a left colon adenocarcinoma status post left hemicolectomy.  MMR intact. Eventually will require full colonoscopy as was incomplete at the time of diagnosis secondary to tumor at 30 cm (scope could not transverse). 5/23/2023- CT C/A/P-Bilateral pulmonary nodules which have increased in size since 3/17/2023, compatible with enlarging pulmonary metastases. New hepatic lesion compatible with hepatic metastasis.New wedge-shaped peripheral left lower lobe pole renal lesion, likely representing a renal infarct.  5/30/3023-Right liver lesion biopsy-invasive moderately differentiated adenocarcinoma in the liver, morphologically compatible with metastasis from colorectal primary. No loss of nuclear expression of MMR proteins showing low probability of microsatellite instability-high.  **6/9/2023-[facility]-SYLWIA; TMB 6 Muts/Mb. 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 NRAS wildtype. [id number]*s12 and [id number] mutations.  Disease relevant genes with no reportable alterations: BRAF, ERBB2, NRAS. **5/24/2023-Signatera ctDNA +18.91 MTM/mL. **5/26/2023 Invitae 47 gene panel negative-results have been reviewed with patient.  6/7/2023-Began FOLFOX. Avastin added. Clinically stable for treatment today, to which patient consents.  --Next imaging ordered.  Note-patient had obtained 2nd opinion at [facility] (Dr. Mansfield)-concurred with our plans of care.  #2) anemia/iron deficiency-venofer infusions PRN. Hemoglobin WNL today. Monitor CBC.  #3) h/o possible renal infarct on CT 5/2023-no related symptoms currently.  Attention on short interval f/u imaging.  #4) anxiety-PRN lorazepam. Palliative care f/u-to assist with symptom management.  Patient was given the opportunity to ask questions.  Her questions have been answered to her apparent satisfaction at this time.  She expressed her understanding and willingness to comply with recommended follow-up.  -->FOLFOX-Oxaliplatin 85 mg per metered squared IV day 1, Leucovorin 400 mg per metered squared IV day 1, 5-Fluorouracil 400 mg per metered squared IV bolus day 1, followed by 2400 mg per metered square CIV over 48 hours.  Regimen to be given every 2 weeks. Cycle #6 today. Onpro support. Bevacizumab 5 mg/kg IV day 1 every 2 weeks.  RTO 2 weeks.

## 2023-08-16 NOTE — HISTORY OF PRESENT ILLNESS
[Disease: _____________________] : Disease: [unfilled] [T: ___] : T[unfilled] [N: ___] : N[unfilled] [de-identified] : 3/2/2023-Patient presented to the [place] ED with LLQ pain. --CT chest-Evidence of a few lung nodules and smooth septal lines involving both lungs. --CT abdomen/pelvis-left adnexal mass measuring 9 cm with infiltration of the fat adjacent to the mass.  Further evaluation recommended.  Infiltration of the fat adjacent to the mass and multiple lymph nodes measuring up to 8 mm short axis.  Left lower lobe pulmonary nodule measuring 6 mm and partially imaged right lower lobe pulmonary nodule measuring 5 mm. Pelvic ultrasound-left ovary is enlarged consistent with the appearance on the CT with a central heterogeneous focus.  This may represent a large hemorrhagic cyst.  Recommend further evaluation with MR imaging.  Intermittent torsion cannot be excluded.  Unremarkable right ovary.  Uterus is remarkable for small fibroid.  3/3/2023-MRI of the abdomen/pelvis-9 cm left tubo-ovarian abscess.  No acute abdominal findings.  Trace ascites.   Patient was transferred to [facility] and treated for sepsis.  Status post drainage of a left TOA.  Discharged from the hospital 3/9/2023.  IR drain removal 3/16/2023.  3/16/2023-CT chest/abdomen/pelvis-multiple bilateral lung nodules measuring up to 6 mm are indeterminant.  Previously noted 9 cm left ovarian collection decreased, now measuring 2.5 cm.  Mesenteric nodules within the right hemiabdomen measuring up to 1.2 cm, new from 3/2/2023.  Short segment wall masslike wall thickening of the mid sigmoid colon concerning for neoplasm.  New right-sided peritoneal nodularity.  In light of the sigmoid colon mass the lung nodules are highly suspicious for metastatic disease.  3/30/2023-CT abdomen/pelvis-multiple cystic structures in the left adnexa likely representing improving tubo-ovarian abscesses.  4 cm right adnexal cyst most compatible with a hemorrhagic cyst.  4/6/20234934-Kivblgkwdzo-lkpxizgne friable polypoid mass with contact bleeding in the distal sigmoid colon at 30 cm.  Unable to traverse the lesion with the colonoscope.  Pathology from the sigmoid colon mass biopsy-fragments of tubulovillous adenoma with focal high-grade dysplasia.  4/20/2023-Status post partial omentectomy pathology revealing mature adipose tissue with nonspecific congestion and chronic inflammation.  Status post left salpingo-oophorectomy with nonspecific tubo-ovarian abscess.  Status post left partial colectomy with pathology revealing invasive adenocarcinoma, moderately differentiated, 4 cm, invading through the muscularis propria into the pericolonic soft tissue.  No LVI or perineural invasion.  Margins negative.  1/55 lymph nodes positive for metastatic carcinoma.  5/16/2023-CT C/A/P-Bilateral pulmonary nodules which have increased in size since 3/17/2023, compatible with enlarging pulmonary metastases. New hepatic lesion compatible with hepatic metastasis. New wedge-shaped peripheral left lower lobe pole renal lesion, likely representing a renal infarct.  5/30/3023-Right liver lesion biopsy-invasive moderately differentiated adenocarcinoma in the liver, morphologically compatible with metastasis from colorectal primary. No loss of nuclear expression of MMR proteins showing low probability of microsatellite instability-high.  6/7/2023-Began FOLFOX with Avastin (added 7/6/2023 once NGS back). [de-identified] : Adenocarcinoma [de-identified] : CEA=<0.6 (4/11/2023) [de-identified] : 6/9/2023-[facility]-SYLWIA; TMB 6 Muts/Mb. 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 NRAS wildtype. [id number]*s12 and [id number] mutations.  Disease relevant genes with no reportable alterations: BRAF, ERBB2, NRAS.\par  \par  5/26/2023 Invitae 47 gene panel negative. [de-identified] : Feels well. Has f/u planned with Dr. Donaldson to decide regarding rendering patient post-menopausal medically, as she is "unable to tolerate" her menses at this time. No N/V/D, abdominal pain. No current bleeding.  Mother present.

## 2023-08-16 NOTE — REVIEW OF SYSTEMS
[Negative] : Allergic/Immunologic [FreeTextEntry7] : occasional constipation relieved with miralax [de-identified] : tingling and numbness on finger tips and toes-not worse

## 2023-08-18 ENCOUNTER — NON-APPOINTMENT (OUTPATIENT)
Age: 43
End: 2023-08-18

## 2023-08-18 ENCOUNTER — APPOINTMENT (OUTPATIENT)
Dept: INFUSION THERAPY | Facility: HOSPITAL | Age: 43
End: 2023-08-18

## 2023-08-18 ENCOUNTER — RESULT REVIEW (OUTPATIENT)
Age: 43
End: 2023-08-18

## 2023-08-18 DIAGNOSIS — N94.6 DYSMENORRHEA, UNSPECIFIED: ICD-10-CM

## 2023-08-18 RX ORDER — NYSTATIN 100000 [USP'U]/ML
100000 SUSPENSION ORAL
Qty: 200 | Refills: 0 | Status: ACTIVE | COMMUNITY
Start: 2023-06-12 | End: 1900-01-01

## 2023-08-18 RX ORDER — DROSPIRENONE 4 MG/1
4 TABLET, FILM COATED ORAL
Qty: 3 | Refills: 3 | Status: ACTIVE | COMMUNITY
Start: 2023-08-18 | End: 1900-01-01

## 2023-08-22 ENCOUNTER — OUTPATIENT (OUTPATIENT)
Dept: OUTPATIENT SERVICES | Facility: HOSPITAL | Age: 43
LOS: 1 days | Discharge: HOME | End: 2023-08-22

## 2023-08-22 DIAGNOSIS — C18.9 MALIGNANT NEOPLASM OF COLON, UNSPECIFIED: ICD-10-CM

## 2023-08-22 DIAGNOSIS — Z90.49 ACQUIRED ABSENCE OF OTHER SPECIFIED PARTS OF DIGESTIVE TRACT: Chronic | ICD-10-CM

## 2023-08-22 DIAGNOSIS — N70.93 SALPINGITIS AND OOPHORITIS, UNSPECIFIED: Chronic | ICD-10-CM

## 2023-08-22 DIAGNOSIS — Z98.890 OTHER SPECIFIED POSTPROCEDURAL STATES: Chronic | ICD-10-CM

## 2023-08-23 ENCOUNTER — OUTPATIENT (OUTPATIENT)
Dept: OUTPATIENT SERVICES | Facility: HOSPITAL | Age: 43
LOS: 1 days | End: 2023-08-23
Payer: COMMERCIAL

## 2023-08-23 ENCOUNTER — APPOINTMENT (OUTPATIENT)
Dept: CT IMAGING | Facility: HOSPITAL | Age: 43
End: 2023-08-23
Payer: COMMERCIAL

## 2023-08-23 DIAGNOSIS — C18.9 MALIGNANT NEOPLASM OF COLON, UNSPECIFIED: ICD-10-CM

## 2023-08-23 DIAGNOSIS — Z90.49 ACQUIRED ABSENCE OF OTHER SPECIFIED PARTS OF DIGESTIVE TRACT: Chronic | ICD-10-CM

## 2023-08-23 DIAGNOSIS — N70.93 SALPINGITIS AND OOPHORITIS, UNSPECIFIED: Chronic | ICD-10-CM

## 2023-08-23 DIAGNOSIS — Z98.890 OTHER SPECIFIED POSTPROCEDURAL STATES: Chronic | ICD-10-CM

## 2023-08-23 PROCEDURE — 74177 CT ABD & PELVIS W/CONTRAST: CPT

## 2023-08-23 PROCEDURE — 74177 CT ABD & PELVIS W/CONTRAST: CPT | Mod: 26

## 2023-08-23 PROCEDURE — 71260 CT THORAX DX C+: CPT | Mod: 26

## 2023-08-23 PROCEDURE — 71260 CT THORAX DX C+: CPT

## 2023-08-25 ENCOUNTER — APPOINTMENT (OUTPATIENT)
Dept: INTERNAL MEDICINE | Facility: CLINIC | Age: 43
End: 2023-08-25
Payer: COMMERCIAL

## 2023-08-25 VITALS
HEIGHT: 63 IN | SYSTOLIC BLOOD PRESSURE: 110 MMHG | HEART RATE: 89 BPM | TEMPERATURE: 98 F | RESPIRATION RATE: 16 BRPM | DIASTOLIC BLOOD PRESSURE: 70 MMHG | WEIGHT: 104 LBS | BODY MASS INDEX: 18.43 KG/M2 | OXYGEN SATURATION: 99 %

## 2023-08-25 DIAGNOSIS — G62.9 POLYNEUROPATHY, UNSPECIFIED: ICD-10-CM

## 2023-08-25 PROCEDURE — 99215 OFFICE O/P EST HI 40 MIN: CPT

## 2023-08-25 NOTE — ASSESSMENT
[FreeTextEntry1] : -Metastatic colon adenocarcinoma status post left sigmoid colectomy unilateral oophorectomy salpingectomy currently on FOLFOX/Avastin. Staging CT rpt pending, transferring care to Dr Shyla CISNEROS effective 9/1/23. Patient is generally pleased with her care, but understandably is dealing w advanced malignancy, and is desirous of obtaining dedicated care from a dedicated cancer treatment institution.  - Vitamin D deficiency.  Patient started over-the-counter 2000 IU vitamin D daily.  Update level in a couple months.  - Difficult menstruation. - Iron deficiency anemia Message sent to oncology and gynecology regarding severe fatigue brain fog resulting in patient missing medications doses for 5 to 6 days during menstruation, to see if menstruation suppression would be of benefit, with plans underway evidently to start progesterone only menstruation suppression.  Probably not steady-state,  Hemoglobin MCV is normalized with Venofer though likely not steady state given widened RDW, despite normal iron stores currently, last Venofer 1st wk August.  Normal hemoglobin and MCV and RDW in 2016, therefore not a heritable hemoglobinopathy.  Hematology oncology following closely.  - Chemotherapy-associated neuropathy.  Week on week off FOLFOX Avastin .  Recommended that she start 300 mg gabapentin in the morning 200 mg in the afternoon 200 mg in the evening 2 days before chemotherapy starts, and to continue it until symptoms have completely resolved, then stepping down to 100 mg 3 times daily for 48 to 72 hours.  Unfortunately even the 300 too sedating,  going 200 tid, missing weekends w/o w/dl sxs.  Symptoms worse in the morning.  Recommended use of gloves overnight.  - Immunocompromised host.  Received Tdap recently, Prevnar 20 at last visit will pursue Shingrix after completes chemotherapy cycle.  *Routine adult health maintenance.  Tdap 2023, Prevnar 20 2023, Shingrix in the fall given immunocompromise status, COVID booster and influenza this fall recommended.  Paps and mammograms through her gynecologist Dr. Donaldson. Neg pap 4/23, denies h/o HPV  TSH lipid panel vitamin D 2023.  Hepatitis A and C naive, I do not see hep B surface antibody IgG (core IgM and surface antigen negative); we will document immunity with next draw, also HIV.   Plan: Hep B surface antibody HIV vitamin D 3 months. Disposition follow-up after labs in 3 months It was a pleasure to visit with Ms. Lyles today.  Answered questions best I could.  Disposition [id number]  Time 40 minutes

## 2023-08-25 NOTE — HISTORY OF PRESENT ILLNESS
[FreeTextEntry1] : Report visit.  Exam [de-identified] : Most pleasant 42-year-old white female with unfortunate recent diagnosis of stage T3N1A sigmoid adenocarcinoma incidentally found during follow-up CT following treatment for left tubo-ovarian abscess, with radiographic then (liver) biopsy-proven metastases to liver and lung April 2023 started on FOLFOX Avastin chemotherapy June 7, 2023 under the care of Dr. Massey, who comes in for exam and report visit.  Past few months have been hard understandably.  Struggling with anxiety receiving Ativan and weekly counseling and of course the support of her family.  Lorazepam 0.5 mg prescribed but not needing currently.  Never took in fact.  Also struggling with chemo related nausea, stomatitis, neuropathy on uptitrating gabapentin, currently comparatively low-dose 200 mg 3 times daily (normal creatinine).  Recent thyroid lipid panel (mild hypertriglyceridemia low HDL) and vitamin D deficiency 25 started on replacement therapy.  We communicated with her gynecologist and oncologist regarding menstrual brain fog leading to missed medications with plans underway to start her on progesterone only menstruation suppression.

## 2023-08-25 NOTE — PHYSICAL EXAM
[Declined Breast Exam] : declined breast exam  [Declined Rectal Exam] : declined rectal exam [Normal] : affect was normal and insight and judgment were intact [de-identified] : Left greater than right tonsillar hypertrophy, chronic. [de-identified] : Small nevus lower lip midline

## 2023-08-27 ENCOUNTER — NON-APPOINTMENT (OUTPATIENT)
Age: 43
End: 2023-08-27

## 2023-08-30 ENCOUNTER — APPOINTMENT (OUTPATIENT)
Dept: INFUSION THERAPY | Facility: HOSPITAL | Age: 43
End: 2023-08-30

## 2023-08-30 ENCOUNTER — APPOINTMENT (OUTPATIENT)
Dept: HEMATOLOGY ONCOLOGY | Facility: CLINIC | Age: 43
End: 2023-08-30

## 2023-09-01 ENCOUNTER — APPOINTMENT (OUTPATIENT)
Dept: INFUSION THERAPY | Facility: HOSPITAL | Age: 43
End: 2023-09-01

## 2023-09-13 ENCOUNTER — APPOINTMENT (OUTPATIENT)
Dept: HEMATOLOGY ONCOLOGY | Facility: CLINIC | Age: 43
End: 2023-09-13

## 2023-09-13 ENCOUNTER — APPOINTMENT (OUTPATIENT)
Dept: INFUSION THERAPY | Facility: HOSPITAL | Age: 43
End: 2023-09-13

## 2023-09-15 ENCOUNTER — APPOINTMENT (OUTPATIENT)
Dept: INFUSION THERAPY | Facility: HOSPITAL | Age: 43
End: 2023-09-15

## 2023-09-20 ENCOUNTER — LABORATORY RESULT (OUTPATIENT)
Age: 43
End: 2023-09-20

## 2023-09-26 ENCOUNTER — APPOINTMENT (OUTPATIENT)
Dept: SURGERY | Facility: HOSPITAL | Age: 43
End: 2023-09-26

## 2023-09-27 ENCOUNTER — APPOINTMENT (OUTPATIENT)
Dept: SURGERY | Facility: CLINIC | Age: 43
End: 2023-09-27
Payer: COMMERCIAL

## 2023-09-27 ENCOUNTER — APPOINTMENT (OUTPATIENT)
Dept: HEMATOLOGY ONCOLOGY | Facility: CLINIC | Age: 43
End: 2023-09-27

## 2023-09-27 ENCOUNTER — APPOINTMENT (OUTPATIENT)
Dept: INFUSION THERAPY | Facility: HOSPITAL | Age: 43
End: 2023-09-27

## 2023-09-27 VITALS — TEMPERATURE: 97.9 F | WEIGHT: 104 LBS | BODY MASS INDEX: 18.43 KG/M2 | HEIGHT: 63 IN

## 2023-09-27 DIAGNOSIS — R91.8 OTHER NONSPECIFIC ABNORMAL FINDING OF LUNG FIELD: ICD-10-CM

## 2023-09-27 DIAGNOSIS — Z91.89 OTHER SPECIFIED PERSONAL RISK FACTORS, NOT ELSEWHERE CLASSIFIED: ICD-10-CM

## 2023-09-27 DIAGNOSIS — K64.5 PERIANAL VENOUS THROMBOSIS: ICD-10-CM

## 2023-09-27 DIAGNOSIS — Z80.0 FAMILY HISTORY OF MALIGNANT NEOPLASM OF DIGESTIVE ORGANS: ICD-10-CM

## 2023-09-27 DIAGNOSIS — D50.9 IRON DEFICIENCY ANEMIA, UNSPECIFIED: ICD-10-CM

## 2023-09-27 DIAGNOSIS — Z82.49 FAMILY HISTORY OF ISCHEMIC HEART DISEASE AND OTHER DISEASES OF THE CIRCULATORY SYSTEM: ICD-10-CM

## 2023-09-27 PROCEDURE — 99214 OFFICE O/P EST MOD 30 MIN: CPT

## 2023-09-29 ENCOUNTER — APPOINTMENT (OUTPATIENT)
Dept: INFUSION THERAPY | Facility: HOSPITAL | Age: 43
End: 2023-09-29

## 2023-10-05 NOTE — DIETITIAN INITIAL EVALUATION ADULT - ENTER FROM (G/KG)
Patient has been assessed by Behavioral health intake and her disposition is to start Partial Hospital tomorrow at [place].    
1.2

## 2023-10-11 ENCOUNTER — APPOINTMENT (OUTPATIENT)
Dept: HEMATOLOGY ONCOLOGY | Facility: CLINIC | Age: 43
End: 2023-10-11

## 2023-10-11 ENCOUNTER — APPOINTMENT (OUTPATIENT)
Dept: INFUSION THERAPY | Facility: HOSPITAL | Age: 43
End: 2023-10-11

## 2023-10-13 ENCOUNTER — APPOINTMENT (OUTPATIENT)
Dept: INFUSION THERAPY | Facility: HOSPITAL | Age: 43
End: 2023-10-13

## 2023-11-01 ENCOUNTER — APPOINTMENT (OUTPATIENT)
Dept: INTERNAL MEDICINE | Facility: CLINIC | Age: 43
End: 2023-11-01
Payer: COMMERCIAL

## 2023-11-01 VITALS
TEMPERATURE: 97.3 F | RESPIRATION RATE: 16 BRPM | DIASTOLIC BLOOD PRESSURE: 70 MMHG | WEIGHT: 104 LBS | HEART RATE: 83 BPM | SYSTOLIC BLOOD PRESSURE: 112 MMHG | BODY MASS INDEX: 18.43 KG/M2 | OXYGEN SATURATION: 98 % | HEIGHT: 63 IN

## 2023-11-01 DIAGNOSIS — C18.9 MALIGNANT NEOPLASM OF COLON, UNSPECIFIED: ICD-10-CM

## 2023-11-01 DIAGNOSIS — E55.9 VITAMIN D DEFICIENCY, UNSPECIFIED: ICD-10-CM

## 2023-11-01 PROCEDURE — 99211 OFF/OP EST MAY X REQ PHY/QHP: CPT

## 2023-12-11 ENCOUNTER — NON-APPOINTMENT (OUTPATIENT)
Age: 43
End: 2023-12-11

## 2023-12-19 ENCOUNTER — APPOINTMENT (OUTPATIENT)
Dept: MAMMOGRAPHY | Facility: HOSPITAL | Age: 43
End: 2023-12-19
Payer: COMMERCIAL

## 2023-12-19 ENCOUNTER — OUTPATIENT (OUTPATIENT)
Dept: OUTPATIENT SERVICES | Facility: HOSPITAL | Age: 43
LOS: 1 days | End: 2023-12-19
Payer: COMMERCIAL

## 2023-12-19 ENCOUNTER — RESULT REVIEW (OUTPATIENT)
Age: 43
End: 2023-12-19

## 2023-12-19 DIAGNOSIS — Z90.49 ACQUIRED ABSENCE OF OTHER SPECIFIED PARTS OF DIGESTIVE TRACT: Chronic | ICD-10-CM

## 2023-12-19 DIAGNOSIS — Z00.00 ENCOUNTER FOR GENERAL ADULT MEDICAL EXAMINATION WITHOUT ABNORMAL FINDINGS: ICD-10-CM

## 2023-12-19 DIAGNOSIS — N70.93 SALPINGITIS AND OOPHORITIS, UNSPECIFIED: Chronic | ICD-10-CM

## 2023-12-19 DIAGNOSIS — Z98.890 OTHER SPECIFIED POSTPROCEDURAL STATES: Chronic | ICD-10-CM

## 2023-12-19 PROCEDURE — 77067 SCR MAMMO BI INCL CAD: CPT

## 2023-12-19 PROCEDURE — 77063 BREAST TOMOSYNTHESIS BI: CPT | Mod: 26

## 2023-12-19 PROCEDURE — 77067 SCR MAMMO BI INCL CAD: CPT | Mod: 26

## 2023-12-19 PROCEDURE — 77063 BREAST TOMOSYNTHESIS BI: CPT

## 2023-12-22 ENCOUNTER — APPOINTMENT (OUTPATIENT)
Dept: INTERNAL MEDICINE | Facility: CLINIC | Age: 43
End: 2023-12-22
Payer: COMMERCIAL

## 2023-12-22 PROCEDURE — G0009: CPT

## 2023-12-22 PROCEDURE — 90677 PCV20 VACCINE IM: CPT

## 2024-02-20 LAB
25(OH)D3 SERPL-MCNC: 36.3 NG/ML
HBV SURFACE AB SER QL: REACTIVE
HIV1+2 AB SPEC QL IA.RAPID: NONREACTIVE

## 2024-02-29 ENCOUNTER — APPOINTMENT (OUTPATIENT)
Dept: OBGYN | Facility: CLINIC | Age: 44
End: 2024-02-29
Payer: COMMERCIAL

## 2024-02-29 ENCOUNTER — NON-APPOINTMENT (OUTPATIENT)
Age: 44
End: 2024-02-29

## 2024-02-29 VITALS
DIASTOLIC BLOOD PRESSURE: 68 MMHG | SYSTOLIC BLOOD PRESSURE: 114 MMHG | HEART RATE: 81 BPM | BODY MASS INDEX: 20.91 KG/M2 | OXYGEN SATURATION: 98 % | HEIGHT: 63 IN | WEIGHT: 118 LBS | TEMPERATURE: 98 F

## 2024-02-29 DIAGNOSIS — N76.2 ACUTE VULVITIS: ICD-10-CM

## 2024-02-29 PROCEDURE — 99213 OFFICE O/P EST LOW 20 MIN: CPT

## 2024-02-29 NOTE — HISTORY OF PRESENT ILLNESS
[FreeTextEntry1] : Labial sores for past 2 weeks Currently gets 5 FU chemo every 2 weeks since July Denies vaginal bleeding or discharge

## 2024-02-29 NOTE — PHYSICAL EXAM
[Chaperone Present] : A chaperone was present in the examining room during all aspects of the physical examination [Vulvitis] : vulvitis [Labia Minora] : normal [Labia Majora] : normal [Atrophy] : atrophy [No Bleeding] : There was no active vaginal bleeding [Normal] : normal [Absent] : absent [Uterine Adnexae] : absent

## 2024-03-04 LAB
BACTERIA GENITAL AEROBE CULT: NORMAL
HHV SPEC CULT: NORMAL
HSV TYPE 1: NORMAL
HSV TYPE 2: NORMAL

## 2024-03-07 LAB
A VAGINAE DNA VAG QL NAA+PROBE: ABNORMAL
BVAB2 DNA VAG QL NAA+PROBE: NORMAL
C KRUSEI DNA VAG QL NAA+PROBE: NEGATIVE
C KRUSEI DNA VAG QL NAA+PROBE: NEGATIVE
M GENITALIUM DNA SPEC QL NAA+PROBE: NEGATIVE
M HOMINIS DNA SPEC QL NAA+PROBE: NEGATIVE
MEGA1 DNA VAG QL NAA+PROBE: NORMAL
T VAGINALIS RRNA SPEC QL NAA+PROBE: NEGATIVE
UREAPLASMA DNA SPEC QL NAA+PROBE: NEGATIVE

## 2024-03-14 ENCOUNTER — APPOINTMENT (OUTPATIENT)
Dept: OBGYN | Facility: CLINIC | Age: 44
End: 2024-03-14

## 2024-03-18 ENCOUNTER — APPOINTMENT (OUTPATIENT)
Dept: INTERNAL MEDICINE | Facility: CLINIC | Age: 44
End: 2024-03-18
Payer: COMMERCIAL

## 2024-03-18 VITALS
HEART RATE: 78 BPM | WEIGHT: 117 LBS | TEMPERATURE: 98.2 F | SYSTOLIC BLOOD PRESSURE: 118 MMHG | HEIGHT: 63 IN | BODY MASS INDEX: 20.73 KG/M2 | OXYGEN SATURATION: 99 % | DIASTOLIC BLOOD PRESSURE: 74 MMHG | RESPIRATION RATE: 16 BRPM

## 2024-03-18 DIAGNOSIS — R07.89 OTHER CHEST PAIN: ICD-10-CM

## 2024-03-18 PROCEDURE — 99213 OFFICE O/P EST LOW 20 MIN: CPT

## 2024-03-18 NOTE — HISTORY OF PRESENT ILLNESS
[FreeTextEntry1] : chest pain with sneezing [de-identified] : Most pleasant 43-year-old white female with unfortunate diagnosis of stage T3N1A sigmoid adenocarcinoma incidentally found during follow-up CT following treatment for left tubo-ovarian abscess, with liver biopsy-proven metastases to liver and lung April 2023 started on FOLFOX Avastin chemotherapy June 7, 2023 initially under the care of Dr. Massey, then tranferred care to [facility] 8/2023, who comes in for chest pain.  Understandly initially struggled to cope with this diagnosis, plus chemo related nausea, stomatitis, neuropathy on gabapentin, currently comparatively low-dose 200 mg 3 times daily (normal creatinine).  Outside CT September 2023 which showed complete regression of the liver metastases and 4 stable 5 mm lung nodules, which invited decreased FOLFOX dosing frequency from q2 to q3wks, with interval increase in some lung nodules, decrease in others. q2wk dosing resumed in Dec 2023, with stable nodules on late Feb 2023 imaging.  About a week ago, as her spring allergies recurred, she noticed for the first time mid sternal pain with her severe sneeze. She is trying to control her allergies w OTC anti HA. Next Chest CT 5/2024.

## 2024-03-18 NOTE — ASSESSMENT
[FreeTextEntry1] : -Sternal pain. Reassuring exam and history. Allegra or equivalent, splinting w sneeze, image if progresses to exclude sternal fracture or bone met.   - Vitamin D deficiency. Patient started over-the-counter 2000 IU vitamin D daily to good effect.  -Amenorrhea. - Iron deficiency anemia, resolved  - Chemotherapy-associated neuropathy.  - Immunocompromised host.2023 flu and Tdap. due for Prevnar 20. Will pursue Shingrix after completes chemotherapy cycle.  *Routine adult health maintenance. Tdap 2023, Prevnar 20 indicated, Shingrix in future. UTD flu covid.  Paps and mammograms through her gynecologist Dr. Donaldson. Neg pap 4/23, denies h/o HPV  TSH lipid panel unremarkable 2023.Recent elevation in TSH in 6 range, normal fT4 and no worrisome masses on [place] [facility] [place].  HIV Hepatitis A and C naive, Hep B immune 2023.   Disposition follow-up 8/2024 It was a pleasure to visit with Ms. Lyles today. Answered questions best I could. Time 25min

## 2024-07-17 NOTE — ASU PATIENT PROFILE, ADULT - IS PATIENT PREGNANT?
Prep Survey      Flowsheet Row Responses   [facility] facility patient discharged from? Non-[facility]   Is LACE score < 7 ? Non-BH Discharge   Eligibility Not Eligible   What are the reasons patient is not eligible? [facility]   Does the patient have one of the following disease processes/diagnoses(primary or secondary)? Other   Prep survey completed? Yes            GWENDOLYN SUBRAMANIAN - Registered Nurse          
no

## 2024-07-25 ENCOUNTER — APPOINTMENT (OUTPATIENT)
Dept: PULMONOLOGY | Facility: CLINIC | Age: 44
End: 2024-07-25
Payer: COMMERCIAL

## 2024-07-25 PROCEDURE — 94729 DIFFUSING CAPACITY: CPT

## 2024-07-25 PROCEDURE — 94010 BREATHING CAPACITY TEST: CPT

## 2024-07-25 PROCEDURE — 94727 GAS DIL/WSHOT DETER LNG VOL: CPT

## 2024-08-02 ENCOUNTER — TRANSCRIPTION ENCOUNTER (OUTPATIENT)
Age: 44
End: 2024-08-02

## 2024-08-05 ENCOUNTER — APPOINTMENT (OUTPATIENT)
Dept: INTERNAL MEDICINE | Facility: CLINIC | Age: 44
End: 2024-08-05

## 2024-08-05 PROCEDURE — G0009: CPT

## 2024-08-05 PROCEDURE — 99396 PREV VISIT EST AGE 40-64: CPT | Mod: 25

## 2024-08-05 PROCEDURE — 90677 PCV20 VACCINE IM: CPT

## 2024-08-05 NOTE — ASSESSMENT
[FreeTextEntry1] : *Vitamin D deficiency.  For vitamin D in the 20s, since last year has been on 2000 IU vitamin D daily to good effect.  Update level  *Amenorrhea. * Iron deficiency anemia history.  Most recent labs showed normal iron levels in [facility].  *Chemotherapy-associated neuropathy.  *Immunocompromised host.2023 flu and Tdap. Today received Prevnar 20. Shingrix at her pharmacy.  *Routine adult health maintenance. Tdap 2023, Prevnar 20 today, Shingrix in future. UTD flu covid.  Breast, Cervical cancer screening. Mm 12/2023. Paps and mammograms through her gynecologist Dr. Donaldson. Neg pap 4/23, denies h/o HPV  TSH lipid panel unremarkable 2023.Recent elevation in TSH in 6 range, normal fT4 and no worrisome masses on thyroid [place] [facility] [place], [place].  HIV Hepatitis A and C naive, Hep B immune 2023. Update TSH A1c vit D lipid UA. Has PAT for VATS. Disposition follow-up 8/2025 It was a pleasure to visit with Ms. Trupti today. Answered questions best I could. Time 45min

## 2024-08-05 NOTE — HISTORY OF PRESENT ILLNESS
[FreeTextEntry1] : CPE [de-identified] : Most pleasant 43-year-old white female with unfortunate diagnosis of stage T3N1A sigmoid adenocarcinoma incidentally found during follow-up CT following treatment for left tubo-ovarian abscess, with liver biopsy-proven metastases to liver and lung April 2023 started on FOLFOX Avastin chemotherapy June 7, 2023 initially under the care of Dr. Massey, then tranferred care to  Dr Mansfield/[facility] 8/2023, who comes in for CPE.  SHe has no acute concerns.  For interval change in her R lung nodules, will be undergoing VATS next week with diagnostic and curative intent.   She denies intercurrent illness.  Understandly initially struggled to cope with this diagnosis, plus chemo related nausea, stomatitis, neuropathy on gabapentin, but no longer requiring gabapentin.  She is taking 2000IU supplement for mild vitamin D deficiency..  Oncologist surveillance per patient consisted most recently of CT C/A/P in June 2024, prompting upcoming lung VATS. Continues on FOLFOX/avastin.

## 2024-08-05 NOTE — PHYSICAL EXAM
[No Acute Distress] : no acute distress [Well Nourished] : well nourished [Well Developed] : well developed [Well-Appearing] : well-appearing [Normal Sclera/Conjunctiva] : normal sclera/conjunctiva [PERRL] : pupils equal round and reactive to light [EOMI] : extraocular movements intact [Normal Outer Ear/Nose] : the outer ears and nose were normal in appearance [Normal Oropharynx] : the oropharynx was normal [No JVD] : no jugular venous distention [No Lymphadenopathy] : no lymphadenopathy [Supple] : supple [Thyroid Normal, No Nodules] : the thyroid was normal and there were no nodules present [No Respiratory Distress] : no respiratory distress  [No Accessory Muscle Use] : no accessory muscle use [Clear to Auscultation] : lungs were clear to auscultation bilaterally [Normal Rate] : normal rate  [Regular Rhythm] : with a regular rhythm [Normal S1, S2] : normal S1 and S2 [No Murmur] : no murmur heard [No Carotid Bruits] : no carotid bruits [No Abdominal Bruit] : a ~M bruit was not heard ~T in the abdomen [No Varicosities] : no varicosities [Pedal Pulses Present] : the pedal pulses are present [No Edema] : there was no peripheral edema [No Palpable Aorta] : no palpable aorta [No Extremity Clubbing/Cyanosis] : no extremity clubbing/cyanosis [Soft] : abdomen soft [Non Tender] : non-tender [Non-distended] : non-distended [No Masses] : no abdominal mass palpated [No HSM] : no HSM [Normal Bowel Sounds] : normal bowel sounds [Normal Posterior Cervical Nodes] : no posterior cervical lymphadenopathy [Normal Anterior Cervical Nodes] : no anterior cervical lymphadenopathy [No CVA Tenderness] : no CVA  tenderness [No Spinal Tenderness] : no spinal tenderness [No Joint Swelling] : no joint swelling [Grossly Normal Strength/Tone] : grossly normal strength/tone [No Rash] : no rash [Coordination Grossly Intact] : coordination grossly intact [No Focal Deficits] : no focal deficits [Normal Gait] : normal gait [Deep Tendon Reflexes (DTR)] : deep tendon reflexes were 2+ and symmetric [Normal Affect] : the affect was normal [Normal Insight/Judgement] : insight and judgment were intact [de-identified] : [place] [de-identified] : mild finger cracking, redness, flexural surfaces [de-identified] : decreased sensation toe tips

## 2024-08-05 NOTE — HEALTH RISK ASSESSMENT
[Very Good] : ~his/her~  mood as very good [No] : In the past 12 months have you used drugs other than those required for medical reasons? No [No falls in past year] : Patient reported no falls in the past year [0] : 2) Feeling down, depressed, or hopeless: Not at all (0) [PHQ-2 Negative - No further assessment needed] : PHQ-2 Negative - No further assessment needed [Audit-CScore] : 0 [de-identified] : walking [de-identified] : healthy [[facility] [place]] : 9 [[id number]] : 0 [Never] : Never [Patient reported mammogram was normal] : Patient reported mammogram was normal [Patient reported PAP Smear was normal] : Patient reported PAP Smear was normal [Patient reported colonoscopy was abnormal] : Patient reported colonoscopy was abnormal [HIV test declined] : HIV test declined [Hepatitis C test declined] : Hepatitis C test declined [Change in mental status noted] : No change in mental status noted [Language] : denies difficulty with language [Behavior] : denies difficulty with behavior [Learning/Retaining New Information] : denies difficulty learning/retaining new information [Handling Complex Tasks] : denies difficulty handling complex tasks [Reasoning] : denies difficulty with reasoning [Spatial Ability and Orientation] : denies difficulty with spatial ability and orientation [None] : None [With Family] : lives with family [Employed] : employed [College] : College [] :  [Sexually Active] : sexually active [High Risk Behavior] : no high risk behavior [Fully functional (bathing, dressing, toileting, transferring, walking, feeding)] : Fully functional (bathing, dressing, toileting, transferring, walking, feeding) [Fully functional (using the telephone, shopping, preparing meals, housekeeping, doing laundry, using] : Fully functional and needs no help or supervision to perform IADLs (using the telephone, shopping, preparing meals, housekeeping, doing laundry, using transportation, managing medications and managing finances) [Reports changes in hearing] : Reports no changes in hearing [Reports changes in vision] : Reports no changes in vision [Reports normal functional visual acuity (ie: able to read med bottle)] : Reports normal functional visual acuity [Reports changes in dental health] : Reports no changes in dental health [Smoke Detector] : smoke detector [Carbon Monoxide Detector] : carbon monoxide detector [Safety elements used in home] : safety elements used in home [Seat Belt] :  uses seat belt [Sunscreen] : uses sunscreen [Travel to Developing Areas] : does not  travel to developing areas [TB Exposure] : is not being exposed to tuberculosis [Caregiver Concerns] : does not have caregiver concerns [MammogramDate] : 12/23 [PapSmearDate] : 04/23 [ColonoscopyDate] : 04/23 [HIVDate] : 11/23 [HepatitisCDate] : 05/23

## 2024-10-01 ENCOUNTER — APPOINTMENT (OUTPATIENT)
Dept: DERMATOLOGY | Facility: CLINIC | Age: 44
End: 2024-10-01
Payer: COMMERCIAL

## 2024-10-01 DIAGNOSIS — L81.4 OTHER MELANIN HYPERPIGMENTATION: ICD-10-CM

## 2024-10-01 DIAGNOSIS — L70.0 ACNE VULGARIS: ICD-10-CM

## 2024-10-01 DIAGNOSIS — L57.8 OTHER SKIN CHANGES DUE TO CHRONIC EXPOSURE TO NONIONIZING RADIATION: ICD-10-CM

## 2024-10-01 DIAGNOSIS — L71.9 ROSACEA, UNSPECIFIED: ICD-10-CM

## 2024-10-01 PROCEDURE — 99203 OFFICE O/P NEW LOW 30 MIN: CPT

## 2024-10-01 RX ORDER — METRONIDAZOLE 7.5 MG/G
0.75 GEL TOPICAL
Qty: 1 | Refills: 3 | Status: ACTIVE | COMMUNITY
Start: 2024-10-01 | End: 1900-01-01

## 2024-10-01 NOTE — ASSESSMENT
[FreeTextEntry1] : Alert, oriented, well, pleasant.  Solar elastosis. Solar lentigo. Chronic sun damage changes face. No personal or family history of cutaneous malignancy. Sun protection.  Comedones, tiny, numerous, cheeks, chin, neck, chest. Comedonal acne. Discontinue natural oils.   start Differin Gel nightly. Side effects discussed with patient.   non-comedogenic products only.  Erythema cheeks. Admits to flushing at times. Brother has rosacea. No previous treatment.   Rosacea   start metronidazole gel twice per day.  f/u 2 months.

## 2024-10-01 NOTE — HISTORY OF PRESENT ILLNESS
[FreeTextEntry1] : Plugged pores face, neck, chest. "Acne." [de-identified] : Using natural oils face and chest daily.

## 2024-11-27 ENCOUNTER — APPOINTMENT (OUTPATIENT)
Dept: SURGERY | Facility: CLINIC | Age: 44
End: 2024-11-27
Payer: COMMERCIAL

## 2024-11-27 DIAGNOSIS — Z80.0 FAMILY HISTORY OF MALIGNANT NEOPLASM OF DIGESTIVE ORGANS: ICD-10-CM

## 2024-11-27 DIAGNOSIS — C18.9 MALIGNANT NEOPLASM OF COLON, UNSPECIFIED: ICD-10-CM

## 2024-11-27 DIAGNOSIS — Z82.49 FAMILY HISTORY OF ISCHEMIC HEART DISEASE AND OTHER DISEASES OF THE CIRCULATORY SYSTEM: ICD-10-CM

## 2024-11-27 DIAGNOSIS — K64.5 PERIANAL VENOUS THROMBOSIS: ICD-10-CM

## 2024-11-27 DIAGNOSIS — D50.9 IRON DEFICIENCY ANEMIA, UNSPECIFIED: ICD-10-CM

## 2024-11-27 PROCEDURE — 99214 OFFICE O/P EST MOD 30 MIN: CPT

## 2024-11-27 RX ORDER — SODIUM PICOSULFATE, MAGNESIUM OXIDE, AND ANHYDROUS CITRIC ACID 12; 3.5; 1 G/175ML; G/175ML; MG/175ML
10-3.5-12 MG-GM LIQUID ORAL
Qty: 1 | Refills: 0 | Status: ACTIVE | COMMUNITY
Start: 2024-11-27 | End: 1900-01-01

## 2024-12-17 ENCOUNTER — OUTPATIENT (OUTPATIENT)
Dept: OUTPATIENT SERVICES | Facility: HOSPITAL | Age: 44
LOS: 1 days | End: 2024-12-17
Payer: COMMERCIAL

## 2024-12-17 ENCOUNTER — APPOINTMENT (OUTPATIENT)
Dept: SURGERY | Facility: HOSPITAL | Age: 44
End: 2024-12-17

## 2024-12-17 ENCOUNTER — TRANSCRIPTION ENCOUNTER (OUTPATIENT)
Age: 44
End: 2024-12-17

## 2024-12-17 ENCOUNTER — APPOINTMENT (OUTPATIENT)
Dept: DERMATOLOGY | Facility: CLINIC | Age: 44
End: 2024-12-17

## 2024-12-17 DIAGNOSIS — N70.93 SALPINGITIS AND OOPHORITIS, UNSPECIFIED: Chronic | ICD-10-CM

## 2024-12-17 DIAGNOSIS — Z90.49 ACQUIRED ABSENCE OF OTHER SPECIFIED PARTS OF DIGESTIVE TRACT: Chronic | ICD-10-CM

## 2024-12-17 DIAGNOSIS — Z12.11 ENCOUNTER FOR SCREENING FOR MALIGNANT NEOPLASM OF COLON: ICD-10-CM

## 2024-12-17 DIAGNOSIS — Z98.890 OTHER SPECIFIED POSTPROCEDURAL STATES: Chronic | ICD-10-CM

## 2024-12-17 DIAGNOSIS — Z85.038 PERSONAL HISTORY OF OTHER MALIGNANT NEOPLASM OF LARGE INTESTINE: ICD-10-CM

## 2024-12-17 PROCEDURE — 45378 DIAGNOSTIC COLONOSCOPY: CPT

## 2024-12-17 RX ORDER — SODIUM CHLORIDE 9 MG/ML
500 INJECTION, SOLUTION INTRAMUSCULAR; INTRAVENOUS; SUBCUTANEOUS
Refills: 0 | Status: COMPLETED | OUTPATIENT
Start: 2024-12-17 | End: 2024-12-17

## 2024-12-17 RX ADMIN — SODIUM CHLORIDE 75 MILLILITER(S): 9 INJECTION, SOLUTION INTRAMUSCULAR; INTRAVENOUS; SUBCUTANEOUS at 11:23

## 2025-02-04 ENCOUNTER — NON-APPOINTMENT (OUTPATIENT)
Age: 45
End: 2025-02-04

## 2025-02-05 ENCOUNTER — APPOINTMENT (OUTPATIENT)
Dept: INTERNAL MEDICINE | Facility: CLINIC | Age: 45
End: 2025-02-05
Payer: COMMERCIAL

## 2025-02-05 VITALS
SYSTOLIC BLOOD PRESSURE: 98 MMHG | TEMPERATURE: 97.6 F | DIASTOLIC BLOOD PRESSURE: 64 MMHG | WEIGHT: 114 LBS | HEART RATE: 86 BPM | BODY MASS INDEX: 20.2 KG/M2 | HEIGHT: 63 IN | OXYGEN SATURATION: 98 % | RESPIRATION RATE: 16 BRPM

## 2025-02-05 DIAGNOSIS — K21.9 GASTRO-ESOPHAGEAL REFLUX DISEASE W/OUT ESOPHAGITIS: ICD-10-CM

## 2025-02-05 DIAGNOSIS — J11.1 INFLUENZA DUE TO UNIDENTIFIED INFLUENZA VIRUS WITH OTHER RESPIRATORY MANIFESTATIONS: ICD-10-CM

## 2025-02-05 DIAGNOSIS — R31.9 HEMATURIA, UNSPECIFIED: ICD-10-CM

## 2025-02-05 DIAGNOSIS — R11.0 NAUSEA: ICD-10-CM

## 2025-02-05 PROCEDURE — 99214 OFFICE O/P EST MOD 30 MIN: CPT

## 2025-02-05 RX ORDER — FAMOTIDINE 20 MG/1
20 TABLET, FILM COATED ORAL
Qty: 20 | Refills: 1 | Status: ACTIVE | COMMUNITY
Start: 2025-02-05 | End: 1900-01-01

## 2025-02-05 RX ORDER — METOCLOPRAMIDE 10 MG/1
10 TABLET ORAL
Qty: 30 | Refills: 0 | Status: ACTIVE | COMMUNITY
Start: 2025-02-05 | End: 1900-01-01

## 2025-02-07 ENCOUNTER — NON-APPOINTMENT (OUTPATIENT)
Age: 45
End: 2025-02-07

## 2025-02-10 ENCOUNTER — NON-APPOINTMENT (OUTPATIENT)
Age: 45
End: 2025-02-10

## 2025-02-12 ENCOUNTER — OUTPATIENT (OUTPATIENT)
Dept: OUTPATIENT SERVICES | Facility: HOSPITAL | Age: 45
LOS: 1 days | Discharge: ANOTHER HOSPITAL | End: 2025-02-12
Payer: COMMERCIAL

## 2025-02-12 DIAGNOSIS — Z90.49 ACQUIRED ABSENCE OF OTHER SPECIFIED PARTS OF DIGESTIVE TRACT: Chronic | ICD-10-CM

## 2025-02-12 DIAGNOSIS — Z98.890 OTHER SPECIFIED POSTPROCEDURAL STATES: Chronic | ICD-10-CM

## 2025-02-12 DIAGNOSIS — N70.93 SALPINGITIS AND OOPHORITIS, UNSPECIFIED: Chronic | ICD-10-CM

## 2025-02-12 PROCEDURE — 90839 PSYTX CRISIS INITIAL 60 MIN: CPT | Mod: 95

## 2025-02-13 PROCEDURE — 99214 OFFICE O/P EST MOD 30 MIN: CPT

## 2025-02-13 PROCEDURE — 90833 PSYTX W PT W E/M 30 MIN: CPT

## 2025-02-14 DIAGNOSIS — F43.23 ADJUSTMENT DISORDER WITH MIXED ANXIETY AND DEPRESSED MOOD: ICD-10-CM

## 2025-02-28 NOTE — ASU PREOP CHECKLIST - IV STARTED
The insurance company required a TB test and patient completed and results have been faxed to insurance company so now still waiting.   yes

## 2025-03-02 ENCOUNTER — NON-APPOINTMENT (OUTPATIENT)
Age: 45
End: 2025-03-02

## 2025-03-12 ENCOUNTER — APPOINTMENT (OUTPATIENT)
Dept: INTERNAL MEDICINE | Facility: CLINIC | Age: 45
End: 2025-03-12
Payer: COMMERCIAL

## 2025-03-12 VITALS
SYSTOLIC BLOOD PRESSURE: 108 MMHG | BODY MASS INDEX: 19.84 KG/M2 | RESPIRATION RATE: 14 BRPM | OXYGEN SATURATION: 96 % | DIASTOLIC BLOOD PRESSURE: 70 MMHG | HEIGHT: 63 IN | WEIGHT: 112 LBS | TEMPERATURE: 97.1 F | HEART RATE: 86 BPM

## 2025-03-12 DIAGNOSIS — F41.9 ANXIETY DISORDER, UNSPECIFIED: ICD-10-CM

## 2025-03-12 DIAGNOSIS — F32.A ANXIETY DISORDER, UNSPECIFIED: ICD-10-CM

## 2025-03-12 PROCEDURE — 99213 OFFICE O/P EST LOW 20 MIN: CPT

## 2025-03-12 RX ORDER — SERTRALINE HYDROCHLORIDE 50 MG/1
50 TABLET, FILM COATED ORAL DAILY
Qty: 90 | Refills: 3 | Status: ACTIVE | COMMUNITY
Start: 1900-01-01 | End: 1900-01-01

## 2025-04-11 ENCOUNTER — APPOINTMENT (OUTPATIENT)
Dept: INTERNAL MEDICINE | Facility: CLINIC | Age: 45
End: 2025-04-11
Payer: COMMERCIAL

## 2025-04-11 VITALS
TEMPERATURE: 97.8 F | HEIGHT: 63 IN | WEIGHT: 110 LBS | BODY MASS INDEX: 19.49 KG/M2 | HEART RATE: 97 BPM | RESPIRATION RATE: 16 BRPM | OXYGEN SATURATION: 99 % | DIASTOLIC BLOOD PRESSURE: 76 MMHG | SYSTOLIC BLOOD PRESSURE: 124 MMHG

## 2025-04-11 DIAGNOSIS — F41.9 ANXIETY DISORDER, UNSPECIFIED: ICD-10-CM

## 2025-04-11 DIAGNOSIS — F32.A ANXIETY DISORDER, UNSPECIFIED: ICD-10-CM

## 2025-04-11 PROCEDURE — 99213 OFFICE O/P EST LOW 20 MIN: CPT

## 2025-04-23 NOTE — DISCHARGE NOTE PROVIDER - NSDCCAREPROVSEEN_GEN_ALL_CORE_FT
"Group Topic: Decision Making   Group Date: 4/23/2025  Start Time: 1000  End Time: 1100  Facilitators: SUSAN Oreilly   Department: [facility]    Number of Participants: 4   Group Focus: other Positive Steps for Well Being  Treatment Modality: Other: Recreation Therapy  Interventions utilized were exploration, group exercise, patient education, problem solving, and support  Purpose: coping skills, maladaptive thinking, feelings, irrational fears, communication skills, insight or knowledge, self-worth, self-care, relapse prevention strategies, and trigger / craving management    Name: Nevaeh Cuba YOB: 1955   MR: [id number]      Facilitator: Recreational Therapist  Level of Participation: moderate  Quality of Participation: distractible  Interactions with others: gave feedback  Mood/Affect: flat  Triggers (if applicable): n/a  Cognition: not focused  Progress: Minimal  Comments: pt problem is delusional thought.  Pt joins group with little encouragement.  Appears as though she is attentive, but comments made are not related to group topic and pt is repetitive with comments.  Pt displaying negative thinking, states she \"sits like a log cause I can't do anything else\".  Redirection not effective.   Plan: continue with services      " Mendoza, Benson Cook

## 2025-05-12 NOTE — ED ADULT TRIAGE NOTE - HEART RATE (BEATS/MIN)
----- Message from Dr. Jose Boyce MD sent at 5/12/2025  8:50 AM EDT -----  Repeat colonoscopy in 3 years    3 yr colon recall encountered  
93

## 2025-05-21 ENCOUNTER — NON-APPOINTMENT (OUTPATIENT)
Age: 45
End: 2025-05-21

## 2025-07-02 ENCOUNTER — APPOINTMENT (OUTPATIENT)
Dept: SURGERY | Facility: CLINIC | Age: 45
End: 2025-07-02
Payer: COMMERCIAL

## 2025-07-02 PROBLEM — K62.5 BLEEDING PER RECTUM: Status: ACTIVE | Noted: 2025-07-02

## 2025-07-02 PROCEDURE — 99214 OFFICE O/P EST MOD 30 MIN: CPT

## 2025-09-12 ENCOUNTER — NON-APPOINTMENT (OUTPATIENT)
Age: 45
End: 2025-09-12

## (undated) DEVICE — SOLIDIFIER CANN EXPRESS 3K

## (undated) DEVICE — KIT ENDO PROCEDURE CUST W/VLV

## (undated) DEVICE — SOL IRR POUR H2O 1000ML

## (undated) DEVICE — TUBING LEVEL ONE NORMOFLO SET

## (undated) DEVICE — VENODYNE/SCD SLEEVE CALF MEDIUM

## (undated) DEVICE — CANISTER SUCTION LID GUARD 3000CC

## (undated) DEVICE — POSITIONER STRAP ARMBOARD VELCRO TS-30

## (undated) DEVICE — SNARE POLYP SENS SM 13MM 240CM

## (undated) DEVICE — PLATE NESSY 170

## (undated) DEVICE — TUBING CAP SET ERBEFLO CLEVERCAP HYBRID CO2 FOR OLYMPUS SCOPES AND UCR

## (undated) DEVICE — PACK CYSTO

## (undated) DEVICE — WARMING BLANKET UPPER ADULT

## (undated) DEVICE — WARMING BLANKET FULL UNDERBODY

## (undated) DEVICE — DRAPE COVER TABLE 44X75"

## (undated) DEVICE — CATH NG SALEM SUMP 16FR

## (undated) DEVICE — SUT CHROMIC 0 60" REEL

## (undated) DEVICE — DRSG CURITY GAUZE SPONGE 4 X 4" 12-PLY

## (undated) DEVICE — FOLEY TRAY 16FR SURESTEP LTX BG

## (undated) DEVICE — SOL IRR POUR NS 0.9% 500ML

## (undated) DEVICE — MARKER ENDO SPOT EX

## (undated) DEVICE — GLV 6.5 PROTEXIS (WHITE)

## (undated) DEVICE — Device

## (undated) DEVICE — SYR LUER LOK 20CC

## (undated) DEVICE — POLY TRAP ETRAP

## (undated) DEVICE — POSITIONER FOAM HEAD CRADLE (PINK)

## (undated) DEVICE — SUT CHROMIC 2-0 30" V-20

## (undated) DEVICE — SUT CHROMIC 2-0 60" REEL

## (undated) DEVICE — SUT SOFSILK 2-0 30" V-20

## (undated) DEVICE — SNARE EXACTO COLD 9MMX230CM

## (undated) DEVICE — CONTAINER FORMALIN BUFF 10% 60ML

## (undated) DEVICE — PACK MINOR

## (undated) DEVICE — GLV 7.5 PROTEXIS (WHITE)

## (undated) DEVICE — DRAPE TOWEL BLUE 17" X 24"

## (undated) DEVICE — FOLEY HOLDER STATLOCK 2 WAY ADULT

## (undated) DEVICE — FORCEP RADIAL JAW 4 240CM DISP

## (undated) DEVICE — DRAPE CAMERA VIDEO 7"X96"

## (undated) DEVICE — SOL IRR BAG H2O 3000ML

## (undated) DEVICE — LIGASURE BLUNT TIP 37CM

## (undated) DEVICE — GLV 8 PROTEXIS (WHITE)

## (undated) DEVICE — URIMETER DRAINAGE SET CS/20

## (undated) DEVICE — ENDOCUFF VISION SZ 2 LG GRN

## (undated) DEVICE — SUT CHROMIC 3-0 30" V-20

## (undated) DEVICE — SOL IRR POUR H2O 1500ML

## (undated) DEVICE — ELCTR STRYKER NEPTUNE SMOKE EVACUATION PENCIL (GREEN)

## (undated) DEVICE — LAP PAD 18 X 18"

## (undated) DEVICE — TUBING HYDRO-SURG PLUS IRRIGATOR W SMOKEVAC & PROBE